# Patient Record
Sex: FEMALE | Race: BLACK OR AFRICAN AMERICAN | NOT HISPANIC OR LATINO | Employment: OTHER | URBAN - METROPOLITAN AREA
[De-identification: names, ages, dates, MRNs, and addresses within clinical notes are randomized per-mention and may not be internally consistent; named-entity substitution may affect disease eponyms.]

---

## 2020-07-20 ENCOUNTER — APPOINTMENT (OUTPATIENT)
Dept: RADIOLOGY | Facility: MEDICAL CENTER | Age: 40
DRG: 021 | End: 2020-07-20
Attending: EMERGENCY MEDICINE

## 2020-07-20 ENCOUNTER — APPOINTMENT (OUTPATIENT)
Dept: RADIOLOGY | Facility: MEDICAL CENTER | Age: 40
DRG: 021 | End: 2020-07-20
Attending: RADIOLOGY

## 2020-07-20 ENCOUNTER — ANESTHESIA EVENT (OUTPATIENT)
Dept: RADIOLOGY | Facility: MEDICAL CENTER | Age: 40
DRG: 021 | End: 2020-07-20

## 2020-07-20 ENCOUNTER — HOSPITAL ENCOUNTER (INPATIENT)
Facility: MEDICAL CENTER | Age: 40
LOS: 14 days | DRG: 021 | End: 2020-08-03
Attending: EMERGENCY MEDICINE | Admitting: INTERNAL MEDICINE

## 2020-07-20 ENCOUNTER — ANESTHESIA (OUTPATIENT)
Dept: RADIOLOGY | Facility: MEDICAL CENTER | Age: 40
DRG: 021 | End: 2020-07-20

## 2020-07-20 DIAGNOSIS — I60.9 SAH (SUBARACHNOID HEMORRHAGE) (HCC): ICD-10-CM

## 2020-07-20 DIAGNOSIS — I60.9 SUBARACHNOID HEMORRHAGE (HCC): ICD-10-CM

## 2020-07-20 PROBLEM — I10 HTN (HYPERTENSION): Status: ACTIVE | Noted: 2020-07-20

## 2020-07-20 PROBLEM — G89.29 PAIN, CHRONIC: Status: ACTIVE | Noted: 2020-07-20

## 2020-07-20 LAB
ALBUMIN SERPL BCP-MCNC: 4.1 G/DL (ref 3.2–4.9)
ALBUMIN/GLOB SERPL: 1.3 G/DL
ALP SERPL-CCNC: 98 U/L (ref 30–99)
ALT SERPL-CCNC: 9 U/L (ref 2–50)
ANION GAP SERPL CALC-SCNC: 13 MMOL/L (ref 7–16)
APTT PPP: 30.5 SEC (ref 24.7–36)
AST SERPL-CCNC: 7 U/L (ref 12–45)
BASOPHILS # BLD AUTO: 0.9 % (ref 0–1.8)
BASOPHILS # BLD: 0.04 K/UL (ref 0–0.12)
BILIRUB SERPL-MCNC: 0.3 MG/DL (ref 0.1–1.5)
BUN SERPL-MCNC: 13 MG/DL (ref 8–22)
CALCIUM SERPL-MCNC: 9.3 MG/DL (ref 8.5–10.5)
CHLORIDE SERPL-SCNC: 102 MMOL/L (ref 96–112)
CO2 SERPL-SCNC: 24 MMOL/L (ref 20–33)
CORTIS SERPL-MCNC: 19 UG/DL (ref 0–23)
CREAT SERPL-MCNC: 1.14 MG/DL (ref 0.5–1.4)
EOSINOPHIL # BLD AUTO: 0.15 K/UL (ref 0–0.51)
EOSINOPHIL NFR BLD: 3.3 % (ref 0–6.9)
ERYTHROCYTE [DISTWIDTH] IN BLOOD BY AUTOMATED COUNT: 48.6 FL (ref 35.9–50)
GLOBULIN SER CALC-MCNC: 3.1 G/DL (ref 1.9–3.5)
GLUCOSE SERPL-MCNC: 88 MG/DL (ref 65–99)
HCT VFR BLD AUTO: 42 % (ref 37–47)
HGB BLD-MCNC: 13.1 G/DL (ref 12–16)
IMM GRANULOCYTES # BLD AUTO: 0.01 K/UL (ref 0–0.11)
IMM GRANULOCYTES NFR BLD AUTO: 0.2 % (ref 0–0.9)
INR PPP: 1.01 (ref 0.87–1.13)
LYMPHOCYTES # BLD AUTO: 2.19 K/UL (ref 1–4.8)
LYMPHOCYTES NFR BLD: 48 % (ref 22–41)
MCH RBC QN AUTO: 27.6 PG (ref 27–33)
MCHC RBC AUTO-ENTMCNC: 31.2 G/DL (ref 33.6–35)
MCV RBC AUTO: 88.6 FL (ref 81.4–97.8)
MONOCYTES # BLD AUTO: 0.32 K/UL (ref 0–0.85)
MONOCYTES NFR BLD AUTO: 7 % (ref 0–13.4)
NEUTROPHILS # BLD AUTO: 1.85 K/UL (ref 2–7.15)
NEUTROPHILS NFR BLD: 40.6 % (ref 44–72)
NRBC # BLD AUTO: 0 K/UL
NRBC BLD-RTO: 0 /100 WBC
PLATELET # BLD AUTO: 164 K/UL (ref 164–446)
PMV BLD AUTO: 12.1 FL (ref 9–12.9)
POTASSIUM SERPL-SCNC: 3.5 MMOL/L (ref 3.6–5.5)
PROT SERPL-MCNC: 7.2 G/DL (ref 6–8.2)
PROTHROMBIN TIME: 13.6 SEC (ref 12–14.6)
RBC # BLD AUTO: 4.74 M/UL (ref 4.2–5.4)
SODIUM SERPL-SCNC: 139 MMOL/L (ref 135–145)
TSH SERPL DL<=0.005 MIU/L-ACNC: <0.005 UIU/ML (ref 0.38–5.33)
WBC # BLD AUTO: 4.6 K/UL (ref 4.8–10.8)

## 2020-07-20 PROCEDURE — 85025 COMPLETE CBC W/AUTO DIFF WBC: CPT

## 2020-07-20 PROCEDURE — 70496 CT ANGIOGRAPHY HEAD: CPT

## 2020-07-20 PROCEDURE — 85730 THROMBOPLASTIN TIME PARTIAL: CPT

## 2020-07-20 PROCEDURE — 700105 HCHG RX REV CODE 258: Performed by: EMERGENCY MEDICINE

## 2020-07-20 PROCEDURE — 03VG3DZ RESTRICTION OF INTRACRANIAL ARTERY WITH INTRALUMINAL DEVICE, PERCUTANEOUS APPROACH: ICD-10-PCS | Performed by: RADIOLOGY

## 2020-07-20 PROCEDURE — 84443 ASSAY THYROID STIM HORMONE: CPT

## 2020-07-20 PROCEDURE — 80053 COMPREHEN METABOLIC PANEL: CPT

## 2020-07-20 PROCEDURE — 70498 CT ANGIOGRAPHY NECK: CPT

## 2020-07-20 PROCEDURE — 700111 HCHG RX REV CODE 636 W/ 250 OVERRIDE (IP): Performed by: ANESTHESIOLOGY

## 2020-07-20 PROCEDURE — 700111 HCHG RX REV CODE 636 W/ 250 OVERRIDE (IP): Performed by: EMERGENCY MEDICINE

## 2020-07-20 PROCEDURE — 85610 PROTHROMBIN TIME: CPT

## 2020-07-20 PROCEDURE — 770022 HCHG ROOM/CARE - ICU (200)

## 2020-07-20 PROCEDURE — B31R1ZZ FLUOROSCOPY OF INTRACRANIAL ARTERIES USING LOW OSMOLAR CONTRAST: ICD-10-PCS | Performed by: RADIOLOGY

## 2020-07-20 PROCEDURE — 82533 TOTAL CORTISOL: CPT

## 2020-07-20 PROCEDURE — 99291 CRITICAL CARE FIRST HOUR: CPT | Performed by: INTERNAL MEDICINE

## 2020-07-20 PROCEDURE — B31F1ZZ FLUOROSCOPY OF LEFT VERTEBRAL ARTERY USING LOW OSMOLAR CONTRAST: ICD-10-PCS | Performed by: RADIOLOGY

## 2020-07-20 PROCEDURE — 700101 HCHG RX REV CODE 250: Performed by: ANESTHESIOLOGY

## 2020-07-20 PROCEDURE — 84439 ASSAY OF FREE THYROXINE: CPT

## 2020-07-20 PROCEDURE — 700101 HCHG RX REV CODE 250: Performed by: EMERGENCY MEDICINE

## 2020-07-20 PROCEDURE — B41F1ZZ FLUOROSCOPY OF RIGHT LOWER EXTREMITY ARTERIES USING LOW OSMOLAR CONTRAST: ICD-10-PCS | Performed by: RADIOLOGY

## 2020-07-20 PROCEDURE — 700105 HCHG RX REV CODE 258: Performed by: ANESTHESIOLOGY

## 2020-07-20 PROCEDURE — C9803 HOPD COVID-19 SPEC COLLECT: HCPCS | Performed by: INTERNAL MEDICINE

## 2020-07-20 PROCEDURE — 700117 HCHG RX CONTRAST REV CODE 255: Performed by: RADIOLOGY

## 2020-07-20 PROCEDURE — 700117 HCHG RX CONTRAST REV CODE 255: Performed by: EMERGENCY MEDICINE

## 2020-07-20 RX ORDER — LABETALOL HYDROCHLORIDE 5 MG/ML
10-20 INJECTION, SOLUTION INTRAVENOUS ONCE
Status: COMPLETED | OUTPATIENT
Start: 2020-07-20 | End: 2020-07-20

## 2020-07-20 RX ORDER — ONDANSETRON 2 MG/ML
4 INJECTION INTRAMUSCULAR; INTRAVENOUS ONCE
Status: COMPLETED | OUTPATIENT
Start: 2020-07-20 | End: 2020-07-20

## 2020-07-20 RX ORDER — SODIUM CHLORIDE, SODIUM LACTATE, POTASSIUM CHLORIDE, CALCIUM CHLORIDE 600; 310; 30; 20 MG/100ML; MG/100ML; MG/100ML; MG/100ML
INJECTION, SOLUTION INTRAVENOUS
Status: DISCONTINUED | OUTPATIENT
Start: 2020-07-20 | End: 2020-07-21 | Stop reason: SURG

## 2020-07-20 RX ORDER — LEVETIRACETAM 5 MG/ML
500 INJECTION INTRAVASCULAR EVERY 12 HOURS
Status: DISCONTINUED | OUTPATIENT
Start: 2020-07-20 | End: 2020-07-21

## 2020-07-20 RX ORDER — CEFAZOLIN SODIUM 1 G/3ML
INJECTION, POWDER, FOR SOLUTION INTRAMUSCULAR; INTRAVENOUS PRN
Status: DISCONTINUED | OUTPATIENT
Start: 2020-07-20 | End: 2020-07-21 | Stop reason: SURG

## 2020-07-20 RX ORDER — MORPHINE SULFATE 10 MG/ML
5 INJECTION, SOLUTION INTRAMUSCULAR; INTRAVENOUS ONCE
Status: COMPLETED | OUTPATIENT
Start: 2020-07-20 | End: 2020-07-20

## 2020-07-20 RX ORDER — HYDROCHLOROTHIAZIDE 25 MG/1
25 TABLET ORAL DAILY
Status: ON HOLD | COMMUNITY
Start: 2020-06-11 | End: 2020-07-31 | Stop reason: SDUPTHER

## 2020-07-20 RX ORDER — METOPROLOL SUCCINATE 25 MG/1
25 TABLET, EXTENDED RELEASE ORAL DAILY
Status: ON HOLD | COMMUNITY
Start: 2020-06-11 | End: 2020-07-31 | Stop reason: SDUPTHER

## 2020-07-20 RX ORDER — NIMODIPINE 30 MG/1
60 CAPSULE, LIQUID FILLED ORAL EVERY 4 HOURS
Status: DISCONTINUED | OUTPATIENT
Start: 2020-07-20 | End: 2020-08-03 | Stop reason: HOSPADM

## 2020-07-20 RX ORDER — BISACODYL 10 MG
10 SUPPOSITORY, RECTAL RECTAL
Status: DISCONTINUED | OUTPATIENT
Start: 2020-07-20 | End: 2020-08-03 | Stop reason: HOSPADM

## 2020-07-20 RX ORDER — PROCHLORPERAZINE EDISYLATE 5 MG/ML
5-10 INJECTION INTRAMUSCULAR; INTRAVENOUS EVERY 4 HOURS PRN
Status: DISCONTINUED | OUTPATIENT
Start: 2020-07-20 | End: 2020-08-03 | Stop reason: HOSPADM

## 2020-07-20 RX ORDER — ONDANSETRON 4 MG/1
4 TABLET, ORALLY DISINTEGRATING ORAL EVERY 4 HOURS PRN
Status: DISCONTINUED | OUTPATIENT
Start: 2020-07-20 | End: 2020-08-03 | Stop reason: HOSPADM

## 2020-07-20 RX ORDER — LIDOCAINE HYDROCHLORIDE 20 MG/ML
INJECTION, SOLUTION EPIDURAL; INFILTRATION; INTRACAUDAL; PERINEURAL PRN
Status: DISCONTINUED | OUTPATIENT
Start: 2020-07-20 | End: 2020-07-21 | Stop reason: SURG

## 2020-07-20 RX ORDER — LABETALOL HYDROCHLORIDE 5 MG/ML
10 INJECTION, SOLUTION INTRAVENOUS EVERY 4 HOURS PRN
Status: DISCONTINUED | OUTPATIENT
Start: 2020-07-20 | End: 2020-08-03 | Stop reason: HOSPADM

## 2020-07-20 RX ORDER — HYDROMORPHONE HYDROCHLORIDE 1 MG/ML
1 INJECTION, SOLUTION INTRAMUSCULAR; INTRAVENOUS; SUBCUTANEOUS ONCE
Status: COMPLETED | OUTPATIENT
Start: 2020-07-20 | End: 2020-07-20

## 2020-07-20 RX ORDER — PROMETHAZINE HYDROCHLORIDE 25 MG/1
12.5-25 SUPPOSITORY RECTAL EVERY 4 HOURS PRN
Status: DISCONTINUED | OUTPATIENT
Start: 2020-07-20 | End: 2020-08-03 | Stop reason: HOSPADM

## 2020-07-20 RX ORDER — MORPHINE SULFATE 4 MG/ML
4 INJECTION, SOLUTION INTRAMUSCULAR; INTRAVENOUS
Status: DISCONTINUED | OUTPATIENT
Start: 2020-07-20 | End: 2020-07-26

## 2020-07-20 RX ORDER — AMOXICILLIN 250 MG
2 CAPSULE ORAL 2 TIMES DAILY
Status: DISCONTINUED | OUTPATIENT
Start: 2020-07-20 | End: 2020-08-03 | Stop reason: HOSPADM

## 2020-07-20 RX ORDER — PHENYLEPHRINE HYDROCHLORIDE 10 MG/ML
INJECTION, SOLUTION INTRAMUSCULAR; INTRAVENOUS; SUBCUTANEOUS PRN
Status: DISCONTINUED | OUTPATIENT
Start: 2020-07-20 | End: 2020-07-21 | Stop reason: SURG

## 2020-07-20 RX ORDER — ACETAMINOPHEN 325 MG/1
650 TABLET ORAL EVERY 6 HOURS PRN
Status: DISCONTINUED | OUTPATIENT
Start: 2020-07-20 | End: 2020-08-03 | Stop reason: HOSPADM

## 2020-07-20 RX ORDER — POLYETHYLENE GLYCOL 3350 17 G/17G
1 POWDER, FOR SOLUTION ORAL
Status: DISCONTINUED | OUTPATIENT
Start: 2020-07-20 | End: 2020-08-03 | Stop reason: HOSPADM

## 2020-07-20 RX ORDER — OXYCODONE HYDROCHLORIDE 5 MG/1
5 TABLET ORAL EVERY 4 HOURS PRN
Status: DISCONTINUED | OUTPATIENT
Start: 2020-07-20 | End: 2020-07-21

## 2020-07-20 RX ORDER — PROMETHAZINE HYDROCHLORIDE 25 MG/1
12.5-25 TABLET ORAL EVERY 4 HOURS PRN
Status: DISCONTINUED | OUTPATIENT
Start: 2020-07-20 | End: 2020-08-03 | Stop reason: HOSPADM

## 2020-07-20 RX ORDER — ONDANSETRON 2 MG/ML
4 INJECTION INTRAMUSCULAR; INTRAVENOUS EVERY 4 HOURS PRN
Status: DISCONTINUED | OUTPATIENT
Start: 2020-07-20 | End: 2020-08-03 | Stop reason: HOSPADM

## 2020-07-20 RX ORDER — LISINOPRIL 40 MG/1
40 TABLET ORAL DAILY
Status: ON HOLD | COMMUNITY
End: 2020-07-31 | Stop reason: SDUPTHER

## 2020-07-20 RX ADMIN — PROPOFOL 200 MG: 10 INJECTION, EMULSION INTRAVENOUS at 23:18

## 2020-07-20 RX ADMIN — SODIUM CHLORIDE 15 MG/HR: 9 INJECTION, SOLUTION INTRAVENOUS at 22:22

## 2020-07-20 RX ADMIN — ONDANSETRON 4 MG: 2 INJECTION INTRAMUSCULAR; INTRAVENOUS at 20:30

## 2020-07-20 RX ADMIN — IOHEXOL 80 ML: 350 INJECTION, SOLUTION INTRAVENOUS at 21:44

## 2020-07-20 RX ADMIN — CEFAZOLIN 2 G: 330 INJECTION, POWDER, FOR SOLUTION INTRAMUSCULAR; INTRAVENOUS at 23:34

## 2020-07-20 RX ADMIN — SODIUM CHLORIDE 10 MG/HR: 9 INJECTION, SOLUTION INTRAVENOUS at 23:07

## 2020-07-20 RX ADMIN — PHENYLEPHRINE HYDROCHLORIDE 100 MCG: 10 INJECTION INTRAVENOUS at 23:47

## 2020-07-20 RX ADMIN — ROCURONIUM BROMIDE 20 MG: 10 INJECTION, SOLUTION INTRAVENOUS at 23:50

## 2020-07-20 RX ADMIN — ROCURONIUM BROMIDE 80 MG: 10 INJECTION, SOLUTION INTRAVENOUS at 23:18

## 2020-07-20 RX ADMIN — LABETALOL HYDROCHLORIDE 20 MG: 5 INJECTION, SOLUTION INTRAVENOUS at 21:57

## 2020-07-20 RX ADMIN — PHENYLEPHRINE HYDROCHLORIDE 100 MCG: 10 INJECTION INTRAVENOUS at 23:53

## 2020-07-20 RX ADMIN — HYDROMORPHONE HYDROCHLORIDE 1 MG: 1 INJECTION, SOLUTION INTRAMUSCULAR; INTRAVENOUS; SUBCUTANEOUS at 21:58

## 2020-07-20 RX ADMIN — SODIUM CHLORIDE, POTASSIUM CHLORIDE, SODIUM LACTATE AND CALCIUM CHLORIDE: 600; 310; 30; 20 INJECTION, SOLUTION INTRAVENOUS at 23:07

## 2020-07-20 RX ADMIN — MORPHINE SULFATE 5 MG: 10 INJECTION INTRAVENOUS at 20:26

## 2020-07-20 RX ADMIN — LIDOCAINE HYDROCHLORIDE 100 MG: 20 INJECTION, SOLUTION EPIDURAL; INFILTRATION; INTRACAUDAL at 23:18

## 2020-07-20 RX ADMIN — FENTANYL CITRATE 100 MCG: 50 INJECTION INTRAMUSCULAR; INTRAVENOUS at 23:15

## 2020-07-20 RX ADMIN — PHENYLEPHRINE HYDROCHLORIDE 100 MCG: 10 INJECTION INTRAVENOUS at 23:40

## 2020-07-20 RX ADMIN — IOHEXOL 85 ML: 300 INJECTION, SOLUTION INTRAVENOUS at 23:45

## 2020-07-20 ASSESSMENT — ENCOUNTER SYMPTOMS
ABDOMINAL PAIN: 0
NECK PAIN: 1
DEPRESSION: 0
NAUSEA: 0
DIZZINESS: 1
SPUTUM PRODUCTION: 0
CHILLS: 0
VOMITING: 0
HEADACHES: 1
PALPITATIONS: 0
BLURRED VISION: 1
FEVER: 0
SHORTNESS OF BREATH: 0
FOCAL WEAKNESS: 0
WEAKNESS: 1
DOUBLE VISION: 0

## 2020-07-21 ENCOUNTER — APPOINTMENT (OUTPATIENT)
Dept: RADIOLOGY | Facility: MEDICAL CENTER | Age: 40
DRG: 021 | End: 2020-07-21
Attending: INTERNAL MEDICINE

## 2020-07-21 LAB
AMPHET UR QL SCN: NEGATIVE
BARBITURATES UR QL SCN: NEGATIVE
BENZODIAZ UR QL SCN: NEGATIVE
BZE UR QL SCN: POSITIVE
CANNABINOIDS UR QL SCN: POSITIVE
CHOLEST SERPL-MCNC: 137 MG/DL (ref 100–199)
HDLC SERPL-MCNC: 45 MG/DL
LDLC SERPL CALC-MCNC: 80 MG/DL
METHADONE UR QL SCN: NEGATIVE
OPIATES UR QL SCN: POSITIVE
OXYCODONE UR QL SCN: NEGATIVE
PCP UR QL SCN: NEGATIVE
PROPOXYPH UR QL SCN: NEGATIVE
T4 FREE SERPL-MCNC: 1.57 NG/DL (ref 0.93–1.7)
TRIGL SERPL-MCNC: 61 MG/DL (ref 0–149)

## 2020-07-21 PROCEDURE — 700111 HCHG RX REV CODE 636 W/ 250 OVERRIDE (IP): Performed by: ANESTHESIOLOGY

## 2020-07-21 PROCEDURE — 99291 CRITICAL CARE FIRST HOUR: CPT

## 2020-07-21 PROCEDURE — 61624 TCAT PERM OCCLS/EMBOLJ CNS: CPT

## 2020-07-21 PROCEDURE — A9270 NON-COVERED ITEM OR SERVICE: HCPCS | Performed by: PSYCHIATRY & NEUROLOGY

## 2020-07-21 PROCEDURE — A9270 NON-COVERED ITEM OR SERVICE: HCPCS | Performed by: INTERNAL MEDICINE

## 2020-07-21 PROCEDURE — 93886 INTRACRANIAL COMPLETE STUDY: CPT | Mod: 26 | Performed by: INTERNAL MEDICINE

## 2020-07-21 PROCEDURE — 700101 HCHG RX REV CODE 250: Performed by: INTERNAL MEDICINE

## 2020-07-21 PROCEDURE — 700102 HCHG RX REV CODE 250 W/ 637 OVERRIDE(OP): Performed by: PSYCHIATRY & NEUROLOGY

## 2020-07-21 PROCEDURE — 700111 HCHG RX REV CODE 636 W/ 250 OVERRIDE (IP): Performed by: INTERNAL MEDICINE

## 2020-07-21 PROCEDURE — 700101 HCHG RX REV CODE 250: Performed by: PSYCHIATRY & NEUROLOGY

## 2020-07-21 PROCEDURE — 99233 SBSQ HOSP IP/OBS HIGH 50: CPT | Performed by: PSYCHIATRY & NEUROLOGY

## 2020-07-21 PROCEDURE — 700101 HCHG RX REV CODE 250: Performed by: ANESTHESIOLOGY

## 2020-07-21 PROCEDURE — 700102 HCHG RX REV CODE 250 W/ 637 OVERRIDE(OP): Performed by: INTERNAL MEDICINE

## 2020-07-21 PROCEDURE — 80307 DRUG TEST PRSMV CHEM ANLYZR: CPT

## 2020-07-21 PROCEDURE — 700111 HCHG RX REV CODE 636 W/ 250 OVERRIDE (IP): Performed by: PSYCHIATRY & NEUROLOGY

## 2020-07-21 PROCEDURE — 96375 TX/PRO/DX INJ NEW DRUG ADDON: CPT

## 2020-07-21 PROCEDURE — 96374 THER/PROPH/DIAG INJ IV PUSH: CPT

## 2020-07-21 PROCEDURE — 770022 HCHG ROOM/CARE - ICU (200)

## 2020-07-21 PROCEDURE — 80061 LIPID PANEL: CPT

## 2020-07-21 PROCEDURE — 93886 INTRACRANIAL COMPLETE STUDY: CPT

## 2020-07-21 PROCEDURE — 160002 HCHG RECOVERY MINUTES (STAT)

## 2020-07-21 RX ORDER — GABAPENTIN 300 MG/1
300 CAPSULE ORAL 3 TIMES DAILY
Status: DISCONTINUED | OUTPATIENT
Start: 2020-07-21 | End: 2020-08-03 | Stop reason: HOSPADM

## 2020-07-21 RX ORDER — LISINOPRIL 20 MG/1
40 TABLET ORAL DAILY
Status: DISCONTINUED | OUTPATIENT
Start: 2020-07-21 | End: 2020-07-25

## 2020-07-21 RX ORDER — DIPHENHYDRAMINE HYDROCHLORIDE 50 MG/ML
12.5 INJECTION INTRAMUSCULAR; INTRAVENOUS
Status: DISCONTINUED | OUTPATIENT
Start: 2020-07-21 | End: 2020-07-21 | Stop reason: HOSPADM

## 2020-07-21 RX ORDER — LEVETIRACETAM 500 MG/1
500 TABLET ORAL 2 TIMES DAILY
Status: COMPLETED | OUTPATIENT
Start: 2020-07-21 | End: 2020-07-27

## 2020-07-21 RX ORDER — ALBUTEROL SULFATE 90 UG/1
2 AEROSOL, METERED RESPIRATORY (INHALATION)
Status: DISCONTINUED | OUTPATIENT
Start: 2020-07-21 | End: 2020-08-03 | Stop reason: HOSPADM

## 2020-07-21 RX ORDER — ONDANSETRON 2 MG/ML
INJECTION INTRAMUSCULAR; INTRAVENOUS PRN
Status: DISCONTINUED | OUTPATIENT
Start: 2020-07-21 | End: 2020-07-21 | Stop reason: SURG

## 2020-07-21 RX ORDER — OXYCODONE HCL 5 MG/5 ML
10 SOLUTION, ORAL ORAL
Status: DISCONTINUED | OUTPATIENT
Start: 2020-07-21 | End: 2020-07-21 | Stop reason: HOSPADM

## 2020-07-21 RX ORDER — ONDANSETRON 2 MG/ML
4 INJECTION INTRAMUSCULAR; INTRAVENOUS
Status: DISCONTINUED | OUTPATIENT
Start: 2020-07-21 | End: 2020-07-21 | Stop reason: HOSPADM

## 2020-07-21 RX ORDER — DEXAMETHASONE SODIUM PHOSPHATE 4 MG/ML
INJECTION, SOLUTION INTRA-ARTICULAR; INTRALESIONAL; INTRAMUSCULAR; INTRAVENOUS; SOFT TISSUE PRN
Status: DISCONTINUED | OUTPATIENT
Start: 2020-07-21 | End: 2020-07-21 | Stop reason: SURG

## 2020-07-21 RX ORDER — GABAPENTIN 100 MG/1
100 CAPSULE ORAL 3 TIMES DAILY
Status: DISCONTINUED | OUTPATIENT
Start: 2020-07-21 | End: 2020-07-21

## 2020-07-21 RX ORDER — IPRATROPIUM BROMIDE AND ALBUTEROL SULFATE 2.5; .5 MG/3ML; MG/3ML
3 SOLUTION RESPIRATORY (INHALATION)
Status: DISCONTINUED | OUTPATIENT
Start: 2020-07-21 | End: 2020-07-21 | Stop reason: ALTCHOICE

## 2020-07-21 RX ORDER — MAGNESIUM SULFATE HEPTAHYDRATE 40 MG/ML
2 INJECTION, SOLUTION INTRAVENOUS ONCE
Status: COMPLETED | OUTPATIENT
Start: 2020-07-21 | End: 2020-07-21

## 2020-07-21 RX ORDER — HALOPERIDOL 5 MG/ML
1 INJECTION INTRAMUSCULAR
Status: DISCONTINUED | OUTPATIENT
Start: 2020-07-21 | End: 2020-07-21 | Stop reason: HOSPADM

## 2020-07-21 RX ORDER — LIDOCAINE 50 MG/G
1 PATCH TOPICAL EVERY 24 HOURS
Status: DISCONTINUED | OUTPATIENT
Start: 2020-07-21 | End: 2020-08-03 | Stop reason: HOSPADM

## 2020-07-21 RX ORDER — HEPARIN SODIUM,PORCINE 1000/ML
VIAL (ML) INJECTION PRN
Status: DISCONTINUED | OUTPATIENT
Start: 2020-07-21 | End: 2020-07-21 | Stop reason: SURG

## 2020-07-21 RX ORDER — HEPARIN SODIUM,PORCINE 1000/ML
VIAL (ML) INJECTION
Status: COMPLETED
Start: 2020-07-21 | End: 2020-07-21

## 2020-07-21 RX ORDER — HYDRALAZINE HYDROCHLORIDE 20 MG/ML
5 INJECTION INTRAMUSCULAR; INTRAVENOUS
Status: DISCONTINUED | OUTPATIENT
Start: 2020-07-21 | End: 2020-07-21 | Stop reason: HOSPADM

## 2020-07-21 RX ORDER — SODIUM CHLORIDE, SODIUM LACTATE, POTASSIUM CHLORIDE, CALCIUM CHLORIDE 600; 310; 30; 20 MG/100ML; MG/100ML; MG/100ML; MG/100ML
INJECTION, SOLUTION INTRAVENOUS CONTINUOUS
Status: DISCONTINUED | OUTPATIENT
Start: 2020-07-21 | End: 2020-07-21 | Stop reason: HOSPADM

## 2020-07-21 RX ORDER — AMLODIPINE BESYLATE 5 MG/1
10 TABLET ORAL DAILY
Status: DISCONTINUED | OUTPATIENT
Start: 2020-07-21 | End: 2020-08-03 | Stop reason: HOSPADM

## 2020-07-21 RX ORDER — LABETALOL HYDROCHLORIDE 5 MG/ML
5 INJECTION, SOLUTION INTRAVENOUS
Status: DISCONTINUED | OUTPATIENT
Start: 2020-07-21 | End: 2020-07-21 | Stop reason: HOSPADM

## 2020-07-21 RX ORDER — MEPERIDINE HYDROCHLORIDE 25 MG/ML
6.25 INJECTION INTRAMUSCULAR; INTRAVENOUS; SUBCUTANEOUS
Status: DISCONTINUED | OUTPATIENT
Start: 2020-07-21 | End: 2020-07-21 | Stop reason: HOSPADM

## 2020-07-21 RX ORDER — OXYCODONE HYDROCHLORIDE 10 MG/1
10 TABLET ORAL EVERY 4 HOURS PRN
Status: DISCONTINUED | OUTPATIENT
Start: 2020-07-21 | End: 2020-07-26

## 2020-07-21 RX ORDER — OXYCODONE HCL 5 MG/5 ML
5 SOLUTION, ORAL ORAL
Status: DISCONTINUED | OUTPATIENT
Start: 2020-07-21 | End: 2020-07-21 | Stop reason: HOSPADM

## 2020-07-21 RX ADMIN — LEVETIRACETAM 500 MG: 500 TABLET ORAL at 18:04

## 2020-07-21 RX ADMIN — DOCUSATE SODIUM 50 MG AND SENNOSIDES 8.6 MG 2 TABLET: 8.6; 5 TABLET, FILM COATED ORAL at 05:17

## 2020-07-21 RX ADMIN — LISINOPRIL 40 MG: 20 TABLET ORAL at 13:15

## 2020-07-21 RX ADMIN — SUGAMMADEX 200 MG: 100 INJECTION, SOLUTION INTRAVENOUS at 00:38

## 2020-07-21 RX ADMIN — OXYCODONE HYDROCHLORIDE 10 MG: 10 TABLET ORAL at 19:52

## 2020-07-21 RX ADMIN — LEVETIRACETAM INJECTION 500 MG: 5 INJECTION INTRAVENOUS at 05:24

## 2020-07-21 RX ADMIN — FENTANYL CITRATE 50 MCG: 50 INJECTION INTRAMUSCULAR; INTRAVENOUS at 01:19

## 2020-07-21 RX ADMIN — MORPHINE SULFATE 4 MG: 4 INJECTION INTRAVENOUS at 04:40

## 2020-07-21 RX ADMIN — DEXAMETHASONE SODIUM PHOSPHATE 8 MG: 4 INJECTION, SOLUTION INTRA-ARTICULAR; INTRALESIONAL; INTRAMUSCULAR; INTRAVENOUS; SOFT TISSUE at 00:12

## 2020-07-21 RX ADMIN — HEPARIN SODIUM 5000 UNITS: 1000 INJECTION, SOLUTION INTRAVENOUS; SUBCUTANEOUS at 00:12

## 2020-07-21 RX ADMIN — GABAPENTIN 300 MG: 300 CAPSULE ORAL at 18:04

## 2020-07-21 RX ADMIN — MORPHINE SULFATE 4 MG: 4 INJECTION INTRAVENOUS at 13:15

## 2020-07-21 RX ADMIN — AMLODIPINE BESYLATE 10 MG: 10 TABLET ORAL at 13:15

## 2020-07-21 RX ADMIN — NIMODIPINE 60 MG: 30 CAPSULE ORAL at 01:56

## 2020-07-21 RX ADMIN — LIDOCAINE 1 PATCH: 50 PATCH TOPICAL at 15:24

## 2020-07-21 RX ADMIN — SUGAMMADEX 200 MG: 100 INJECTION, SOLUTION INTRAVENOUS at 00:36

## 2020-07-21 RX ADMIN — NIMODIPINE 60 MG: 30 CAPSULE ORAL at 21:50

## 2020-07-21 RX ADMIN — MORPHINE SULFATE 4 MG: 4 INJECTION INTRAVENOUS at 20:53

## 2020-07-21 RX ADMIN — ONDANSETRON 4 MG: 2 INJECTION INTRAMUSCULAR; INTRAVENOUS at 00:34

## 2020-07-21 RX ADMIN — MORPHINE SULFATE 4 MG: 4 INJECTION INTRAVENOUS at 18:07

## 2020-07-21 RX ADMIN — DOCUSATE SODIUM 50 MG AND SENNOSIDES 8.6 MG 2 TABLET: 8.6; 5 TABLET, FILM COATED ORAL at 18:04

## 2020-07-21 RX ADMIN — MORPHINE SULFATE 4 MG: 4 INJECTION INTRAVENOUS at 22:55

## 2020-07-21 RX ADMIN — NIMODIPINE 60 MG: 30 CAPSULE ORAL at 18:04

## 2020-07-21 RX ADMIN — MAGNESIUM SULFATE 2 G: 2 INJECTION INTRAVENOUS at 14:15

## 2020-07-21 RX ADMIN — OXYCODONE HYDROCHLORIDE 10 MG: 10 TABLET ORAL at 15:23

## 2020-07-21 RX ADMIN — NIMODIPINE 60 MG: 30 CAPSULE ORAL at 09:55

## 2020-07-21 RX ADMIN — NIMODIPINE 60 MG: 30 CAPSULE ORAL at 05:17

## 2020-07-21 RX ADMIN — LABETALOL HYDROCHLORIDE 10 MG: 5 INJECTION INTRAVENOUS at 02:04

## 2020-07-21 RX ADMIN — NIMODIPINE 60 MG: 30 CAPSULE ORAL at 14:00

## 2020-07-21 RX ADMIN — OXYCODONE 5 MG: 5 TABLET ORAL at 09:55

## 2020-07-21 RX ADMIN — MORPHINE SULFATE 4 MG: 4 INJECTION INTRAVENOUS at 08:00

## 2020-07-21 RX ADMIN — FENTANYL CITRATE 50 MCG: 50 INJECTION INTRAMUSCULAR; INTRAVENOUS at 01:10

## 2020-07-21 RX ADMIN — MORPHINE SULFATE 4 MG: 4 INJECTION INTRAVENOUS at 01:55

## 2020-07-21 ASSESSMENT — ENCOUNTER SYMPTOMS
MUSCULOSKELETAL NEGATIVE: 1
FEVER: 0
HEADACHES: 1
PSYCHIATRIC NEGATIVE: 1
EYES NEGATIVE: 1
CHILLS: 0
VOMITING: 0
FOCAL WEAKNESS: 0
ABDOMINAL PAIN: 0
SHORTNESS OF BREATH: 0
NAUSEA: 0

## 2020-07-21 ASSESSMENT — COPD QUESTIONNAIRES
DURING THE PAST 4 WEEKS HOW MUCH DID YOU FEEL SHORT OF BREATH: SOME OF THE TIME
COPD SCREENING SCORE: 1
HAVE YOU SMOKED AT LEAST 100 CIGARETTES IN YOUR ENTIRE LIFE: NO/DON'T KNOW
DO YOU EVER COUGH UP ANY MUCUS OR PHLEGM?: NO/ONLY WITH OCCASIONAL COLDS OR INFECTIONS

## 2020-07-21 ASSESSMENT — LIFESTYLE VARIABLES: EVER_SMOKED: YES

## 2020-07-21 NOTE — PROGRESS NOTES
IR Nursing Note:    Patient consented for Cerebral Angiogram with Possible Intervention with imaging guidance by Dr. Reeves and anesthesiologist Dr. Rico, all questions answered. Right Femoral Artery accessed. The pt appeared to tolerate the procedure well.   Gauze and tegaderm applied to right groin, CDI and soft.  Pt alert post procedure, vital signs stable during procedure and transport, see flow sheet for vital signs.  Report given to JOE Rosas.  RN and Dr. Rico transported pt to [facility] with ICU monitor.      Microvention Hydro Soft 10 Endovascular Embolization Coil 3D [id number] placed in the Right PCOM. Ref [id number]. Lot [id number]    Microvention Bland Soft 10 Endovascular Embolization Coil 3D [id number] placed in the Right PCOM. Ref [id number]. Lot [id number]    Angioseal deployed in the right femoral artery at 0035.

## 2020-07-21 NOTE — PROGRESS NOTES
2 RN skin check complete with JOE Bauer  Devices in place SPO2 probe, BP cuff, temp braden, SCDs, and nasal cannula.  Skin assessed under devices SPO2 probe, BP cuff, temp braden, SCDs, and nasal cannula.  The following interventions in place Patient reposition SPO2 probe, BP cuff, SCDs every 2 hours. Foam on silicone nasal cannula.

## 2020-07-21 NOTE — PROGRESS NOTES
Aneurysmal Subarachnoid hemorrhage from Right PCOM aneurysm.  Going to angio suit for intervention.  No hydrocephalus on CT head. Will observe for possible need of EVD.

## 2020-07-21 NOTE — ED TRIAGE NOTES
Pt brought in by EMS for head pain and blurred vision. Pt tearful during triage. Per EMS pt has hx of stroke. No residual effects noted. Pt given 100 mcg of fentanyl enroute with no pain relief, per EMS.

## 2020-07-21 NOTE — CONSULTS
Critical Care Consultation    Date of consult: 2020    Referring Physician  Daniel Leiva M.D.    Reason for Consultation  Subarachnoid hemorrhage    History of Presenting Illness  40 y.o. female with chronic HTN, chronic pain, anxiety, history of CVA without residual deficit who presented 2020 with acute onset headache, generalized weakness, visual disturbance.  Work-up indicated 3.1 mm right PCOM  aneurysm as well as right sylvian fissure, frontal, and bilateral basal cistern subarachnoid hemorrhage.  IR was consulted and plan for cerebral angiogram and attempted intervention.    Patient was reportedly in her normal state of health, [place] to [place] bathroom when this came on all of a sudden initially with severe headache, blurry vision, and generalized weakness.  Subsequently patient came to ER.  Admits to few cigarettes per day, denies any alcohol or drugs.    Code Status  No Order    Review of Systems  Review of Systems   Constitutional: Positive for malaise/fatigue. Negative for chills and fever.   HENT: Negative for congestion.    Eyes: Positive for blurred vision. Negative for double vision.   Respiratory: Negative for sputum production and shortness of breath.    Cardiovascular: Negative for chest pain and palpitations.   Gastrointestinal: Negative for abdominal pain, nausea and vomiting.   Genitourinary: Negative for dysuria.   Musculoskeletal: Positive for neck pain.   Neurological: Positive for dizziness, weakness and headaches. Negative for focal weakness.   Psychiatric/Behavioral: Negative for depression.   All other systems reviewed and are negative.      Past Medical History   has a past medical history of Asthma, Depression, cholecystectomy, Kidney disease, and PTSD (post-traumatic stress disorder).    Surgical History   has a past surgical history that includes cholecystectomy; pr  delivery only; and tubal ligation.    Family History  family history is not on  file.    Social History   reports that she has been smoking. She has been smoking about 0.10 packs per day. She does not have any smokeless tobacco history on file. She reports current drug use. Drug: Inhaled. She reports that she does not drink alcohol.    Medications  Home Medications    **Home medications have not yet been reviewed for this encounter**       Current Facility-Administered Medications   Medication Dose Route Frequency Provider Last Rate Last Dose   • niCARdipine (CARDENE) 25 mg in  mL Infusion  0-15 mg/hr Intravenous Continuous Daniel Leiva M.D. 150 mL/hr at 07/20/20 2222 15 mg/hr at 07/20/20 2222     Current Outpatient Medications   Medication Sig Dispense Refill   • metoprolol SR (TOPROL XL) 25 MG TABLET SR 24 HR Take 25 mg by mouth every day.     • hydroCHLOROthiazide (HYDRODIURIL) 25 MG Tab Take 25 mg by mouth every day.     • lisinopril (PRINIVIL) 40 MG tablet Take 40 mg by mouth every day.     • lorazepam (ATIVAN) 1 MG Tab Take 0.5 mg by mouth every four hours as needed for Anxiety.     • carisoprodol (SOMA) 350 MG Tab Take 350 mg by mouth every 8 hours as needed for Muscle Spasms.     • hydrochlorothiazide (HYDRODIURIL) 25 MG Tab Take 1 Tab by mouth every day. 30 Tab 0   • lorazepam (ATIVAN) 0.5 MG Tab Take 1 Tab by mouth 1 time daily as needed for Anxiety. 30 Tab 0   • carisoprodol (SOMA) 350 MG Tab Take 1 Tab by mouth every 8 hours as needed for Muscle Spasms. 30 Tab 0   • citalopram (CELEXA) 20 MG Tab Take 1 Tab by mouth every day. 30 Tab 0   • hydrocodone-acetaminophen (NORCO) 5-325 MG Tab per tablet Take 1-2 Tabs by mouth every four hours as needed. 20 Tab 0   • hydrochlorothiazide (HYDRODIURIL) 50 MG Tab Take 50 mg by mouth every day.     • amlodipine (NORVASC) 10 MG Tab Take 10 mg by mouth every day.     • citalopram (CELEXA) 40 MG Tab Take 40 mg by mouth every day.     • temazepam (RESTORIL) 15 MG Cap Take 15 mg by mouth at bedtime as needed for Sleep.     • albuterol 108  (90 BASE) MCG/ACT Aero Soln inhalation aerosol Inhale 2 Puffs by mouth every 6 hours as needed for Shortness of Breath.     • ondansetron (ZOFRAN ODT) 4 MG TABLET DISPERSIBLE Take 1 Tab by mouth every 8 hours as needed. 10 Tab 1   • hydrocodone-acetaminophen (NORCO) 7.5-325 MG per tablet Take 1-2 Tabs by mouth every 8 hours as needed. 15 Tab 0       Allergies  Allergies   Allergen Reactions   • Motrin [Ibuprofen]    • Pcn [Penicillins]        Vital Signs last 24 hours  Temp:  [37.1 °C (98.8 °F)] 37.1 °C (98.8 °F)  Pulse:  [81-98] 85  Resp:  [9-20] 19  BP: (187-221)/() 198/106  SpO2:  [89 %-100 %] 100 %    Physical Exam  Physical Exam  Vitals signs and nursing note reviewed.   Constitutional:       General: She is in acute distress.      Appearance: She is ill-appearing. She is not diaphoretic.      Comments: Appears much older than stated age   HENT:      Head: Normocephalic and atraumatic.   Eyes:      Pupils: Pupils are equal, round, and reactive to light.   Cardiovascular:      Rate and Rhythm: Normal rate.      Pulses: Normal pulses.   Pulmonary:      Breath sounds: No wheezing or rales.   Abdominal:      General: There is no distension.      Palpations: Abdomen is soft.      Tenderness: There is no abdominal tenderness.   Musculoskeletal:         General: No swelling.   Skin:     General: Skin is warm and dry.   Neurological:      Mental Status: She is alert and oriented to person, place, and time.      Cranial Nerves: No cranial nerve deficit.   Psychiatric:      Comments: Quite emotional         Fluids  No intake or output data in the 24 hours ending 07/20/20 2222    Laboratory  Recent Results (from the past 48 hour(s))   CBC WITH DIFFERENTIAL    Collection Time: 07/20/20  7:44 PM   Result Value Ref Range    WBC 4.6 (L) 4.8 - 10.8 K/uL    RBC 4.74 4.20 - 5.40 M/uL    Hemoglobin 13.1 12.0 - 16.0 g/dL    Hematocrit 42.0 37.0 - 47.0 %    MCV 88.6 81.4 - 97.8 fL    MCH 27.6 27.0 - 33.0 pg    MCHC 31.2 (L)  33.6 - 35.0 g/dL    RDW 48.6 35.9 - 50.0 fL    Platelet Count 164 164 - 446 K/uL    MPV 12.1 9.0 - 12.9 fL    Neutrophils-Polys 40.60 (L) 44.00 - 72.00 %    Lymphocytes 48.00 (H) 22.00 - 41.00 %    Monocytes 7.00 0.00 - 13.40 %    Eosinophils 3.30 0.00 - 6.90 %    Basophils 0.90 0.00 - 1.80 %    Immature Granulocytes 0.20 0.00 - 0.90 %    Nucleated RBC 0.00 /100 WBC    Neutrophils (Absolute) 1.85 (L) 2.00 - 7.15 K/uL    Lymphs (Absolute) 2.19 1.00 - 4.80 K/uL    Monos (Absolute) 0.32 0.00 - 0.85 K/uL    Eos (Absolute) 0.15 0.00 - 0.51 K/uL    Baso (Absolute) 0.04 0.00 - 0.12 K/uL    Immature Granulocytes (abs) 0.01 0.00 - 0.11 K/uL    NRBC (Absolute) 0.00 K/uL   COMP METABOLIC PANEL    Collection Time: 07/20/20  7:44 PM   Result Value Ref Range    Sodium 139 135 - 145 mmol/L    Potassium 3.5 (L) 3.6 - 5.5 mmol/L    Chloride 102 96 - 112 mmol/L    Co2 24 20 - 33 mmol/L    Anion Gap 13.0 7.0 - 16.0    Glucose 88 65 - 99 mg/dL    Bun 13 8 - 22 mg/dL    Creatinine 1.14 0.50 - 1.40 mg/dL    Calcium 9.3 8.5 - 10.5 mg/dL    AST(SGOT) 7 (L) 12 - 45 U/L    ALT(SGPT) 9 2 - 50 U/L    Alkaline Phosphatase 98 30 - 99 U/L    Total Bilirubin 0.3 0.1 - 1.5 mg/dL    Albumin 4.1 3.2 - 4.9 g/dL    Total Protein 7.2 6.0 - 8.2 g/dL    Globulin 3.1 1.9 - 3.5 g/dL    A-G Ratio 1.3 g/dL   PROTHROMBIN TIME    Collection Time: 07/20/20  7:44 PM   Result Value Ref Range    PT 13.6 12.0 - 14.6 sec    INR 1.01 0.87 - 1.13   APTT    Collection Time: 07/20/20  7:44 PM   Result Value Ref Range    APTT 30.5 24.7 - 36.0 sec   ESTIMATED GFR    Collection Time: 07/20/20  7:44 PM   Result Value Ref Range    GFR If African American >60 >60 mL/min/1.73 m 2    GFR If Non African American 52 (A) >60 mL/min/1.73 m 2       Imaging  CT-CTA HEAD WITH & W/O-POST PROCESS   Final Result         1.  3.1 mm right aneurysm, likely from the supraclinoid segment.   2.  Right sylvian fissure, frontal, and bilateral basal cistern, appearance compatible subarachnoid  hemorrhage.      These findings were discussed with the patient's clinician, Daniel Leiva, on 7/20/2020 9:51 PM.      CT-CTA NECK WITH & W/O-POST PROCESSING   Final Result         1.  CT angiogram of the neck within normal limits.             Assessment/Plan  * SAH (subarachnoid hemorrhage) (HCC)  Assessment & Plan  Noted aneurysm and PCO M  Going for IR cerebral angiogram tonight and possible intervention  Maintain SBP less than 150  PT/INR WNL, check TEG correct any coagulopathy  Antiplatelet or anticoagulant therapies  Every hour neuro checks, stat head CT with any neurologic deterioration  May need EVD if develops obstructive hydrocephalus  Check UDS, echo, TSH, cortisol  Start Nimotop for potential vasospasm  keppra for seizure prophylaxis      Pain, chronic  Assessment & Plan  Resume home regimen    HTN (hypertension)  Assessment & Plan  Question compliance with home regimen  Cardene infusion to maintain SBP less than 150  Restart scheduled orals as clinically tolerated      Discussed patient condition and risk of morbidity and/or mortality with Family, RN, Patient and erp.    The patient remains critically ill.  Critical care time = 35 minutes in directly providing and coordinating critical care and extensive data review.  No time overlap and excludes procedures.

## 2020-07-21 NOTE — PROGRESS NOTES
Patient arrived to RICU at 0140 on ICU bed with JOE Rosas. Patient is disoriented to time. Right femoral site soft and dressing clean, dry and intact. Pedal pulses 1+. Patient complaining of a headache at this time and medicated per MAR. Educated provided on POC. Patient verbalizes understanding.

## 2020-07-21 NOTE — ANESTHESIA PROCEDURE NOTES
Arterial Line  Performed by: Gonzalez Rico M.D.  Authorized by: Gonzalez Rico M.D.     Start Time:  7/20/2020 11:27 PM  End Time:  7/20/2020 11:29 PM  Localization: ultrasound guidance and surface landmarks    Patient Location:  OR  Indication: continuous blood pressure monitoring        Catheter Size:  20 G  Seldinger Technique?: Yes    Laterality:  Left  Site:  Radial artery  Line Secured:  Antimicrobial disc, tape and transparent dressing  Events: patient tolerated procedure well with no complications

## 2020-07-21 NOTE — ANESTHESIA TIME REPORT
Anesthesia Start and Stop Event Times     Date Time Event    7/20/2020 2305 Ready for Procedure     2307 Anesthesia Start    7/21/2020 0100 Anesthesia Stop        Responsible Staff  07/20/20 to 07/21/20    Name Role Begin End    Gonzalez Rico M.D. Anesth 2307 0100        Preop Diagnosis (Free Text):  Pre-op Diagnosis             Preop Diagnosis (Codes):    Post op Diagnosis  Intracerebral aneurysm      Premium Reason  A. 3PM - 7AM    Comments:

## 2020-07-21 NOTE — ED NOTES
Pt laying in bed. Tearful from pain. Lights dimmed and pt given warm blankets for comfort respirations even and unlabored. call light within reach. cardene drip decreased to 5 mg/hr. Pt saturations decreased to 85% when pt falling asleep. Pt placed on O2 2L NC. Pt sat increased to to 98% on the O2.

## 2020-07-21 NOTE — PROGRESS NOTES
Pt alert and oriented.  Complaints of headache, meds given.  RIOS, strong.  Pedal pulses strong.  Denies numbness and tingling.  Right groin with dressing, small spot of blood on it, otherwise clean and dry.  VSS.  Family updated.  Report given to ICU RN.

## 2020-07-21 NOTE — ANESTHESIA QCDR
2019 [facility] Clinical Data Registry (for Quality Improvement)     Postoperative nausea/vomiting risk protocol (Adult = 18 yrs and Pediatric 3-17 yrs)- (430 and 463)  General inhalation anesthetic (NOT TIVA) with PONV risk factors: Yes  Provision of anti-emetic therapy with at least 2 different classes of agents: Yes   Patient DID NOT receive anti-emetic therapy and reason is documented in Medical Record:  N/A    Multimodal Pain Management- (477)  Non-emergent surgery AND patient age >= 18: No  Use of Multimodal Pain Management, two or more drugs and/or interventions, NOT including systemic opioids:   Exception: Documented allergy to multiple classes of analgesics:     Smoking Abstinence (404)  Patient is current smoker (cigarette, pipe, e-cig, marijuanna): Yes  Elective Surgery: No  Abstinence instructions provided prior to day of surgery:   Patient abstained from smoking on day of surgery:     Pre-Op Beta-Blocker in Isolated CABG (44)  Isolated CABG AND patient age >= 18: No  Beta-blocker admin within 24 hours of surgical incision:   Exception:of medical reason(s) for not administering beta blocker within 24 hours prior to surgical incision (e.g., not  indicated,other medical reason):     PACU assessment of acute postoperative pain prior to Anesthesia Care End- Applies to Patients Age = 18- (ABG7)  Initial PACU pain score is which of the following: < 7/10  Patient unable to report pain score: N/A    Post-anesthetic transfer of care checklist/protocol to PACU/ICU- (426 and 427)  Upon conclusion of case, patient transferred to which of the following locations: PACU/Non-ICU  Use of transfer checklist/protocol: Yes  Exclusion: Service Performed in Patient Hospital Room (and thus did not require transfer): N/A  Unplanned admission to ICU related to anesthesia service up through end of PACU care- (MD51)  Unplanned admission to ICU (not initially anticipated at anesthesia start time): No

## 2020-07-21 NOTE — ASSESSMENT & PLAN NOTE
H&H 1 Amezquita 1 SAH ruptured PCOM s/p coiling  SBP < 160  Neurochecks every 4 hours  Nimodipine for DCI prevention  Daily TCDs, no vasospasms  Continue adequate bp controle  Continue ICU due to high risk vasospasms  Keppra PPX x 7 days  PT/OT/SLP  Ct head 7/22 stable, no changes  Ongoing intermittent ha, negative tcd  Changed pain regimen for headache  Ok to transfer out of ICU

## 2020-07-21 NOTE — ED PROVIDER NOTES
ED Provider Note    CHIEF COMPLAINT  Chief Complaint   Patient presents with   • Headache   • Blurred Vision       \[facility]\""  Adalid Clarisse Hurley is a 40 y.o. female who presents with a headache.  The patient states the headache started approximately 30 minutes prior to arrival.  She states the pain is in the occipital region.  She describes the pain as sharp.  She is unaware of any exacerbating or relieving factors.  She states that severe in intensity.  She states she does have binocular blurred vision.  But no visual field cuts.  Otherwise she does not have any paresthesias nor functional loss of her extremities.  She does not have a history of headaches.  She states she is a history of hypertension and states she is had an ischemic stroke in the past with no residual symptoms.    REVIEW OF SYSTEMS  See HPI for further details. All other systems are negative.     PAST MEDICAL HISTORY  Past Medical History:   Diagnosis Date   • Asthma    • Depression    • Hx of cholecystectomy    • Kidney disease    • PTSD (post-traumatic stress disorder)        FAMILY HISTORY  [unfilled]    SOCIAL HISTORY  Social History     Socioeconomic History   • Marital status: Single     Spouse name: Not on file   • Number of children: Not on file   • Years of education: Not on file   • Highest education level: Not on file   Occupational History   • Not on file   Social Needs   • Financial resource strain: Not on file   • Food insecurity     Worry: Not on file     Inability: Not on file   • Transportation needs     Medical: Not on file     Non-medical: Not on file   Tobacco Use   • Smoking status: Current Every Day Smoker     Packs/day: 0.10   Substance and Sexual Activity   • Alcohol use: No   • Drug use: Yes     Types: Inhaled     Comment: eleazar occ   • Sexual activity: Not on file   Lifestyle   • Physical activity     Days per week: Not on file     Minutes per session: Not on file   • Stress: Not on file   Relationships   • Social  "connections     Talks on phone: Not on file     Gets together: Not on file     Attends [employer] service: Not on file     Active member of club or organization: Not on file     Attends meetings of clubs or organizations: Not on file     Relationship status: Not on file   • Intimate partner violence     Fear of current or ex partner: Not on file     Emotionally abused: Not on file     Physically abused: Not on file     Forced sexual activity: Not on file   Other Topics Concern   • Not on file   Social History Narrative   • Not on file       SURGICAL HISTORY  Past Surgical History:   Procedure Laterality Date   • CHOLECYSTECTOMY     • PB  DELIVERY ONLY     • TUBAL LIGATION         CURRENT MEDICATIONS  Home Medications    **Home medications have not yet been reviewed for this encounter**         ALLERGIES  Allergies   Allergen Reactions   • Motrin [Ibuprofen]    • Pcn [Penicillins]        PHYSICAL EXAM  VITAL SIGNS: BP (!) 204/100   Pulse 83   Temp 37.1 °C (98.8 °F) (Temporal)   Resp 18   Ht 1.626 m (5' 4\")   Wt 73 kg (161 lb)   SpO2 100%   BMI 27.64 kg/m²       Constitutional: in acute distress, Non-toxic appearance.   HENT: Normocephalic, Atraumatic, Bilateral external ears normal, Oropharynx moist, No oral exudates, Nose normal.   Eyes: PERRLA, EOMI, Conjunctiva normal, No discharge.   Neck: Normal range of motion, No tenderness, Supple, No stridor.   Lymphatic: No lymphadenopathy noted.   Cardiovascular: Normal heart rate, Normal rhythm, No murmurs, No rubs, No gallops.   Thorax & Lungs: Normal breath sounds, No respiratory distress, No wheezing, No chest tenderness.   Abdomen: Bowel sounds normal, Soft, No tenderness, No masses, No pulsatile masses.   Skin: Warm, Dry, No erythema, No rash.   Back: No tenderness, No CVA tenderness.   Extremities: Intact distal pulses, No edema, No tenderness, No cyanosis, No clubbing.   Neurologic: Alert & oriented x 3, Normal motor function, Normal sensory function, " No focal deficits noted.   Psychiatric: Affect normal, Judgment normal, Mood normal.         RADIOLOGY/PROCEDURES  CT-CTA HEAD WITH & W/O-POST PROCESS   Final Result         1.  3.1 mm right aneurysm, likely from the supraclinoid segment.   2.  Right sylvian fissure, frontal, and bilateral basal cistern, appearance compatible subarachnoid hemorrhage.      These findings were discussed with the patient's clinician, Daniel Leiva, on 7/20/2020 9:51 PM.      CT-CTA NECK WITH & W/O-POST PROCESSING   Final Result         1.  CT angiogram of the neck within normal limits.               COURSE & MEDICAL DECISION MAKING  Pertinent Labs & Imaging studies reviewed. (See chart for details)  This a 40-year-old female who presents with a headache as well as some blurred vision.  On arrival she was in significant discomfort with hypertensive emergency.  She was therefore treated aggressively intravenous morphine.  This was not effective and when she returned from CT she continued have discomfort and I spoke with the radiologist who noted a aneurysm with a small amount of hemorrhage.  I have spoke with pharmacy will start the patient on nicardipine drip.  The patient will initially receive labetalol 20 mg as well as 1 mg of Dilaudid.  I currently have the interventional radiologist on call for possible intervention.    I spoke with the interventional radiologist and he will take the patient for interventional treatment.  I have also spoke with the intensivist will admit the patient to the ICU after interventional treatment in guarded condition.  She continues to be neurologically intact.  Her blood pressure starting to come down after the patient did receive 20 mg of labetalol.  We are currently awaiting a nicardipine drip per pharmacy.    FINAL IMPRESSION  1.  Intracranial aneurysm  2.  Subarachnoid hemorrhage  3.  Critical care time 30 minutes  4.  Hypertensive emergency    Disposition  The patient will be admitted in  guarded condition         Electronically signed by: Daniel Leiva M.D., 7/20/2020 8:10 PM

## 2020-07-21 NOTE — DISCHARGE PLANNING
Hospital Care Management Discharge Planning       Anticipated Discharge Disposition:   · To be determined      Action:   · Patient discussed in interdisciplinary ICU rounds  · Pt had aneurism coiled yesterday  · Pt with TCD monitoring in the ICU  · Per report, Pt tested positive for cocaine, opiates, and marijuana  · Per neurosurgery note, Pt may need EVD placed          Barriers to Discharge:   · To be determined      Plan:   · Offer SA resources to the Pt   · Continue to provide support services and assistance with discharge planning as needed.

## 2020-07-21 NOTE — ASSESSMENT & PLAN NOTE
Amlodipine and lisinopril, hold lisinopril, change to hydralazine scheduled with hyperkalemia  Prn medications  Goal SBP < 160  Continue hydralazine, continue to hold lisinopril

## 2020-07-21 NOTE — OR SURGEON
Immediate Post- Operative Note        PostOp Diagnosis:Ruptured PCOM aneurysm    Procedure(s): Endovascular repair      Estimated Blood Loss: Less than 5 ml        Complications: None            7/21/2020     12:39 AM     Wil Reeves M.D.

## 2020-07-21 NOTE — H&P
History and Physical    Date: 2020    PCP: Pcp Pt States None      CC: Headache    HPI: This is a 40 y.o. female who is presenting with headache.    Past Medical History:   Diagnosis Date   • Asthma    • Depression    • Hx of cholecystectomy    • Kidney disease    • PTSD (post-traumatic stress disorder)        Past Surgical History:   Procedure Laterality Date   • CHOLECYSTECTOMY     • PB  DELIVERY ONLY     • TUBAL LIGATION         Current Facility-Administered Medications   Medication Dose Route Frequency Provider Last Rate Last Dose   • senna-docusate (PERICOLACE or SENOKOT S) 8.6-50 MG per tablet 2 Tab  2 Tab Oral BID Norm Marie M.D.        And   • polyethylene glycol/lytes (MIRALAX) PACKET 1 Packet  1 Packet Oral QDAY PRN Norm Marie M.D.        And   • magnesium hydroxide (MILK OF MAGNESIA) suspension 30 mL  30 mL Oral QDAY PRN Norm Marie M.D.        And   • bisacodyl (DULCOLAX) suppository 10 mg  10 mg Rectal QDAY PRSHERI Marie M.D.       • Respiratory Therapy Consult   Nebulization Continuous RT Norm Marie M.D.       • acetaminophen (TYLENOL) tablet 650 mg  650 mg Oral Q6HRS PRN Norm Marie M.D.       • labetalol (NORMODYNE/TRANDATE) injection 10 mg  10 mg Intravenous Q4HRS PRSHERI Marie M.D.       • ondansetron (ZOFRAN) syringe/vial injection 4 mg  4 mg Intravenous Q4HRS PRN Norm Marie M.D.       • ondansetron (ZOFRAN ODT) dispertab 4 mg  4 mg Oral Q4HRS PRN Norm Marie M.D.       • promethazine (PHENERGAN) tablet 12.5-25 mg  12.5-25 mg Oral Q4HRS PRN Norm Marie M.D.       • promethazine (PHENERGAN) suppository 12.5-25 mg  12.5-25 mg Rectal Q4HRS PRN Norm Marie M.D.       • prochlorperazine (COMPAZINE) injection 5-10 mg  5-10 mg Intravenous Q4HRS PRN Norm Marie M.D.       • niCARdipine (CARDENE) 25 mg in  mL Infusion  0-15 mg/hr Intravenous Continuous Norm Marie M.D.       • oxyCODONE immediate-release  (ROXICODONE) tablet 5 mg  5 mg Oral Q4HRS PRN Norm Marie M.D.       • morphine (pf) 4 MG/ML injection 4 mg  4 mg Intravenous Q2HRS PRN Norm Marie M.D.       • niMODipine (NIMOTOP) capsule 60 mg  60 mg Oral Q4HRS Norm Marie M.D.       • levETIRAcetam (Keppra) 500 mg in 100 mL NaCl IV premix  500 mg Intravenous Q12HRS Norm Marie M.D.       • FENTANYL CITRATE (PF) 0.05 MG/ML INJ SOLN (WRAPPED)              Current Outpatient Medications   Medication Sig Dispense Refill   • metoprolol SR (TOPROL XL) 25 MG TABLET SR 24 HR Take 25 mg by mouth every day.     • hydroCHLOROthiazide (HYDRODIURIL) 25 MG Tab Take 25 mg by mouth every day.     • lisinopril (PRINIVIL) 40 MG tablet Take 40 mg by mouth every day.     • amlodipine (NORVASC) 10 MG Tab Take 10 mg by mouth every day.     • citalopram (CELEXA) 40 MG Tab Take 40 mg by mouth every day.          Social History     Socioeconomic History   • Marital status: Single     Spouse name: Not on file   • Number of children: Not on file   • Years of education: Not on file   • Highest education level: Not on file   Occupational History   • Not on file   Social Needs   • Financial resource strain: Not on file   • Food insecurity     Worry: Not on file     Inability: Not on file   • Transportation needs     Medical: Not on file     Non-medical: Not on file   Tobacco Use   • Smoking status: Current Every Day Smoker     Packs/day: 0.10   Substance and Sexual Activity   • Alcohol use: No   • Drug use: Yes     Types: Inhaled     Comment: eleazar occ   • Sexual activity: Not on file   Lifestyle   • Physical activity     Days per week: Not on file     Minutes per session: Not on file   • Stress: Not on file   Relationships   • Social connections     Talks on phone: Not on file     Gets together: Not on file     Attends [employer] service: Not on file     Active member of club or organization: Not on file     Attends meetings of clubs or organizations: Not on file      Relationship status: Not on file   • Intimate partner violence     Fear of current or ex partner: Not on file     Emotionally abused: Not on file     Physically abused: Not on file     Forced sexual activity: Not on file   Other Topics Concern   • Not on file   Social History Narrative   • Not on file       No family history on file.    Allergies:  Motrin [ibuprofen] and Pcn [penicillins]        Physical Exam:    Allert and oriented     Vital Signs  Blood Pressure: 160/94   Temperature: 37.1 °C (98.8 °F)   Pulse: 80   Respiration: 18   Pulse Oximetry: 100 %        Labs:  Recent Labs     07/20/20 1944   WBC 4.6*   RBC 4.74   HEMOGLOBIN 13.1   HEMATOCRIT 42.0   MCV 88.6   MCH 27.6   MCHC 31.2*   RDW 48.6   PLATELETCT 164   MPV 12.1     Recent Labs     07/20/20 1944   SODIUM 139   POTASSIUM 3.5*   CHLORIDE 102   CO2 24   GLUCOSE 88   BUN 13   CREATININE 1.14   CALCIUM 9.3     Recent Labs     07/20/20 1944   APTT 30.5   INR 1.01     Recent Labs     07/20/20 1944   ASTSGOT 7*   ALTSGPT 9   TBILIRUBIN 0.3   ALKPHOSPHAT 98   GLOBULIN 3.1   INR 1.01       Radiology:  CT-CTA HEAD WITH & W/O-POST PROCESS   Final Result         1.  3.1 mm right aneurysm, likely from the supraclinoid segment.   2.  Right sylvian fissure, frontal, and bilateral basal cistern, appearance compatible subarachnoid hemorrhage.      These findings were discussed with the patient's clinician, Daniel Leiva, on 7/20/2020 9:51 PM.      CT-CTA NECK WITH & W/O-POST PROCESSING   Final Result         1.  CT angiogram of the neck within normal limits.         US-INTRACRANIAL ARTERY COMP    (Results Pending)   EC-ECHOCARDIOGRAM COMPLETE W/O CONT    (Results Pending)   IR-EMBOLIZATION    (Results Pending)             Assessment and Plan:This is a 40 y.o. with SAH.The CTA shows Right POCM ruptured aneurysm    Plan:Endoavasular repair

## 2020-07-21 NOTE — CARE PLAN
Problem: Pain Management  Goal: Pain level will decrease to patient's comfort goal  Outcome: PROGRESSING AS EXPECTED  NOTE: Patient educated on the pain scale. Patient able to communicate pain. Medicated per MAR.     Problem: Safety - Medical Restraint  Goal: Free from restraint(s) (Restraint for Interference with Medical Device)  Description: INTERVENTIONS:  1. ONCE/SHIFT or MINIMUM Q12H: Assess and document the continuing need for restraints  2. Q24H: Continued use of restraint requires LIP to perform face to face examination and written order  3. Identify and implement measures to help patient regain control  Outcome: MET

## 2020-07-21 NOTE — ANESTHESIA PROCEDURE NOTES
Airway    Date/Time: 7/20/2020 11:19 PM  Performed by: Gonzalez Rico M.D.  Authorized by: Gonzalez Rico M.D.     Location:  OR  Urgency:  Elective  Indications for Airway Management:  Anesthesia      Spontaneous Ventilation: absent    Sedation Level:  Deep  Preoxygenated: Yes    Patient Position:  Sniffing  Final Airway Type:  Endotracheal airway  Final Endotracheal Airway:  ETT  Cuffed: Yes    Technique Used for Successful ETT Placement:  Direct laryngoscopy    Insertion Site:  Oral  Blade Type:  Michelle  Laryngoscope Blade/Videolaryngoscope Blade Size:  3  ETT Size (mm):  7.0  Measured from:  Lips  ETT to Lips (cm):  21  Placement Verified by: auscultation and capnometry    Cormack-Lehane Classification:  Grade I - full view of glottis  Number of Attempts at Approach:  1  Number of Other Approaches Attempted:  0

## 2020-07-21 NOTE — PROGRESS NOTES
Critical Care Progress Note    Date of admission  7/20/2020    Chief Complaint  40 y.[facility]. [facility]    [facility]    7/20 admitted for SAH H&H 1, Amezquita grade 1, from ruptured PCOM aneurysm s/p coiling      Interval Problem Update  Reviewed last 24 hour events:   - Headache  - GCS: 15    - Neurological exam:No focal deficits   - Monitoring: TCDs with no vasospasm  - Tm: 37.1   - HR: 80s   - SBP: 140-150s   - UOP: 1.4   - Bowel Movement: -   - Nutrition: regular diet   - Na:139        Review of Systems  Review of Systems   Constitutional: Negative for chills and fever.   HENT: Negative.    Eyes: Negative.    Respiratory: Negative for shortness of breath.    Cardiovascular: Negative for chest pain.   Gastrointestinal: Negative for abdominal pain, nausea and vomiting.   Genitourinary: Negative.    Musculoskeletal: Negative.    Skin: Negative.    Neurological: Positive for headaches. Negative for focal weakness.   Psychiatric/Behavioral: Negative.         Vital Signs for last 24 hours   Temp:  [35.3 °C (95.6 °F)-37.1 °C (98.8 °F)] 35.3 °C (95.6 °F)  Pulse:  [66-98] 84  Resp:  [7-25] 18  BP: (132-221)/() 146/92  SpO2:  [89 %-100 %] 98 %    Hemodynamic parameters for last 24 hours       Respiratory Information for the last 24 hours       Physical Exam   Physical Exam  Constitutional:       Appearance: Normal appearance.   HENT:      Head: Normocephalic and atraumatic.      Right Ear: External ear normal.      Left Ear: External ear normal.      Nose: Nose normal.   Eyes:      Pupils: Pupils are equal, round, and reactive to light.   Cardiovascular:      Rate and Rhythm: Normal rate and regular rhythm.      Pulses: Normal pulses.   Pulmonary:      Effort: Pulmonary effort is normal.   Abdominal:      General: Abdomen is flat.   Neurological:      General: No focal deficit present.      Mental Status: She is alert and oriented to person, place, and time. Mental status  is at baseline.      Cranial Nerves: No cranial nerve deficit.      Motor: No weakness.   Psychiatric:         Mood and Affect: Mood normal.         Behavior: Behavior normal.         Medications  Current Facility-Administered Medications   Medication Dose Route Frequency Provider Last Rate Last Dose   • albuterol inhaler 2 Puff  2 Puff Inhalation Q4H PRN (RT) Norm Marie M.D.       • senna-docusate (PERICOLACE or SENOKOT S) 8.6-50 MG per tablet 2 Tab  2 Tab Oral BID Norm Mraie M.D.   2 Tab at 07/21/20 0517    And   • polyethylene glycol/lytes (MIRALAX) PACKET 1 Packet  1 Packet Oral QDAY PRN Norm Marie M.D.        And   • magnesium hydroxide (MILK OF MAGNESIA) suspension 30 mL  30 mL Oral QDAY PRN Norm Marie M.D.        And   • bisacodyl (DULCOLAX) suppository 10 mg  10 mg Rectal QDAY PRN Norm Marie M.D.       • Respiratory Therapy Consult   Nebulization Continuous RT Norm Marie M.D.       • acetaminophen (TYLENOL) tablet 650 mg  650 mg Oral Q6HRS PRN Norm Marie M.D.       • labetalol (NORMODYNE/TRANDATE) injection 10 mg  10 mg Intravenous Q4HRS PRN Norm Marie M.D.   10 mg at 07/21/20 0204   • ondansetron (ZOFRAN) syringe/vial injection 4 mg  4 mg Intravenous Q4HRS PRN Norm Marie M.D.       • ondansetron (ZOFRAN ODT) dispertab 4 mg  4 mg Oral Q4HRS PRN Norm Marie M.D.       • promethazine (PHENERGAN) tablet 12.5-25 mg  12.5-25 mg Oral Q4HRS PRN Norm Marie M.D.       • promethazine (PHENERGAN) suppository 12.5-25 mg  12.5-25 mg Rectal Q4HRS PRN Norm Marie M.D.       • prochlorperazine (COMPAZINE) injection 5-10 mg  5-10 mg Intravenous Q4HRS PRN Norm Marie M.D.       • niCARdipine (CARDENE) 25 mg in  mL Infusion  0-15 mg/hr Intravenous Continuous Leigh Ann Pleitez M.D.       • oxyCODONE immediate-release (ROXICODONE) tablet 5 mg  5 mg Oral Q4HRS PRN Norm Marie M.D.       • morphine (pf) 4 MG/ML injection 4 mg  4 mg Intravenous  Q2HRS PRN Norm Marie M.D.   4 mg at 07/21/20 0440   • niMODipine (NIMOTOP) capsule 60 mg  60 mg Oral Q4HRS Norm Marie M.D.   60 mg at 07/21/20 0517   • levETIRAcetam (Keppra) 500 mg in 100 mL NaCl IV premix  500 mg Intravenous Q12HRS Norm Marie M.D.   Stopped at 07/21/20 0539       Fluids    Intake/Output Summary (Last 24 hours) at 7/21/2020 0647  Last data filed at 7/21/2020 0600  Gross per 24 hour   Intake 1120 ml   Output 1450 ml   Net -330 ml       Laboratory          Recent Labs     07/20/20 1944   SODIUM 139   POTASSIUM 3.5*   CHLORIDE 102   CO2 24   BUN 13   CREATININE 1.14   CALCIUM 9.3     Recent Labs     07/20/20 1944   ALTSGPT 9   ASTSGOT 7*   ALKPHOSPHAT 98   TBILIRUBIN 0.3   GLUCOSE 88     Recent Labs     07/20/20 1944   WBC 4.6*   NEUTSPOLYS 40.60*   LYMPHOCYTES 48.00*   MONOCYTES 7.00   EOSINOPHILS 3.30   BASOPHILS 0.90   ASTSGOT 7*   ALTSGPT 9   ALKPHOSPHAT 98   TBILIRUBIN 0.3     Recent Labs     07/20/20 1944   RBC 4.74   HEMOGLOBIN 13.1   HEMATOCRIT 42.0   PLATELETCT 164   PROTHROMBTM 13.6   APTT 30.5   INR 1.01       Imaging  CT:    Reviewed    Assessment/Plan  * SAH (subarachnoid hemorrhage) (HCC)  Assessment & Plan  H&H 1 Amezquita 1 SAH ruptured PCOM s/p coiling  SBP < 160  Neurochecks per protocol  Nimodipine for DCI prevention  Daily TCDs  Keppra PPX x 7 days  PT/OT/SLP        Pain, chronic  Assessment & Plan  Resume home regimen    HTN (hypertension)  Assessment & Plan  Will start to resume home regimen  Goal SBP < 160       VTE:  Not Indicated  Ulcer: Not Indicated  Lines: None    I have performed a physical exam and reviewed and updated ROS and Plan today (7/21/2020). In review of yesterday's note (7/20/2020), there are no changes except as documented above.     Discussed patient condition and risk of morbidity and/or mortality with RN, Pharmacy, Charge nurse / hot rounds, Patient and neurosurgery

## 2020-07-21 NOTE — CONSULTS
Neurosurgery Consult Note    Patient: Adalid Hurley    MRN: [id number]    Date of Consultation: 7/21/2020    Reason for Consultation: aneurysmal SAH    Referring Physician: ED    History of Present Illness:  40 yr old female presented with severe HA, found to have SAH with right PCOM aneurysm. Treated and secured by Dr. Trujillo. Currently has a headache but no other complaints. No neuro deficits. Presented with Avilez&Grimes 1, Amezquita 1.     Review of Systems:  Constitutional: Denies fevers, chills, night sweats, or weight changes  Eyes: Denies changes in vision, or eye pain  Ears/Nose/Throat/Mouth: Denies nasal congestion or sore throat   Cardiovascular: Denies chest pain or palpitations   Respiratory: Denies shortness of breath, or cough  Gastrointestinal/Hepatic: Denies abdominal pain, nausea, vomiting, diarrhea, or constipation  Genitourinary: Denies dysuria, frequency, or incontinence  Musculoskeletal/Rheum: Denies joint pain or swelling   Skin: Denies rash  Neurological: Denies headache, confusion, memory loss, focal weakness, or parasthesias  Psychiatric: Denies mood disorder   Endocrine: Denies hx of diabetes or thyroid dysfunction  Heme/Oncology/Lymph Nodes: Denies enlarged lymph nodes, bruising, or known bleeding disorder  Allergic/Immunologic: Denies hx of autoimmune disorder      Medications:  Current Facility-Administered Medications   Medication Dose Route Frequency Provider Last Rate Last Dose   • albuterol inhaler 2 Puff  2 Puff Inhalation Q4H PRN (RT) Norm Marie M.D.       • senna-docusate (PERICOLACE or SENOKOT S) 8.6-50 MG per tablet 2 Tab  2 Tab Oral BID Norm Marie M.D.   2 Tab at 07/21/20 0517    And   • polyethylene glycol/lytes (MIRALAX) PACKET 1 Packet  1 Packet Oral QDAY PRN Norm Marie M.D.        And   • magnesium hydroxide (MILK OF MAGNESIA) suspension 30 mL  30 mL Oral QDAY PRN Norm Marie M.D.        And   • bisacodyl (DULCOLAX) suppository 10 mg  10 mg Rectal  QDAY PRN Norm Marie M.D.       • Respiratory Therapy Consult   Nebulization Continuous RT Norm Marie M.D.       • acetaminophen (TYLENOL) tablet 650 mg  650 mg Oral Q6HRS PRSHERI Marie M.D.       • labetalol (NORMODYNE/TRANDATE) injection 10 mg  10 mg Intravenous Q4HRS PRSHERI Marie M.D.   10 mg at 20 0204   • ondansetron (ZOFRAN) syringe/vial injection 4 mg  4 mg Intravenous Q4HRS PRN Norm Marie M.D.       • ondansetron (ZOFRAN ODT) dispertab 4 mg  4 mg Oral Q4HRS PRSHERI Marie M.D.       • promethazine (PHENERGAN) tablet 12.5-25 mg  12.5-25 mg Oral Q4HRS SOILA Marie M.D.       • promethazine (PHENERGAN) suppository 12.5-25 mg  12.5-25 mg Rectal Q4HRS PRN Norm Marie M.D.       • prochlorperazine (COMPAZINE) injection 5-10 mg  5-10 mg Intravenous Q4HRS PRSHERI Marie M.D.       • niCARdipine (CARDENE) 25 mg in  mL Infusion  0-15 mg/hr Intravenous Continuous Leigh Ann Pleitez M.D.       • oxyCODONE immediate-release (ROXICODONE) tablet 5 mg  5 mg Oral Q4HRS PRSHERI Marie M.D.       • morphine (pf) 4 MG/ML injection 4 mg  4 mg Intravenous Q2HRS PRSHERI Marie M.D.   4 mg at 20 0440   • niMODipine (NIMOTOP) capsule 60 mg  60 mg Oral Q4HRS Norm Marie M.D.   60 mg at 20 0517   • levETIRAcetam (Keppra) 500 mg in 100 mL NaCl IV premix  500 mg Intravenous Q12HRS Norm Marie M.D.   Stopped at 20 0539       Allergies:  Allergies   Allergen Reactions   • Motrin [Ibuprofen]    • Pcn [Penicillins]        Past Medical History:  Past Medical History:   Diagnosis Date   • Asthma    • Depression    • Hx of cholecystectomy    • Kidney disease    • PTSD (post-traumatic stress disorder)        Past Surgical History:  Past Surgical History:   Procedure Laterality Date   • CHOLECYSTECTOMY     • PB  DELIVERY ONLY     • TUBAL LIGATION         Family History:  No family history on file.    Social History:  Social History      Socioeconomic History   • Marital status: Single     Spouse name: Not on file   • Number of children: Not on file   • Years of education: Not on file   • Highest education level: Not on file   Occupational History   • Not on file   Social Needs   • Financial resource strain: Not on file   • Food insecurity     Worry: Not on file     Inability: Not on file   • Transportation needs     Medical: Not on file     Non-medical: Not on file   Tobacco Use   • Smoking status: Current Every Day Smoker     Packs/day: 0.10   Substance and Sexual Activity   • Alcohol use: No   • Drug use: Yes     Types: Inhaled     Comment: eleazar occ   • Sexual activity: Not on file   Lifestyle   • Physical activity     Days per week: Not on file     Minutes per session: Not on file   • Stress: Not on file   Relationships   • Social connections     Talks on phone: Not on file     Gets together: Not on file     Attends Muslim service: Not on file     Active member of club or organization: Not on file     Attends meetings of clubs or organizations: Not on file     Relationship status: Not on file   • Intimate partner violence     Fear of current or ex partner: Not on file     Emotionally abused: Not on file     Physically abused: Not on file     Forced sexual activity: Not on file   Other Topics Concern   • Not on file   Social History Narrative   • Not on file       Physical Examination:  AOx3. Reflexes and motor strength normal and symmetric. Cranial nerves 2-12 and sensation grossly intact.  speech normal, mental status intact, cranial nerves 2-12 intact, muscle tone normal, muscle strength normal  PERRLA BL, EOMI      Labs:  Recent Labs     07/20/20 1944   WBC 4.6*   RBC 4.74   HEMOGLOBIN 13.1   HEMATOCRIT 42.0   MCV 88.6   MCH 27.6   MCHC 31.2*   RDW 48.6   PLATELETCT 164   MPV 12.1     Recent Labs     07/20/20 1944   SODIUM 139   POTASSIUM 3.5*   CHLORIDE 102   CO2 24   GLUCOSE 88   BUN 13   CREATININE 1.14   CALCIUM 9.3      Recent Labs     07/20/20 1944   APTT 30.5   INR 1.01           Imaging:  CTA head:  1.  3.1 mm right aneurysm, likely from the supraclinoid segment.  2.  Right sylvian fissure, frontal, and bilateral basal cistern, appearance compatible subarachnoid hemorrhage.    Assessment and Plan:  Right PCOM aneurysm  SAH  POD 1 Coil embolization of aneurysm    Q1 neuro checks  I would let SBP ride up to 180mmhg before treating  No hydrocephalus and the patient is GCS 15, HH 1, hodges 1. Will continue close observation. May eventually need ventriculostomy and EVD.  SAH protocol.  Nimotop 60 mg q4.  PT/OT  SCDs/Okay for DVT ppx.        Kike Womack D.O.

## 2020-07-21 NOTE — DISCHARGE PLANNING
Medical Social Work    Referral: Family Support    Intervention: MSW met with pt's sister, Ann ([phone]) in the ER Lobby.  Pt's sister states that she's aware of pt going to IR but pt's significant other is at bedside and staff won't let pt's sister back at this time.  Pt's significant other then came out to the lobby and said that he was told pt's sister could go back.  MSW escorted pt's sister to briefly see pt prior to pt going to IR.  Emotional support provided to pt's family.    Plan: SW will follow as needed.

## 2020-07-21 NOTE — ASSESSMENT & PLAN NOTE
Morphine prn  Tylenol prn  Oxycodone prn  Adequate control currently  Avoid long term use due to positive drug screen multiple drugs including cocaine/opioids  Patient denies drug use  Changed pain regimen  Valproic acid scheduled, decadron can be used prn  Ms contin scheduled bid 4 days  Prn morphine adjusted frequency

## 2020-07-21 NOTE — ANESTHESIA PREPROCEDURE EVALUATION
Pt presented with sudden onset HA, weakness, and visual disturbances. Found to have SAH and PCOM aneurysm. She arrived to IR on nicardipine ggt to attempt to keep her SBP <150.    Relevant Problems   CARDIAC   (+) HTN (hypertension)       Physical Exam    Airway   Mallampati: II  TM distance: >3 FB  Neck ROM: full       Cardiovascular - normal exam  Rhythm: regular  Rate: normal  (-) murmur     Dental - normal exam    Comments: Bottom front teeth loose         Pulmonary - normal exam  Breath sounds clear to auscultation     Abdominal    Neurological - normal exam                 Anesthesia Plan    ASA 3- EMERGENT   ASA physical status 3 criteria: CVA or TIA - history (> 3 months)ASA physical status emergent criteria: neurologic compromise requiring immediate intervention    Plan - general       Airway plan will be ETT        Induction: intravenous and rapid sequence    Postoperative Plan: Postoperative administration of opioids is intended.    Pertinent diagnostic labs and testing reviewed    Informed Consent:    Anesthetic plan and risks discussed with patient.    Use of blood products discussed with: patient whom consented to blood products.

## 2020-07-22 ENCOUNTER — APPOINTMENT (OUTPATIENT)
Dept: RADIOLOGY | Facility: MEDICAL CENTER | Age: 40
DRG: 021 | End: 2020-07-22
Attending: NURSE PRACTITIONER

## 2020-07-22 ENCOUNTER — APPOINTMENT (OUTPATIENT)
Dept: RADIOLOGY | Facility: MEDICAL CENTER | Age: 40
DRG: 021 | End: 2020-07-22
Attending: PSYCHIATRY & NEUROLOGY

## 2020-07-22 ENCOUNTER — APPOINTMENT (OUTPATIENT)
Dept: CARDIOLOGY | Facility: MEDICAL CENTER | Age: 40
DRG: 021 | End: 2020-07-22
Attending: INTERNAL MEDICINE

## 2020-07-22 PROBLEM — R51.9 HEADACHE: Status: ACTIVE | Noted: 2020-07-22

## 2020-07-22 LAB
ANION GAP SERPL CALC-SCNC: 10 MMOL/L (ref 7–16)
BUN SERPL-MCNC: 15 MG/DL (ref 8–22)
CALCIUM SERPL-MCNC: 8.6 MG/DL (ref 8.5–10.5)
CHLORIDE SERPL-SCNC: 101 MMOL/L (ref 96–112)
CO2 SERPL-SCNC: 23 MMOL/L (ref 20–33)
CREAT SERPL-MCNC: 0.99 MG/DL (ref 0.5–1.4)
ERYTHROCYTE [DISTWIDTH] IN BLOOD BY AUTOMATED COUNT: 51.8 FL (ref 35.9–50)
GLUCOSE SERPL-MCNC: 92 MG/DL (ref 65–99)
HCT VFR BLD AUTO: 36.1 % (ref 37–47)
HGB BLD-MCNC: 11.1 G/DL (ref 12–16)
LV EJECT FRACT  99904: 50
LV EJECT FRACT MOD 2C 99903: 42.66
LV EJECT FRACT MOD 4C 99902: 49.63
LV EJECT FRACT MOD BP 99901: 44.51
MCH RBC QN AUTO: 27.7 PG (ref 27–33)
MCHC RBC AUTO-ENTMCNC: 30.7 G/DL (ref 33.6–35)
MCV RBC AUTO: 90 FL (ref 81.4–97.8)
PLATELET # BLD AUTO: 165 K/UL (ref 164–446)
PMV BLD AUTO: 12.1 FL (ref 9–12.9)
POTASSIUM SERPL-SCNC: 4.1 MMOL/L (ref 3.6–5.5)
RBC # BLD AUTO: 4.01 M/UL (ref 4.2–5.4)
SODIUM SERPL-SCNC: 134 MMOL/L (ref 135–145)
WBC # BLD AUTO: 8 K/UL (ref 4.8–10.8)

## 2020-07-22 PROCEDURE — 70450 CT HEAD/BRAIN W/O DYE: CPT

## 2020-07-22 PROCEDURE — 99233 SBSQ HOSP IP/OBS HIGH 50: CPT | Performed by: PSYCHIATRY & NEUROLOGY

## 2020-07-22 PROCEDURE — 700102 HCHG RX REV CODE 250 W/ 637 OVERRIDE(OP): Performed by: PSYCHIATRY & NEUROLOGY

## 2020-07-22 PROCEDURE — A9270 NON-COVERED ITEM OR SERVICE: HCPCS | Performed by: INTERNAL MEDICINE

## 2020-07-22 PROCEDURE — 770022 HCHG ROOM/CARE - ICU (200)

## 2020-07-22 PROCEDURE — 93306 TTE W/DOPPLER COMPLETE: CPT | Mod: 26 | Performed by: INTERNAL MEDICINE

## 2020-07-22 PROCEDURE — 85027 COMPLETE CBC AUTOMATED: CPT

## 2020-07-22 PROCEDURE — A9270 NON-COVERED ITEM OR SERVICE: HCPCS | Performed by: PSYCHIATRY & NEUROLOGY

## 2020-07-22 PROCEDURE — 700111 HCHG RX REV CODE 636 W/ 250 OVERRIDE (IP): Performed by: INTERNAL MEDICINE

## 2020-07-22 PROCEDURE — 700101 HCHG RX REV CODE 250: Performed by: PSYCHIATRY & NEUROLOGY

## 2020-07-22 PROCEDURE — 93306 TTE W/DOPPLER COMPLETE: CPT

## 2020-07-22 PROCEDURE — 700111 HCHG RX REV CODE 636 W/ 250 OVERRIDE (IP): Performed by: PSYCHIATRY & NEUROLOGY

## 2020-07-22 PROCEDURE — 93888 INTRACRANIAL LIMITED STUDY: CPT | Mod: 26 | Performed by: INTERNAL MEDICINE

## 2020-07-22 PROCEDURE — 93888 INTRACRANIAL LIMITED STUDY: CPT

## 2020-07-22 PROCEDURE — 80048 BASIC METABOLIC PNL TOTAL CA: CPT

## 2020-07-22 PROCEDURE — 700102 HCHG RX REV CODE 250 W/ 637 OVERRIDE(OP): Performed by: INTERNAL MEDICINE

## 2020-07-22 RX ORDER — DEXAMETHASONE SODIUM PHOSPHATE 4 MG/ML
6 INJECTION, SOLUTION INTRA-ARTICULAR; INTRALESIONAL; INTRAMUSCULAR; INTRAVENOUS; SOFT TISSUE EVERY 12 HOURS
Status: COMPLETED | OUTPATIENT
Start: 2020-07-22 | End: 2020-07-23

## 2020-07-22 RX ORDER — HEPARIN SODIUM 5000 [USP'U]/ML
5000 INJECTION, SOLUTION INTRAVENOUS; SUBCUTANEOUS EVERY 8 HOURS
Status: DISCONTINUED | OUTPATIENT
Start: 2020-07-22 | End: 2020-08-03 | Stop reason: HOSPADM

## 2020-07-22 RX ORDER — DEXAMETHASONE SODIUM PHOSPHATE 4 MG/ML
6 INJECTION, SOLUTION INTRA-ARTICULAR; INTRALESIONAL; INTRAMUSCULAR; INTRAVENOUS; SOFT TISSUE ONCE
Status: COMPLETED | OUTPATIENT
Start: 2020-07-22 | End: 2020-07-22

## 2020-07-22 RX ADMIN — DOCUSATE SODIUM 50 MG AND SENNOSIDES 8.6 MG 2 TABLET: 8.6; 5 TABLET, FILM COATED ORAL at 18:23

## 2020-07-22 RX ADMIN — MORPHINE SULFATE 4 MG: 4 INJECTION INTRAVENOUS at 08:36

## 2020-07-22 RX ADMIN — NIMODIPINE 60 MG: 30 CAPSULE ORAL at 01:39

## 2020-07-22 RX ADMIN — DOCUSATE SODIUM 50 MG AND SENNOSIDES 8.6 MG 2 TABLET: 8.6; 5 TABLET, FILM COATED ORAL at 05:15

## 2020-07-22 RX ADMIN — MORPHINE SULFATE 4 MG: 4 INJECTION INTRAVENOUS at 22:42

## 2020-07-22 RX ADMIN — NIMODIPINE 60 MG: 30 CAPSULE ORAL at 13:24

## 2020-07-22 RX ADMIN — NIMODIPINE 60 MG: 30 CAPSULE ORAL at 22:41

## 2020-07-22 RX ADMIN — GABAPENTIN 300 MG: 300 CAPSULE ORAL at 13:24

## 2020-07-22 RX ADMIN — MORPHINE SULFATE 4 MG: 4 INJECTION INTRAVENOUS at 01:39

## 2020-07-22 RX ADMIN — MORPHINE SULFATE 4 MG: 4 INJECTION INTRAVENOUS at 06:29

## 2020-07-22 RX ADMIN — NIMODIPINE 60 MG: 30 CAPSULE ORAL at 18:23

## 2020-07-22 RX ADMIN — MORPHINE SULFATE 4 MG: 4 INJECTION INTRAVENOUS at 14:04

## 2020-07-22 RX ADMIN — DEXAMETHASONE SODIUM PHOSPHATE 6 MG: 4 INJECTION, SOLUTION INTRA-ARTICULAR; INTRALESIONAL; INTRAMUSCULAR; INTRAVENOUS; SOFT TISSUE at 18:23

## 2020-07-22 RX ADMIN — HEPARIN SODIUM 5000 UNITS: 5000 INJECTION, SOLUTION INTRAVENOUS; SUBCUTANEOUS at 18:22

## 2020-07-22 RX ADMIN — OXYCODONE HYDROCHLORIDE 10 MG: 10 TABLET ORAL at 08:36

## 2020-07-22 RX ADMIN — MORPHINE SULFATE 4 MG: 4 INJECTION INTRAVENOUS at 18:37

## 2020-07-22 RX ADMIN — MORPHINE SULFATE 4 MG: 4 INJECTION INTRAVENOUS at 03:48

## 2020-07-22 RX ADMIN — LEVETIRACETAM 500 MG: 500 TABLET ORAL at 05:15

## 2020-07-22 RX ADMIN — NIMODIPINE 60 MG: 30 CAPSULE ORAL at 06:00

## 2020-07-22 RX ADMIN — OXYCODONE HYDROCHLORIDE 10 MG: 10 TABLET ORAL at 18:37

## 2020-07-22 RX ADMIN — GABAPENTIN 300 MG: 300 CAPSULE ORAL at 18:23

## 2020-07-22 RX ADMIN — OXYCODONE HYDROCHLORIDE 10 MG: 10 TABLET ORAL at 22:42

## 2020-07-22 RX ADMIN — LEVETIRACETAM 500 MG: 500 TABLET ORAL at 18:23

## 2020-07-22 RX ADMIN — NIMODIPINE 60 MG: 30 CAPSULE ORAL at 10:26

## 2020-07-22 RX ADMIN — LIDOCAINE 1 PATCH: 50 PATCH TOPICAL at 15:55

## 2020-07-22 RX ADMIN — GABAPENTIN 300 MG: 300 CAPSULE ORAL at 05:15

## 2020-07-22 RX ADMIN — DEXAMETHASONE SODIUM PHOSPHATE 6 MG: 4 INJECTION, SOLUTION INTRA-ARTICULAR; INTRALESIONAL; INTRAMUSCULAR; INTRAVENOUS; SOFT TISSUE at 09:40

## 2020-07-22 RX ADMIN — OXYCODONE HYDROCHLORIDE 10 MG: 10 TABLET ORAL at 14:04

## 2020-07-22 ASSESSMENT — PATIENT HEALTH QUESTIONNAIRE - PHQ9
SUM OF ALL RESPONSES TO PHQ9 QUESTIONS 1 AND 2: 0
SUM OF ALL RESPONSES TO PHQ9 QUESTIONS 1 AND 2: 0
1. LITTLE INTEREST OR PLEASURE IN DOING THINGS: NOT AT ALL
1. LITTLE INTEREST OR PLEASURE IN DOING THINGS: NOT AT ALL
2. FEELING DOWN, DEPRESSED, IRRITABLE, OR HOPELESS: NOT AT ALL

## 2020-07-22 ASSESSMENT — COGNITIVE AND FUNCTIONAL STATUS - GENERAL
DAILY ACTIVITIY SCORE: 24
SUGGESTED CMS G CODE MODIFIER MOBILITY: CH
MOBILITY SCORE: 24
SUGGESTED CMS G CODE MODIFIER DAILY ACTIVITY: CH

## 2020-07-22 ASSESSMENT — LIFESTYLE VARIABLES
ON A TYPICAL DAY WHEN YOU DRINK ALCOHOL HOW MANY DRINKS DO YOU HAVE: 0
ALCOHOL_USE: NO
HAVE PEOPLE ANNOYED YOU BY CRITICIZING YOUR DRINKING: NO
TOTAL SCORE: 0
CONSUMPTION TOTAL: NEGATIVE
EVER HAD A DRINK FIRST THING IN THE MORNING TO STEADY YOUR NERVES TO GET RID OF A HANGOVER: NO
HAVE YOU EVER FELT YOU SHOULD CUT DOWN ON YOUR DRINKING: NO
EVER FELT BAD OR GUILTY ABOUT YOUR DRINKING: NO
HOW MANY TIMES IN THE PAST YEAR HAVE YOU HAD 5 OR MORE DRINKS IN A DAY: 0
TOTAL SCORE: 0
AVERAGE NUMBER OF DAYS PER WEEK YOU HAVE A DRINK CONTAINING ALCOHOL: 0
TOTAL SCORE: 0

## 2020-07-22 ASSESSMENT — ENCOUNTER SYMPTOMS
EYES NEGATIVE: 1
PSYCHIATRIC NEGATIVE: 1
SHORTNESS OF BREATH: 0
MUSCULOSKELETAL NEGATIVE: 1
HEADACHES: 1
VOMITING: 0
FEVER: 0
FOCAL WEAKNESS: 0
NAUSEA: 0
CHILLS: 0
ABDOMINAL PAIN: 0

## 2020-07-22 ASSESSMENT — FIBROSIS 4 INDEX: FIB4 SCORE: 0.58

## 2020-07-22 NOTE — ASSESSMENT & PLAN NOTE
2/2 to SAH  May be harder to control given her chronic pain  Analgesic and antiemetics prn  Gabapentin    Received decadron  Tylenol/morphine/oxycodone prn  Positive drug tox, avoid long term use  Allergic to nsaids  Tolerating so far, intermittent ha  Valproic acid once, then scheduled  Short 2 day course ms contin  Stopped oxycodone  Avoid long term use narcotics as drug use outpt

## 2020-07-22 NOTE — CARE PLAN
Problem: Pain Management  Goal: Pain level will decrease to patient's comfort goal  Outcome: PROGRESSING SLOWER THAN EXPECTED  Intervention: Educate and implement non-pharmacologic comfort measures. Examples: relaxation, distration, play therapy, activity therapy, massage, etc.  Note: Pt's pain continues to be uncontrolled, meds administered PRN, nonpharmacological strategies for pain control reviewed and encouraged,OOB,ice packs, distraction.

## 2020-07-22 NOTE — CARE PLAN
Problem: Pain Management  Goal: Pain level will decrease to patient's comfort goal  Outcome: PROGRESSING AS EXPECTED  Note: PRN oxycodone and morphine in MAR. Patient complains of frequent headache pain, even to the point of tears at times. Will assess pain hourly with neuro checks.      Problem: Fluid Volume:  Goal: Will maintain balanced intake and output  Outcome: PROGRESSING AS EXPECTED  Note: Padilla d/c'd today per day shift. Patient able to transfer to b/s commode with ease and has no issues urinating at this time.

## 2020-07-22 NOTE — PROGRESS NOTES
Neurosurgery Progress Note    Subjective:  C/o headache, and neck changes, no vision deficits    Exam:  AO x 3, CN 2-12 grossly intact, NM: intact  Chin to chest: 4 finger width, grimacing with movement (no ho neck pain)     BP  Min: 109/58  Max: 142/75  Pulse  Av.7  Min: 70  Max: 93  Resp  Av.6  Min: 12  Max: 60  Temp  Av.7 °C (98.1 °F)  Min: 36.4 °C (97.6 °F)  Max: 36.9 °C (98.5 °F)  Monitored Temp 2  Av.7 °C (98.1 °F)  Min: 36.7 °C (98.1 °F)  Max: 36.7 °C (98.1 °F)  SpO2  Av.2 %  Min: 92 %  Max: 100 %    No data recorded    Recent Labs     20  0807   WBC 4.6* 8.0   RBC 4.74 4.01*   HEMOGLOBIN 13.1 11.1*   HEMATOCRIT 42.0 36.1*   MCV 88.6 90.0   MCH 27.6 27.7   MCHC 31.2* 30.7*   RDW 48.6 51.8*   PLATELETCT 164 165   MPV 12.1 12.1     Recent Labs     20  0807   SODIUM 139 134*   POTASSIUM 3.5* 4.1   CHLORIDE 102 101   CO2 24 23   GLUCOSE 88 92   BUN 13 15   CREATININE 1.14 0.99   CALCIUM 9.3 8.6     Recent Labs     20   APTT 30.5   INR 1.01           Intake/Output       20 - 20 0620 - 20 0659       Total  Total       Intake    P.O.  --  840 840  240  -- 240    P.O. -- 840 840 240 -- 240    I.V.  50  -- 50  --  -- --    Magnesium Sulfate Volume 50 -- 50 -- -- --    IV Piggyback  0  -- 0  --  -- --    Volume (mL) (levETIRAcetam (Keppra) 500 mg in 100 mL NaCl IV premix) 0 -- 0 -- -- --    Total Intake 50 840 890 240 -- 240       Output    Urine  850  900 1750  --  -- --    Number of Times Voided 1 x 3 x 4 x -- -- --    Urine Void (mL)  -- -- --    Output (mL) ([REMOVED] Urethral Catheter) 300 -- 300 -- -- --    Stool  --  -- --  --  -- --    Number of Times Stooled 0 x -- 0 x -- -- --    Total Output  -- -- --       Net I/O     -800 -60 -860 240 -- 240            Intake/Output Summary (Last 24 hours) at 2020 0902  Last data filed at  7/22/2020 0800  Gross per 24 hour   Intake 1130 ml   Output 1650 ml   Net -520 ml            • albuterol  2 Puff Q4H PRN (RT)   • levETIRAcetam  500 mg BID   • amLODIPine  10 mg DAILY   • lisinopril  40 mg DAILY   • oxyCODONE immediate-release  10 mg Q4HRS PRN   • lidocaine  1 Patch Q24HR   • gabapentin  300 mg TID   • senna-docusate  2 Tab BID    And   • polyethylene glycol/lytes  1 Packet QDAY PRN    And   • magnesium hydroxide  30 mL QDAY PRN    And   • bisacodyl  10 mg QDAY PRN   • Respiratory Therapy Consult   Continuous RT   • acetaminophen  650 mg Q6HRS PRN   • labetalol  10 mg Q4HRS PRN   • ondansetron  4 mg Q4HRS PRN   • ondansetron  4 mg Q4HRS PRN   • promethazine  12.5-25 mg Q4HRS PRN   • promethazine  12.5-25 mg Q4HRS PRN   • prochlorperazine  5-10 mg Q4HRS PRN   • morphine injection  4 mg Q2HRS PRN   • niMODipine  60 mg Q4HRS       Assessment and Plan:  POD # 2 ruptured PCOM aneurysm coiling Dr. Hines, IR  SBP < 160,   Neurochecks per protocol  Nimodipine for DCI prevention  Daily TCDs: today's results pending  Keppra PPX x 7 days    Acute on chronic HA/migrainous type with worsening neck stiffness: may benefit from steroid therapy.     Management by neuro intensivist.  No active role for Nsx. except monitoring for development of HCP, and need for EVD.   CT scan head ordered for today not completed yet: will reorder and review after completion.  If stable ok for Heparin DVT PPX from Nsx view.     D/w Dr. Ramirez.

## 2020-07-22 NOTE — PROGRESS NOTES
Critical Care Progress Note    Date of admission  7/20/2020    Chief Complaint  40 y.o. female admitted 7/20/2020 with SAH from R PCOM aneursym    Hospital Course    7/20 admitted for SAH H&H 1, Amezquita grade 1, from ruptured PCOM aneurysm s/p coiling      Interval Problem Update  Reviewed last 24 hour events:   - Headache improving  - GCS: 15    - Neurological exam:No focal deficits   - Monitoring: TCDs pending  - Tm: 36.9   - HR: 70s   - SBP: 110-120s   - UOP: 1.7   - Bowel Movement: -   - Nutrition: regular diet   - Na:139   - look great clinically this am   - Limited trial of decadron (48hrs) for headache.        Review of Systems  Review of Systems   Constitutional: Negative for chills and fever.   HENT: Negative.    Eyes: Negative.    Respiratory: Negative for shortness of breath.    Cardiovascular: Negative for chest pain.   Gastrointestinal: Negative for abdominal pain, nausea and vomiting.   Genitourinary: Negative.    Musculoskeletal: Negative.    Skin: Negative.    Neurological: Positive for headaches. Negative for focal weakness.   Psychiatric/Behavioral: Negative.         Vital Signs for last 24 hours   Temp:  [36.4 °C (97.6 °F)-36.9 °C (98.5 °F)] 36.9 °C (98.4 °F)  Pulse:  [70-93] 74  Resp:  [12-60] 15  BP: (109-142)/(58-90) 110/62  SpO2:  [92 %-100 %] 100 %    Hemodynamic parameters for last 24 hours       Respiratory Information for the last 24 hours       Physical Exam   Physical Exam  Constitutional:       Appearance: Normal appearance.   HENT:      Head: Normocephalic and atraumatic.      Right Ear: External ear normal.      Left Ear: External ear normal.      Nose: Nose normal.   Eyes:      Pupils: Pupils are equal, round, and reactive to light.   Cardiovascular:      Rate and Rhythm: Normal rate and regular rhythm.      Pulses: Normal pulses.   Pulmonary:      Effort: Pulmonary effort is normal.   Abdominal:      General: Abdomen is flat.   Neurological:      General: No focal deficit present.       Mental Status: She is alert and oriented to person, place, and time. Mental status is at baseline.      Cranial Nerves: No cranial nerve deficit.      Motor: No weakness.      Comments: GCS 15. A&O x 4. Cn2-12 intact. Full strength throughout.   Psychiatric:         Mood and Affect: Mood normal.         Behavior: Behavior normal.         Medications  Current Facility-Administered Medications   Medication Dose Route Frequency Provider Last Rate Last Dose   • albuterol inhaler 2 Puff  2 Puff Inhalation Q4H PRN (RT) Norm Marie M.D.       • levETIRAcetam (KEPPRA) tablet 500 mg  500 mg Oral BID Cristiano Carlisle M.D.   500 mg at 07/22/20 0515   • amLODIPine (NORVASC) tablet 10 mg  10 mg Oral DAILY Cristiano Carlisle M.D.   Stopped at 07/22/20 0600   • lisinopril (PRINIVIL) tablet 40 mg  40 mg Oral DAILY Cristiano Carlisle M.D.   Stopped at 07/22/20 0600   • oxyCODONE immediate release (ROXICODONE) tablet 10 mg  10 mg Oral Q4HRS PRN Cristiano Carlisle M.D.   10 mg at 07/21/20 1952   • lidocaine (LIDODERM) 5 % 1 Patch  1 Patch Transdermal Q24HR Cristiano Carlisle M.D.   1 Patch at 07/21/20 1524   • gabapentin (NEURONTIN) capsule 300 mg  300 mg Oral TID Cristiano Carlisle M.D.   300 mg at 07/22/20 0515   • senna-docusate (PERICOLACE or SENOKOT S) 8.6-50 MG per tablet 2 Tab  2 Tab Oral BID Norm Marie M.D.   2 Tab at 07/22/20 0515    And   • polyethylene glycol/lytes (MIRALAX) PACKET 1 Packet  1 Packet Oral QDAY PRN Norm Marie M.D.        And   • magnesium hydroxide (MILK OF MAGNESIA) suspension 30 mL  30 mL Oral QDAY PRN Norm Marie M.D.        And   • bisacodyl (DULCOLAX) suppository 10 mg  10 mg Rectal QDAY PRN Norm Marie M.D.       • Respiratory Therapy Consult   Nebulization Continuous RT Norm Marie M.D.       • acetaminophen (TYLENOL) tablet 650 mg  650 mg Oral Q6HRS PRN Norm Marie M.D.       • labetalol (NORMODYNE/TRANDATE) injection 10 mg  10 mg Intravenous Q4HRS PRN Norm Marie M.D.    10 mg at 07/21/20 0204   • ondansetron (ZOFRAN) syringe/vial injection 4 mg  4 mg Intravenous Q4HRS PRSHERI Marie M.D.       • ondansetron (ZOFRAN ODT) dispertab 4 mg  4 mg Oral Q4HRS PRSHERI Marie M.D.       • promethazine (PHENERGAN) tablet 12.5-25 mg  12.5-25 mg Oral Q4HRS PRSHERI Marie M.D.       • promethazine (PHENERGAN) suppository 12.5-25 mg  12.5-25 mg Rectal Q4HRS PRSHERI Marie M.D.       • prochlorperazine (COMPAZINE) injection 5-10 mg  5-10 mg Intravenous Q4HRS PRSHERI Marie M.D.       • niCARdipine (CARDENE) 25 mg in  mL Infusion  0-15 mg/hr Intravenous Continuous Leigh Ann Pleitez M.D.       • morphine (pf) 4 MG/ML injection 4 mg  4 mg Intravenous Q2HRS PRSHERI Marie M.D.   4 mg at 07/22/20 0629   • niMODipine (NIMOTOP) capsule 60 mg  60 mg Oral Q4HRS Cristiano Carlisle M.D.   60 mg at 07/22/20 0600       Fluids    Intake/Output Summary (Last 24 hours) at 7/22/2020 0654  Last data filed at 7/22/2020 0500  Gross per 24 hour   Intake 890 ml   Output 1750 ml   Net -860 ml       Laboratory          Recent Labs     07/20/20 1944   SODIUM 139   POTASSIUM 3.5*   CHLORIDE 102   CO2 24   BUN 13   CREATININE 1.14   CALCIUM 9.3     Recent Labs     07/20/20 1944   ALTSGPT 9   ASTSGOT 7*   ALKPHOSPHAT 98   TBILIRUBIN 0.3   GLUCOSE 88     Recent Labs     07/20/20 1944   WBC 4.6*   NEUTSPOLYS 40.60*   LYMPHOCYTES 48.00*   MONOCYTES 7.00   EOSINOPHILS 3.30   BASOPHILS 0.90   ASTSGOT 7*   ALTSGPT 9   ALKPHOSPHAT 98   TBILIRUBIN 0.3     Recent Labs     07/20/20 1944   RBC 4.74   HEMOGLOBIN 13.1   HEMATOCRIT 42.0   PLATELETCT 164   PROTHROMBTM 13.6   APTT 30.5   INR 1.01       Imaging  CT:    Reviewed    Assessment/Plan  * SAH (subarachnoid hemorrhage) (HCC)  Assessment & Plan  H&H 1 Amezquita 1 SAH ruptured PCOM s/p coiling  SBP < 160  Neurochecks per protocol  Nimodipine for DCI prevention  Daily TCDs  Keppra PPX x 7 days  PT/OT/SLP    Repeat CT  today        Headache  Assessment & Plan  2/2 to SAH  May be harder to control given her chronic pain  Analgesic and antiemetics prn  Gabapentin  300mg TID  Decadron 6mg BID for 48 hours     Pain, chronic  Assessment & Plan  Resume home regimen    HTN (hypertension)  Assessment & Plan  Will start to resume home regimen  Goal SBP < 160       VTE:  Not Indicated  Ulcer: Not Indicated  Lines: None    I have performed a physical exam and reviewed and updated ROS and Plan today (7/22/2020). In review of yesterday's note (7/21/2020), there are no changes except as documented above.     Discussed patient condition and risk of morbidity and/or mortality with RN, Pharmacy, Charge nurse / hot rounds, Patient and neurosurgery

## 2020-07-22 NOTE — PROGRESS NOTES
Pt pushed call light. Upon entry, pt was crying because of headache. Rated 10/10. Pt agreed it is the worst headache she has ever had. No other deficits/ Dr. Womack's on-call physician was paged (name?) and updated. Given pt's history with chronic pain and pharmacologic management, he was not concerned for a new neurological etiology. If pt develops neuro deficits, orders received to get a STAT CT.

## 2020-07-23 ENCOUNTER — APPOINTMENT (OUTPATIENT)
Dept: RADIOLOGY | Facility: MEDICAL CENTER | Age: 40
DRG: 021 | End: 2020-07-23
Attending: PSYCHIATRY & NEUROLOGY

## 2020-07-23 PROBLEM — Z00.6 RESEARCH STUDY PATIENT: Status: ACTIVE | Noted: 2020-07-23

## 2020-07-23 LAB
ANION GAP SERPL CALC-SCNC: 10 MMOL/L (ref 7–16)
BUN SERPL-MCNC: 14 MG/DL (ref 8–22)
CALCIUM SERPL-MCNC: 8.8 MG/DL (ref 8.5–10.5)
CHLORIDE SERPL-SCNC: 102 MMOL/L (ref 96–112)
CO2 SERPL-SCNC: 23 MMOL/L (ref 20–33)
CREAT SERPL-MCNC: 0.86 MG/DL (ref 0.5–1.4)
GLUCOSE SERPL-MCNC: 106 MG/DL (ref 65–99)
POTASSIUM SERPL-SCNC: 4.7 MMOL/L (ref 3.6–5.5)
SODIUM SERPL-SCNC: 135 MMOL/L (ref 135–145)

## 2020-07-23 PROCEDURE — 770022 HCHG ROOM/CARE - ICU (200)

## 2020-07-23 PROCEDURE — 700111 HCHG RX REV CODE 636 W/ 250 OVERRIDE (IP): Performed by: PSYCHIATRY & NEUROLOGY

## 2020-07-23 PROCEDURE — 93888 INTRACRANIAL LIMITED STUDY: CPT | Mod: 26 | Performed by: INTERNAL MEDICINE

## 2020-07-23 PROCEDURE — 700111 HCHG RX REV CODE 636 W/ 250 OVERRIDE (IP): Performed by: INTERNAL MEDICINE

## 2020-07-23 PROCEDURE — A9270 NON-COVERED ITEM OR SERVICE: HCPCS | Performed by: PSYCHIATRY & NEUROLOGY

## 2020-07-23 PROCEDURE — 80048 BASIC METABOLIC PNL TOTAL CA: CPT

## 2020-07-23 PROCEDURE — 93888 INTRACRANIAL LIMITED STUDY: CPT

## 2020-07-23 PROCEDURE — 99291 CRITICAL CARE FIRST HOUR: CPT | Performed by: INTERNAL MEDICINE

## 2020-07-23 PROCEDURE — 700102 HCHG RX REV CODE 250 W/ 637 OVERRIDE(OP): Performed by: PSYCHIATRY & NEUROLOGY

## 2020-07-23 PROCEDURE — 700101 HCHG RX REV CODE 250: Performed by: PSYCHIATRY & NEUROLOGY

## 2020-07-23 RX ORDER — CALCIUM CARBONATE 500 MG/1
500 TABLET, CHEWABLE ORAL DAILY
Status: DISCONTINUED | OUTPATIENT
Start: 2020-07-23 | End: 2020-08-03 | Stop reason: HOSPADM

## 2020-07-23 RX ADMIN — OXYCODONE HYDROCHLORIDE 10 MG: 10 TABLET ORAL at 20:25

## 2020-07-23 RX ADMIN — MORPHINE SULFATE 4 MG: 4 INJECTION INTRAVENOUS at 03:33

## 2020-07-23 RX ADMIN — HEPARIN SODIUM 5000 UNITS: 5000 INJECTION, SOLUTION INTRAVENOUS; SUBCUTANEOUS at 05:14

## 2020-07-23 RX ADMIN — GABAPENTIN 300 MG: 300 CAPSULE ORAL at 12:27

## 2020-07-23 RX ADMIN — NIMODIPINE 60 MG: 30 CAPSULE ORAL at 13:50

## 2020-07-23 RX ADMIN — NIMODIPINE 60 MG: 30 CAPSULE ORAL at 03:33

## 2020-07-23 RX ADMIN — GABAPENTIN 300 MG: 300 CAPSULE ORAL at 17:29

## 2020-07-23 RX ADMIN — MORPHINE SULFATE 4 MG: 4 INJECTION INTRAVENOUS at 18:32

## 2020-07-23 RX ADMIN — OXYCODONE HYDROCHLORIDE 10 MG: 10 TABLET ORAL at 08:01

## 2020-07-23 RX ADMIN — DEXAMETHASONE SODIUM PHOSPHATE 6 MG: 4 INJECTION, SOLUTION INTRA-ARTICULAR; INTRALESIONAL; INTRAMUSCULAR; INTRAVENOUS; SOFT TISSUE at 05:15

## 2020-07-23 RX ADMIN — LISINOPRIL 40 MG: 20 TABLET ORAL at 05:15

## 2020-07-23 RX ADMIN — MORPHINE SULFATE 4 MG: 4 INJECTION INTRAVENOUS at 09:09

## 2020-07-23 RX ADMIN — GABAPENTIN 300 MG: 300 CAPSULE ORAL at 05:15

## 2020-07-23 RX ADMIN — DEXAMETHASONE SODIUM PHOSPHATE 6 MG: 4 INJECTION, SOLUTION INTRA-ARTICULAR; INTRALESIONAL; INTRAMUSCULAR; INTRAVENOUS; SOFT TISSUE at 17:29

## 2020-07-23 RX ADMIN — NIMODIPINE 60 MG: 30 CAPSULE ORAL at 10:02

## 2020-07-23 RX ADMIN — NIMODIPINE 60 MG: 30 CAPSULE ORAL at 06:21

## 2020-07-23 RX ADMIN — NIMODIPINE 60 MG: 30 CAPSULE ORAL at 17:29

## 2020-07-23 RX ADMIN — MORPHINE SULFATE 4 MG: 4 INJECTION INTRAVENOUS at 21:57

## 2020-07-23 RX ADMIN — LEVETIRACETAM 500 MG: 500 TABLET ORAL at 17:29

## 2020-07-23 RX ADMIN — OXYCODONE HYDROCHLORIDE 10 MG: 10 TABLET ORAL at 03:33

## 2020-07-23 RX ADMIN — HEPARIN SODIUM 5000 UNITS: 5000 INJECTION, SOLUTION INTRAVENOUS; SUBCUTANEOUS at 21:57

## 2020-07-23 RX ADMIN — NIMODIPINE 60 MG: 30 CAPSULE ORAL at 21:57

## 2020-07-23 RX ADMIN — HEPARIN SODIUM 5000 UNITS: 5000 INJECTION, SOLUTION INTRAVENOUS; SUBCUTANEOUS at 13:50

## 2020-07-23 RX ADMIN — OXYCODONE HYDROCHLORIDE 10 MG: 10 TABLET ORAL at 12:27

## 2020-07-23 RX ADMIN — LIDOCAINE 1 PATCH: 50 PATCH TOPICAL at 13:50

## 2020-07-23 RX ADMIN — MORPHINE SULFATE 4 MG: 4 INJECTION INTRAVENOUS at 13:50

## 2020-07-23 RX ADMIN — AMLODIPINE BESYLATE 10 MG: 10 TABLET ORAL at 05:15

## 2020-07-23 RX ADMIN — LEVETIRACETAM 500 MG: 500 TABLET ORAL at 05:15

## 2020-07-23 ASSESSMENT — ENCOUNTER SYMPTOMS
PSYCHIATRIC NEGATIVE: 1
FOCAL WEAKNESS: 0
SHORTNESS OF BREATH: 0
FEVER: 0
NAUSEA: 0
HEADACHES: 1
ABDOMINAL PAIN: 0
EYES NEGATIVE: 1
MUSCULOSKELETAL NEGATIVE: 1
VOMITING: 0
CHILLS: 0

## 2020-07-23 NOTE — PROGRESS NOTES
Critical Care Progress Note    Date of admission  7/20/2020    Chief Complaint  40 y.o. female admitted 7/20/2020 with SAH from R PCOM aneurs    Hospital Course    7/20 admitted for SAH H&H 1, Amezquita grade 1, from ruptured PCOM aneurysm s/p coiling      Interval Problem Update  Reviewed last 24 hour events:  Keep sbp less than 180  Neurosurgery following  q 2 hour neuro checks  Regular diet  tcd am  Continue ICU for now  Heparin vte px  No ab  No cultures  Chem panel ok  Day 2/3 decadron    Review of Systems  Review of Systems   Constitutional: Negative for chills and fever.   HENT: Negative.    Eyes: Negative.    Respiratory: Negative for shortness of breath.    Cardiovascular: Negative for chest pain.   Gastrointestinal: Negative for abdominal pain, nausea and vomiting.   Genitourinary: Negative.    Musculoskeletal: Negative.    Skin: Negative.    Neurological: Positive for headaches. Negative for focal weakness.   Psychiatric/Behavioral: Negative.         Vital Signs for last 24 hours   Temp:  [36.7 °C (98 °F)-37.2 °C (98.9 °F)] 36.8 °C (98.2 °F)  Pulse:  [] 76  Resp:  [14-68] 22  BP: (110-171)/(60-94) 171/91  SpO2:  [92 %-100 %] 99 %    Hemodynamic parameters for last 24 hours       Respiratory Information for the last 24 hours       Physical Exam   Physical Exam  Constitutional:       Appearance: Normal appearance.   HENT:      Head: Normocephalic and atraumatic.      Right Ear: External ear normal.      Left Ear: External ear normal.      Nose: Nose normal.   Eyes:      Pupils: Pupils are equal, round, and reactive to light.   Cardiovascular:      Rate and Rhythm: Normal rate and regular rhythm.      Pulses: Normal pulses.   Pulmonary:      Effort: Pulmonary effort is normal.   Abdominal:      General: Abdomen is flat.   Neurological:      General: No focal deficit present.      Mental Status: She is alert and oriented to person, place, and time. Mental status is at baseline.      Cranial Nerves: No  cranial nerve deficit.      Motor: No weakness.      Comments: GCS 15. A&O x 4. Cn2-12 intact. Full strength throughout.   Psychiatric:         Mood and Affect: Mood normal.         Behavior: Behavior normal.         Medications  Current Facility-Administered Medications   Medication Dose Route Frequency Provider Last Rate Last Dose   • dexamethasone (DECADRON) injection 6 mg  6 mg Intravenous Q12HRS Cristiano Carlisle M.D.   6 mg at 07/23/20 0515   • heparin injection 5,000 Units  5,000 Units Subcutaneous Q8HRS Cristiano Carlisle M.D.   5,000 Units at 07/23/20 0514   • albuterol inhaler 2 Puff  2 Puff Inhalation Q4H PRN (RT) Norm Marie M.D.       • levETIRAcetam (KEPPRA) tablet 500 mg  500 mg Oral BID Cristiano Carlisle M.D.   500 mg at 07/23/20 0515   • amLODIPine (NORVASC) tablet 10 mg  10 mg Oral DAILY Cristiano Carlisle M.D.   10 mg at 07/23/20 0515   • lisinopril (PRINIVIL) tablet 40 mg  40 mg Oral DAILY Cristiano Carlisle M.D.   40 mg at 07/23/20 0515   • oxyCODONE immediate release (ROXICODONE) tablet 10 mg  10 mg Oral Q4HRS PRN Cristiano Carlisle M.D.   10 mg at 07/23/20 0333   • lidocaine (LIDODERM) 5 % 1 Patch  1 Patch Transdermal Q24HR Cristiano Carlisle M.D.   1 Patch at 07/22/20 1555   • gabapentin (NEURONTIN) capsule 300 mg  300 mg Oral TID Cristiano Carlisle M.D.   300 mg at 07/23/20 0515   • senna-docusate (PERICOLACE or SENOKOT S) 8.6-50 MG per tablet 2 Tab  2 Tab Oral BID Norm Marie M.D.   2 Tab at 07/22/20 1823    And   • polyethylene glycol/lytes (MIRALAX) PACKET 1 Packet  1 Packet Oral QDAY PRN Norm Marie M.D.        And   • magnesium hydroxide (MILK OF MAGNESIA) suspension 30 mL  30 mL Oral QDAY PRN Norm Marie M.D.        And   • bisacodyl (DULCOLAX) suppository 10 mg  10 mg Rectal QDAY PRN Norm Marie M.D.       • Respiratory Therapy Consult   Nebulization Continuous RT Norm Marie M.D.       • acetaminophen (TYLENOL) tablet 650 mg  650 mg Oral Q6HRS PRN Norm Marie M.D.        • labetalol (NORMODYNE/TRANDATE) injection 10 mg  10 mg Intravenous Q4HRS PRSHERI Mraie M.D.   10 mg at 07/21/20 0204   • ondansetron (ZOFRAN) syringe/vial injection 4 mg  4 mg Intravenous Q4HRS PRSHERI Marie M.D.       • ondansetron (ZOFRAN ODT) dispertab 4 mg  4 mg Oral Q4HRS PRSHERI Marie M.D.       • promethazine (PHENERGAN) tablet 12.5-25 mg  12.5-25 mg Oral Q4HRS SOILA Marie M.D.       • promethazine (PHENERGAN) suppository 12.5-25 mg  12.5-25 mg Rectal Q4HRS SOILA Marie M.D.       • prochlorperazine (COMPAZINE) injection 5-10 mg  5-10 mg Intravenous Q4HRS SOILA Marie M.D.       • morphine (pf) 4 MG/ML injection 4 mg  4 mg Intravenous Q2HRS PRSHERI Marie M.D.   4 mg at 07/23/20 0333   • niMODipine (NIMOTOP) capsule 60 mg  60 mg Oral Q4HRS Cristiano Carlisle M.D.   60 mg at 07/23/20 0621       Fluids    Intake/Output Summary (Last 24 hours) at 7/23/2020 0716  Last data filed at 7/23/2020 0600  Gross per 24 hour   Intake 1820 ml   Output 2500 ml   Net -680 ml       Laboratory          Recent Labs     07/20/20 1944 07/22/20  0807 07/23/20  0330   SODIUM 139 134* 135   POTASSIUM 3.5* 4.1 4.7   CHLORIDE 102 101 102   CO2 24 23 23   BUN 13 15 14   CREATININE 1.14 0.99 0.86   CALCIUM 9.3 8.6 8.8     Recent Labs     07/20/20 1944 07/22/20  0807 07/23/20  0330   ALTSGPT 9  --   --    ASTSGOT 7*  --   --    ALKPHOSPHAT 98  --   --    TBILIRUBIN 0.3  --   --    GLUCOSE 88 92 106*     Recent Labs     07/20/20 1944 07/22/20  0807   WBC 4.6* 8.0   NEUTSPOLYS 40.60*  --    LYMPHOCYTES 48.00*  --    MONOCYTES 7.00  --    EOSINOPHILS 3.30  --    BASOPHILS 0.90  --    ASTSGOT 7*  --    ALTSGPT 9  --    ALKPHOSPHAT 98  --    TBILIRUBIN 0.3  --      Recent Labs     07/20/20 1944 07/22/20  0807   RBC 4.74 4.01*   HEMOGLOBIN 13.1 11.1*   HEMATOCRIT 42.0 36.1*   PLATELETCT 164 165   PROTHROMBTM 13.6  --    APTT 30.5  --    INR 1.01  --        Imaging  CT:     Reviewed    Assessment/Plan  * SAH (subarachnoid hemorrhage) (HCC)  Assessment & Plan  H&H 1 Amezquita 1 SAH ruptured PCOM s/p coiling  SBP < 160  Neurochecks per protocol  Nimodipine for DCI prevention  Daily TCDs  Keppra PPX x 7 days  PT/OT/SLP    Ct head 7/22 stable, no changes        Headache  Assessment & Plan  2/2 to SAH  May be harder to control given her chronic pain  Analgesic and antiemetics prn  Gabapentin  300mg TID  Decadron 6mg BID for 48 hours   Positive drug tox    Pain, chronic  Assessment & Plan  Resume home regimen    HTN (hypertension)  Assessment & Plan  Will start to resume home regimen  Goal SBP < 160       VTE:  Not Indicated  Ulcer: Not Indicated  Lines: None    I have performed a physical exam and reviewed and updated ROS and Plan today (7/23/2020). In review of yesterday's note (7/22/2020), there are no changes except as documented above.     Discussed patient condition and risk of morbidity and/or mortality with RN, Pharmacy, Charge nurse / hot rounds, Patient and neurosurgery     Patient is critically ill. She has subarachnoid hemorrhage.  She requires q 2 hour neuro checks, high risk for vasospasms secondary to SAH.  If untreated there is a high chance of deterioration and eventually death. The critical that has been undertaken is medically complex. There has been no overlap in critical care time. Critical Care Time not including procedures: 32 minutes

## 2020-07-23 NOTE — CARE PLAN
Problem: Pain Management  Goal: Pain level will decrease to patient's comfort goal  Outcome: PROGRESSING AS EXPECTED     Problem: Psychosocial Needs:  Goal: Level of anxiety will decrease  Outcome: PROGRESSING AS EXPECTED     Problem: Communication  Goal: The ability to communicate needs accurately and effectively will improve  Outcome: PROGRESSING AS EXPECTED     Problem: Safety  Goal: Will remain free from injury  Outcome: PROGRESSING AS EXPECTED  Goal: Will remain free from falls  Outcome: PROGRESSING AS EXPECTED

## 2020-07-23 NOTE — PROGRESS NOTES
Patient seen and examined with DR. Womack. No neurological deficits appreciated. TCD's w/o evidence of vasospasm. CT scan head on 7/22/20 stable, no sign of hydrocephalus.    Nsx will sign off.  We will monitor the patient peripherally for signs of developing HCP and order follow up imaging as indicated.

## 2020-07-24 ENCOUNTER — APPOINTMENT (OUTPATIENT)
Dept: RADIOLOGY | Facility: MEDICAL CENTER | Age: 40
DRG: 021 | End: 2020-07-24
Attending: PSYCHIATRY & NEUROLOGY

## 2020-07-24 LAB
ANION GAP SERPL CALC-SCNC: 14 MMOL/L (ref 7–16)
BUN SERPL-MCNC: 18 MG/DL (ref 8–22)
CALCIUM SERPL-MCNC: 9.4 MG/DL (ref 8.5–10.5)
CHLORIDE SERPL-SCNC: 98 MMOL/L (ref 96–112)
CO2 SERPL-SCNC: 21 MMOL/L (ref 20–33)
CREAT SERPL-MCNC: 1.01 MG/DL (ref 0.5–1.4)
GLUCOSE SERPL-MCNC: 97 MG/DL (ref 65–99)
POTASSIUM SERPL-SCNC: 4.3 MMOL/L (ref 3.6–5.5)
SODIUM SERPL-SCNC: 133 MMOL/L (ref 135–145)

## 2020-07-24 PROCEDURE — 700102 HCHG RX REV CODE 250 W/ 637 OVERRIDE(OP): Performed by: INTERNAL MEDICINE

## 2020-07-24 PROCEDURE — 93888 INTRACRANIAL LIMITED STUDY: CPT | Mod: 26 | Performed by: INTERNAL MEDICINE

## 2020-07-24 PROCEDURE — 700102 HCHG RX REV CODE 250 W/ 637 OVERRIDE(OP): Performed by: PSYCHIATRY & NEUROLOGY

## 2020-07-24 PROCEDURE — 770022 HCHG ROOM/CARE - ICU (200)

## 2020-07-24 PROCEDURE — 700111 HCHG RX REV CODE 636 W/ 250 OVERRIDE (IP): Performed by: INTERNAL MEDICINE

## 2020-07-24 PROCEDURE — 99232 SBSQ HOSP IP/OBS MODERATE 35: CPT | Performed by: INTERNAL MEDICINE

## 2020-07-24 PROCEDURE — 93888 INTRACRANIAL LIMITED STUDY: CPT

## 2020-07-24 PROCEDURE — 700101 HCHG RX REV CODE 250: Performed by: PSYCHIATRY & NEUROLOGY

## 2020-07-24 PROCEDURE — A9270 NON-COVERED ITEM OR SERVICE: HCPCS | Performed by: PSYCHIATRY & NEUROLOGY

## 2020-07-24 PROCEDURE — A9270 NON-COVERED ITEM OR SERVICE: HCPCS | Performed by: INTERNAL MEDICINE

## 2020-07-24 PROCEDURE — 700111 HCHG RX REV CODE 636 W/ 250 OVERRIDE (IP): Performed by: PSYCHIATRY & NEUROLOGY

## 2020-07-24 PROCEDURE — 80048 BASIC METABOLIC PNL TOTAL CA: CPT

## 2020-07-24 RX ORDER — SODIUM CHLORIDE 1 G/1
1 TABLET ORAL
Status: DISCONTINUED | OUTPATIENT
Start: 2020-07-24 | End: 2020-08-03 | Stop reason: HOSPADM

## 2020-07-24 RX ADMIN — NIMODIPINE 60 MG: 30 CAPSULE ORAL at 05:59

## 2020-07-24 RX ADMIN — MORPHINE SULFATE 4 MG: 4 INJECTION INTRAVENOUS at 14:17

## 2020-07-24 RX ADMIN — OXYCODONE HYDROCHLORIDE 10 MG: 10 TABLET ORAL at 17:06

## 2020-07-24 RX ADMIN — LEVETIRACETAM 500 MG: 500 TABLET ORAL at 04:50

## 2020-07-24 RX ADMIN — GABAPENTIN 300 MG: 300 CAPSULE ORAL at 17:06

## 2020-07-24 RX ADMIN — LISINOPRIL 40 MG: 20 TABLET ORAL at 04:49

## 2020-07-24 RX ADMIN — GABAPENTIN 300 MG: 300 CAPSULE ORAL at 11:30

## 2020-07-24 RX ADMIN — NIMODIPINE 60 MG: 30 CAPSULE ORAL at 21:53

## 2020-07-24 RX ADMIN — LIDOCAINE 1 PATCH: 50 PATCH TOPICAL at 14:09

## 2020-07-24 RX ADMIN — ANTACID TABLETS 500 MG: 500 TABLET, CHEWABLE ORAL at 00:24

## 2020-07-24 RX ADMIN — LEVETIRACETAM 500 MG: 500 TABLET ORAL at 17:06

## 2020-07-24 RX ADMIN — HEPARIN SODIUM 5000 UNITS: 5000 INJECTION, SOLUTION INTRAVENOUS; SUBCUTANEOUS at 21:53

## 2020-07-24 RX ADMIN — HEPARIN SODIUM 5000 UNITS: 5000 INJECTION, SOLUTION INTRAVENOUS; SUBCUTANEOUS at 14:09

## 2020-07-24 RX ADMIN — MORPHINE SULFATE 4 MG: 4 INJECTION INTRAVENOUS at 09:59

## 2020-07-24 RX ADMIN — ACETAMINOPHEN 650 MG: 325 TABLET, FILM COATED ORAL at 07:54

## 2020-07-24 RX ADMIN — NIMODIPINE 60 MG: 30 CAPSULE ORAL at 09:58

## 2020-07-24 RX ADMIN — Medication 1 G: at 18:31

## 2020-07-24 RX ADMIN — MORPHINE SULFATE 4 MG: 4 INJECTION INTRAVENOUS at 06:33

## 2020-07-24 RX ADMIN — HEPARIN SODIUM 5000 UNITS: 5000 INJECTION, SOLUTION INTRAVENOUS; SUBCUTANEOUS at 05:59

## 2020-07-24 RX ADMIN — OXYCODONE HYDROCHLORIDE 10 MG: 10 TABLET ORAL at 00:25

## 2020-07-24 RX ADMIN — ACETAMINOPHEN 650 MG: 325 TABLET, FILM COATED ORAL at 14:09

## 2020-07-24 RX ADMIN — GABAPENTIN 300 MG: 300 CAPSULE ORAL at 04:49

## 2020-07-24 RX ADMIN — OXYCODONE HYDROCHLORIDE 10 MG: 10 TABLET ORAL at 04:49

## 2020-07-24 RX ADMIN — MORPHINE SULFATE 4 MG: 4 INJECTION INTRAVENOUS at 23:35

## 2020-07-24 RX ADMIN — NIMODIPINE 60 MG: 30 CAPSULE ORAL at 17:06

## 2020-07-24 RX ADMIN — NIMODIPINE 60 MG: 30 CAPSULE ORAL at 02:01

## 2020-07-24 RX ADMIN — OXYCODONE HYDROCHLORIDE 10 MG: 10 TABLET ORAL at 11:30

## 2020-07-24 RX ADMIN — ACETAMINOPHEN 650 MG: 325 TABLET, FILM COATED ORAL at 21:55

## 2020-07-24 RX ADMIN — OXYCODONE HYDROCHLORIDE 10 MG: 10 TABLET ORAL at 21:53

## 2020-07-24 RX ADMIN — NIMODIPINE 60 MG: 30 CAPSULE ORAL at 14:09

## 2020-07-24 RX ADMIN — AMLODIPINE BESYLATE 10 MG: 10 TABLET ORAL at 04:49

## 2020-07-24 ASSESSMENT — ENCOUNTER SYMPTOMS
NAUSEA: 0
VOMITING: 0
FEVER: 0
CHILLS: 0
ABDOMINAL PAIN: 0
SHORTNESS OF BREATH: 0
EYES NEGATIVE: 1
NECK PAIN: 1
HEADACHES: 1
FOCAL WEAKNESS: 0
PSYCHIATRIC NEGATIVE: 1

## 2020-07-24 NOTE — PROGRESS NOTES
Critical Care Progress Note    Date of admission  7/20/2020    Chief Complaint  40 y.o. female admitted 7/20/2020 with SAH from R PCOM aneurs    Hospital Course    7/20 admitted for SAH H&H 1, Amezquita grade 1, from ruptured PCOM aneurysm s/p coiling      Interval Problem Update  Reviewed last 24 hour events:  Subarachnoid  Alert and oriented  Mobilizing  Oxy and morphine and tylenol adequate pain relief  q 4 hour neuro checks  Continue ICU care due to high risk for vasospasms  Bmp adequate, sodium 133, goal normal  Salt tabs started  Continue amlodipine and lisinopril  tcd negative      Review of Systems  Review of Systems   Constitutional: Negative for chills and fever.   HENT: Negative.    Eyes: Negative.    Respiratory: Negative for shortness of breath.    Cardiovascular: Negative for chest pain.   Gastrointestinal: Negative for abdominal pain, nausea and vomiting.   Genitourinary: Negative.    Musculoskeletal: Positive for neck pain.   Skin: Negative.    Neurological: Positive for headaches. Negative for focal weakness.        Once overnight, resolved   Psychiatric/Behavioral: Negative.         Vital Signs for last 24 hours   Temp:  [36 °C (96.8 °F)-36.9 °C (98.4 °F)] 36.8 °C (98.3 °F)  Pulse:  [] 132  Resp:  [14-80] 80  BP: (121-162)/(64-91) 130/74  SpO2:  [79 %-100 %] 99 %    Hemodynamic parameters for last 24 hours       Respiratory Information for the last 24 hours       Physical Exam   Physical Exam  Vitals signs and nursing note reviewed.   Constitutional:       General: She is not in acute distress.     Appearance: Normal appearance. She is not ill-appearing, toxic-appearing or diaphoretic.   HENT:      Head: Normocephalic and atraumatic.      Right Ear: External ear normal.      Left Ear: External ear normal.      Nose: No congestion or rhinorrhea.      Mouth/Throat:      Mouth: Mucous membranes are moist.      Pharynx: No oropharyngeal exudate or posterior oropharyngeal erythema.   Eyes:       General: No scleral icterus.     Extraocular Movements: Extraocular movements intact.      Conjunctiva/sclera: Conjunctivae normal.      Pupils: Pupils are equal, round, and reactive to light.   Neck:      Musculoskeletal: Neck supple. No neck rigidity or muscular tenderness.   Cardiovascular:      Rate and Rhythm: Normal rate and regular rhythm.      Pulses: Normal pulses.      Heart sounds: Normal heart sounds. No murmur.   Pulmonary:      Effort: No respiratory distress.      Breath sounds: No wheezing.   Abdominal:      General: There is no distension.      Palpations: There is no mass.      Tenderness: There is no abdominal tenderness. There is no guarding.   Musculoskeletal:         General: No swelling or tenderness.      Right lower leg: No edema.      Left lower leg: No edema.   Lymphadenopathy:      Cervical: No cervical adenopathy.   Skin:     Coloration: Skin is not jaundiced or pale.      Findings: No bruising, erythema, lesion or rash.   Neurological:      General: No focal deficit present.      Mental Status: She is alert and oriented to person, place, and time.      Cranial Nerves: No cranial nerve deficit.      Sensory: No sensory deficit.      Motor: No weakness.      Coordination: Coordination normal.      Deep Tendon Reflexes: Reflexes normal.   Psychiatric:         Mood and Affect: Mood normal.         Behavior: Behavior normal.         Medications  Current Facility-Administered Medications   Medication Dose Route Frequency Provider Last Rate Last Dose   • calcium carbonate (TUMS) chewable tab 500 mg  500 mg Oral DAILY Norm Marie M.D.   500 mg at 07/24/20 0024   • heparin injection 5,000 Units  5,000 Units Subcutaneous Q8HRS Cristiano Carlisle M.D.   5,000 Units at 07/24/20 0564   • albuterol inhaler 2 Puff  2 Puff Inhalation Q4H PRN (RT) Norm Marie M.D.       • levETIRAcetam (KEPPRA) tablet 500 mg  500 mg Oral BID Cristiano Carlisle M.D.   500 mg at 07/24/20 0450   • amLODIPine (NORVASC)  tablet 10 mg  10 mg Oral DAILY Cristiano Carlisle M.D.   10 mg at 07/24/20 0449   • lisinopril (PRINIVIL) tablet 40 mg  40 mg Oral DAILY Cristiano Carlisle M.D.   40 mg at 07/24/20 0449   • oxyCODONE immediate release (ROXICODONE) tablet 10 mg  10 mg Oral Q4HRS PRN Cristiano Carlisle M.D.   10 mg at 07/24/20 0449   • lidocaine (LIDODERM) 5 % 1 Patch  1 Patch Transdermal Q24HR Cristiano Carlisle M.D.   1 Patch at 07/23/20 1350   • gabapentin (NEURONTIN) capsule 300 mg  300 mg Oral TID Cristiano Carlisle M.D.   300 mg at 07/24/20 0449   • senna-docusate (PERICOLACE or SENOKOT S) 8.6-50 MG per tablet 2 Tab  2 Tab Oral BID Norm Marie M.D.   2 Tab at 07/22/20 1823    And   • polyethylene glycol/lytes (MIRALAX) PACKET 1 Packet  1 Packet Oral QDAY PRN Norm Marie M.D.        And   • magnesium hydroxide (MILK OF MAGNESIA) suspension 30 mL  30 mL Oral QDAY PRN Norm Marie M.D.        And   • bisacodyl (DULCOLAX) suppository 10 mg  10 mg Rectal QDAY PRN Norm Marie M.D.       • Respiratory Therapy Consult   Nebulization Continuous RT Norm Marie M.D.       • acetaminophen (TYLENOL) tablet 650 mg  650 mg Oral Q6HRS PRN Norm Marie M.D.   650 mg at 07/24/20 0754   • labetalol (NORMODYNE/TRANDATE) injection 10 mg  10 mg Intravenous Q4HRS PRSHERI Marie M.D.   10 mg at 07/21/20 0204   • ondansetron (ZOFRAN) syringe/vial injection 4 mg  4 mg Intravenous Q4HRS PRSHERI Marie M.D.       • ondansetron (ZOFRAN ODT) dispertab 4 mg  4 mg Oral Q4HRS PRN Norm Marie M.D.       • promethazine (PHENERGAN) tablet 12.5-25 mg  12.5-25 mg Oral Q4HRS PRSHERI Marie M.D.       • promethazine (PHENERGAN) suppository 12.5-25 mg  12.5-25 mg Rectal Q4HRS PRSHERI Marie M.D.       • prochlorperazine (COMPAZINE) injection 5-10 mg  5-10 mg Intravenous Q4HRS PRSHERI Marie M.D.       • morphine (pf) 4 MG/ML injection 4 mg  4 mg Intravenous Q2HRS PRSHERI Marie M.D.   4 mg at 07/24/20 0633   •  niMODipine (NIMOTOP) capsule 60 mg  60 mg Oral Q4HRS Cristiano Carlisle M.D.   60 mg at 07/24/20 0559       Fluids    Intake/Output Summary (Last 24 hours) at 7/24/2020 0847  Last data filed at 7/24/2020 0800  Gross per 24 hour   Intake 450 ml   Output 2200 ml   Net -1750 ml       Laboratory          Recent Labs     07/22/20  0807 07/23/20  0330 07/24/20  0700   SODIUM 134* 135 133*   POTASSIUM 4.1 4.7 4.3   CHLORIDE 101 102 98   CO2 23 23 21   BUN 15 14 18   CREATININE 0.99 0.86 1.01   CALCIUM 8.6 8.8 9.4     Recent Labs     07/22/20  0807 07/23/20  0330 07/24/20  0700   GLUCOSE 92 106* 97     Recent Labs     07/22/20  0807   WBC 8.0     Recent Labs     07/22/20  0807   RBC 4.01*   HEMOGLOBIN 11.1*   HEMATOCRIT 36.1*   PLATELETCT 165       Imaging  CT:    Reviewed  Echo:   Reviewed  Ultrasound:  Reviewed    Assessment/Plan  * SAH (subarachnoid hemorrhage) (HCC)  Assessment & Plan  H&H 1 Amezquita 1 SAH ruptured PCOM s/p coiling  SBP < 160  Neurochecks every 4 hours  Nimodipine for DCI prevention  Daily TCDs, no vasospasms  Continue adequate bp controle  Continue ICU due to high risk vasospasms  Keppra PPX x 7 days  PT/OT/SLP  Ct head 7/22 stable, no changes        Headache  Assessment & Plan  2/2 to SAH  May be harder to control given her chronic pain  Analgesic and antiemetics prn  Gabapentin    Received decadron  Tylenol/morphine/oxycodone prn  Positive drug tox, avoid long term use    Pain, chronic  Assessment & Plan  Morphine prn  Tylenol prn  Oxycodone prn  Adequate control currently  Avoid long term use due to positive drug screen multiple drugs including cocaine/opioids  Patient denies drug use    HTN (hypertension)  Assessment & Plan  Amlodipine and lisinopril  Prn medications  Goal SBP < 160       VTE:  Not Indicated  Ulcer: Not Indicated  Lines: None    I have performed a physical exam and reviewed and updated ROS and Plan today (7/24/2020). In review of yesterday's note (7/23/2020), there are no changes except  as documented above.     Discussed patient condition and risk of morbidity and/or mortality with RN, RT, Therapies, Pharmacy, Code status disscussed, Charge nurse / hot rounds, Patient and neurosurgery

## 2020-07-24 NOTE — PROGRESS NOTES
"Nursing supervisor went down to ER entrance to speak with patient's mother who wanted to come visit patient. RN explained that each patient was only allowed one patient support person per admission due to the covid precautions. Mother upset and angry repeating \"I drove all the way from [place] and can't see my daughter?\" RN apologized and explained again only one person is allowed per hospitalization to minimize the risk of infection for patient's and staff. Mother asked who that person was and RN told her it was the patient's sister that had been visiting the patient. Mother had the patient on her phone with face time while RN was speaking with her and patient became upset and said it was never made clear to her about the visiting policy and she hadn't designated her sister. Patient's mother said the patient's  had been the first one to visit the patient and he should be the visitor then. The patient agreed to this and the RN apologize for the confusion and confirmed with the patient that she wanted the  to be her support person. She said she did want him. The RN reiterated that from now on until the patient discharges the only person that will be allowed to visit is the . The mother replied \"Yes, just go and update that now so he can see her because he is driving in from [place] and you have all your f*cking stupid rules.\"     The patient's chart was updated to reflect the patient's  as her support person.  "

## 2020-07-25 ENCOUNTER — APPOINTMENT (OUTPATIENT)
Dept: RADIOLOGY | Facility: MEDICAL CENTER | Age: 40
DRG: 021 | End: 2020-07-25
Attending: PSYCHIATRY & NEUROLOGY

## 2020-07-25 PROBLEM — E87.5 HYPERKALEMIA: Status: ACTIVE | Noted: 2020-07-25

## 2020-07-25 LAB
ANION GAP SERPL CALC-SCNC: 11 MMOL/L (ref 7–16)
BUN SERPL-MCNC: 17 MG/DL (ref 8–22)
CALCIUM SERPL-MCNC: 9.4 MG/DL (ref 8.5–10.5)
CHLORIDE SERPL-SCNC: 103 MMOL/L (ref 96–112)
CO2 SERPL-SCNC: 27 MMOL/L (ref 20–33)
CREAT SERPL-MCNC: 1.28 MG/DL (ref 0.5–1.4)
GLUCOSE SERPL-MCNC: 88 MG/DL (ref 65–99)
POTASSIUM SERPL-SCNC: 5.6 MMOL/L (ref 3.6–5.5)
POTASSIUM SERPL-SCNC: 5.9 MMOL/L (ref 3.6–5.5)
SODIUM SERPL-SCNC: 141 MMOL/L (ref 135–145)

## 2020-07-25 PROCEDURE — A9270 NON-COVERED ITEM OR SERVICE: HCPCS | Performed by: PSYCHIATRY & NEUROLOGY

## 2020-07-25 PROCEDURE — 99233 SBSQ HOSP IP/OBS HIGH 50: CPT | Performed by: INTERNAL MEDICINE

## 2020-07-25 PROCEDURE — A9270 NON-COVERED ITEM OR SERVICE: HCPCS | Performed by: INTERNAL MEDICINE

## 2020-07-25 PROCEDURE — 700111 HCHG RX REV CODE 636 W/ 250 OVERRIDE (IP): Performed by: INTERNAL MEDICINE

## 2020-07-25 PROCEDURE — 93888 INTRACRANIAL LIMITED STUDY: CPT | Mod: 26 | Performed by: INTERNAL MEDICINE

## 2020-07-25 PROCEDURE — 84132 ASSAY OF SERUM POTASSIUM: CPT

## 2020-07-25 PROCEDURE — 93888 INTRACRANIAL LIMITED STUDY: CPT

## 2020-07-25 PROCEDURE — 770022 HCHG ROOM/CARE - ICU (200)

## 2020-07-25 PROCEDURE — 700102 HCHG RX REV CODE 250 W/ 637 OVERRIDE(OP): Performed by: INTERNAL MEDICINE

## 2020-07-25 PROCEDURE — 700102 HCHG RX REV CODE 250 W/ 637 OVERRIDE(OP): Performed by: PSYCHIATRY & NEUROLOGY

## 2020-07-25 PROCEDURE — 80048 BASIC METABOLIC PNL TOTAL CA: CPT

## 2020-07-25 PROCEDURE — 700111 HCHG RX REV CODE 636 W/ 250 OVERRIDE (IP): Performed by: PSYCHIATRY & NEUROLOGY

## 2020-07-25 PROCEDURE — 700111 HCHG RX REV CODE 636 W/ 250 OVERRIDE (IP): Performed by: NEUROLOGICAL SURGERY

## 2020-07-25 RX ORDER — DEXAMETHASONE SODIUM PHOSPHATE 4 MG/ML
4 INJECTION, SOLUTION INTRA-ARTICULAR; INTRALESIONAL; INTRAMUSCULAR; INTRAVENOUS; SOFT TISSUE ONCE
Status: COMPLETED | OUTPATIENT
Start: 2020-07-25 | End: 2020-07-25

## 2020-07-25 RX ORDER — KETOROLAC TROMETHAMINE 30 MG/ML
30 INJECTION, SOLUTION INTRAMUSCULAR; INTRAVENOUS ONCE
Status: DISCONTINUED | OUTPATIENT
Start: 2020-07-25 | End: 2020-07-25

## 2020-07-25 RX ORDER — VALPROATE SODIUM 100 MG/ML
500 INJECTION INTRAVENOUS ONCE
Status: COMPLETED | OUTPATIENT
Start: 2020-07-25 | End: 2020-07-25

## 2020-07-25 RX ORDER — HYDRALAZINE HYDROCHLORIDE 25 MG/1
25 TABLET, FILM COATED ORAL EVERY 8 HOURS
Status: DISCONTINUED | OUTPATIENT
Start: 2020-07-26 | End: 2020-08-03 | Stop reason: HOSPADM

## 2020-07-25 RX ADMIN — MORPHINE SULFATE 4 MG: 4 INJECTION INTRAVENOUS at 23:05

## 2020-07-25 RX ADMIN — OXYCODONE HYDROCHLORIDE 10 MG: 10 TABLET ORAL at 11:57

## 2020-07-25 RX ADMIN — ACETAMINOPHEN 650 MG: 325 TABLET, FILM COATED ORAL at 04:27

## 2020-07-25 RX ADMIN — HEPARIN SODIUM 5000 UNITS: 5000 INJECTION, SOLUTION INTRAVENOUS; SUBCUTANEOUS at 13:04

## 2020-07-25 RX ADMIN — Medication 1 G: at 17:51

## 2020-07-25 RX ADMIN — ACETAMINOPHEN 650 MG: 325 TABLET, FILM COATED ORAL at 13:03

## 2020-07-25 RX ADMIN — GABAPENTIN 300 MG: 300 CAPSULE ORAL at 11:58

## 2020-07-25 RX ADMIN — LEVETIRACETAM 500 MG: 500 TABLET ORAL at 17:51

## 2020-07-25 RX ADMIN — NIMODIPINE 60 MG: 30 CAPSULE ORAL at 13:03

## 2020-07-25 RX ADMIN — NIMODIPINE 60 MG: 30 CAPSULE ORAL at 20:31

## 2020-07-25 RX ADMIN — NIMODIPINE 60 MG: 30 CAPSULE ORAL at 09:47

## 2020-07-25 RX ADMIN — OXYCODONE HYDROCHLORIDE 10 MG: 10 TABLET ORAL at 23:05

## 2020-07-25 RX ADMIN — NIMODIPINE 60 MG: 30 CAPSULE ORAL at 01:34

## 2020-07-25 RX ADMIN — VALPROATE SODIUM 500 MG: 100 INJECTION, SOLUTION INTRAVENOUS at 20:31

## 2020-07-25 RX ADMIN — OXYCODONE HYDROCHLORIDE 10 MG: 10 TABLET ORAL at 07:52

## 2020-07-25 RX ADMIN — DEXAMETHASONE SODIUM PHOSPHATE 4 MG: 4 INJECTION, SOLUTION INTRA-ARTICULAR; INTRALESIONAL; INTRAMUSCULAR; INTRAVENOUS; SOFT TISSUE at 16:21

## 2020-07-25 RX ADMIN — OXYCODONE HYDROCHLORIDE 10 MG: 10 TABLET ORAL at 16:22

## 2020-07-25 RX ADMIN — MORPHINE SULFATE 4 MG: 4 INJECTION INTRAVENOUS at 09:47

## 2020-07-25 RX ADMIN — MORPHINE SULFATE 4 MG: 4 INJECTION INTRAVENOUS at 16:22

## 2020-07-25 RX ADMIN — Medication 1 G: at 06:37

## 2020-07-25 RX ADMIN — AMLODIPINE BESYLATE 10 MG: 10 TABLET ORAL at 05:00

## 2020-07-25 RX ADMIN — NIMODIPINE 60 MG: 30 CAPSULE ORAL at 06:36

## 2020-07-25 RX ADMIN — DOCUSATE SODIUM 50 MG AND SENNOSIDES 8.6 MG 2 TABLET: 8.6; 5 TABLET, FILM COATED ORAL at 17:51

## 2020-07-25 RX ADMIN — GABAPENTIN 300 MG: 300 CAPSULE ORAL at 05:00

## 2020-07-25 RX ADMIN — OXYCODONE HYDROCHLORIDE 10 MG: 10 TABLET ORAL at 01:34

## 2020-07-25 RX ADMIN — MORPHINE SULFATE 4 MG: 4 INJECTION INTRAVENOUS at 11:58

## 2020-07-25 RX ADMIN — Medication 1 G: at 09:52

## 2020-07-25 RX ADMIN — MORPHINE SULFATE 4 MG: 4 INJECTION INTRAVENOUS at 07:51

## 2020-07-25 RX ADMIN — HEPARIN SODIUM 5000 UNITS: 5000 INJECTION, SOLUTION INTRAVENOUS; SUBCUTANEOUS at 20:31

## 2020-07-25 RX ADMIN — NIMODIPINE 60 MG: 30 CAPSULE ORAL at 17:51

## 2020-07-25 RX ADMIN — HEPARIN SODIUM 5000 UNITS: 5000 INJECTION, SOLUTION INTRAVENOUS; SUBCUTANEOUS at 04:30

## 2020-07-25 RX ADMIN — LISINOPRIL 40 MG: 20 TABLET ORAL at 04:28

## 2020-07-25 RX ADMIN — GABAPENTIN 300 MG: 300 CAPSULE ORAL at 17:51

## 2020-07-25 RX ADMIN — LEVETIRACETAM 500 MG: 500 TABLET ORAL at 05:00

## 2020-07-25 ASSESSMENT — ENCOUNTER SYMPTOMS
SHORTNESS OF BREATH: 0
ABDOMINAL PAIN: 0
HEADACHES: 1
VOMITING: 0
NECK PAIN: 1
PSYCHIATRIC NEGATIVE: 1
EYES NEGATIVE: 1
FEVER: 0
CHILLS: 0
NAUSEA: 0
FOCAL WEAKNESS: 0

## 2020-07-25 ASSESSMENT — FIBROSIS 4 INDEX: FIB4 SCORE: 0.58

## 2020-07-25 NOTE — CARE PLAN
Problem: Pain Management  Goal: Pain level will decrease to patient's comfort goal  Outcome: PROGRESSING AS EXPECTED  Note: Patient has required regular dosing of pain medications.  They have been sufficient in controlling her pain      Problem: Psychosocial Needs:  Goal: Level of anxiety will decrease  Outcome: PROGRESSING AS EXPECTED     Problem: Communication  Goal: The ability to communicate needs accurately and effectively will improve  Outcome: PROGRESSING AS EXPECTED     Problem: Bowel/Gastric:  Goal: Normal bowel function is maintained or improved  Outcome: PROGRESSING AS EXPECTED     Problem: Skin Integrity  Goal: Risk for impaired skin integrity will decrease  Outcome: PROGRESSING AS EXPECTED

## 2020-07-25 NOTE — PROGRESS NOTES
Critical Care Progress Note    Date of admission  7/20/2020    Chief Complaint  40 y.o. female admitted 7/20/2020 with SAH from R PCOM aneurs    Hospital Course    7/20 admitted for SAH H&H 1, Amezquita grade 1, from ruptured PCOM aneurysm s/p coiling      Interval Problem Update  Reviewed last 24 hour events:  tcd negative  Ongoing headache when awakens  No changes in neuro status  On morphine and oxycodone  Allergy to toradol, swelling  She said dilaudid has worked in past  May consider dilaudid if morphine not working  Resting comfortably most the day and seems adequately controlled.  K 5.9, received lisinopril am, d/c for now  Hydralazine scheduled for tomorrow  Will restart lisinopril with improved potassium    Review of Systems  Review of Systems   Constitutional: Negative for chills and fever.   HENT: Negative.    Eyes: Negative.    Respiratory: Negative for shortness of breath.    Cardiovascular: Negative for chest pain.   Gastrointestinal: Negative for abdominal pain, nausea and vomiting.   Genitourinary: Negative.    Musculoskeletal: Positive for neck pain.   Skin: Negative.    Neurological: Positive for headaches. Negative for focal weakness.        Once overnight, resolved   Psychiatric/Behavioral: Negative.         Vital Signs for last 24 hours   Temp:  [36 °C (96.8 °F)-36.8 °C (98.3 °F)] 36.2 °C (97.2 °F)  Pulse:  [] 66  Resp:  [14-80] 16  BP: (107-178)/(63-91) 114/70  SpO2:  [28 %-100 %] 99 %    Hemodynamic parameters for last 24 hours       Respiratory Information for the last 24 hours       Physical Exam   Physical Exam  Vitals signs and nursing note reviewed.   Constitutional:       General: She is not in acute distress.     Appearance: Normal appearance. She is not ill-appearing, toxic-appearing or diaphoretic.   HENT:      Head: Normocephalic and atraumatic.      Right Ear: External ear normal.      Left Ear: External ear normal.      Nose: No congestion or rhinorrhea.      Mouth/Throat:       Mouth: Mucous membranes are moist.      Pharynx: No oropharyngeal exudate or posterior oropharyngeal erythema.   Eyes:      General: No scleral icterus.     Extraocular Movements: Extraocular movements intact.      Conjunctiva/sclera: Conjunctivae normal.      Pupils: Pupils are equal, round, and reactive to light.   Neck:      Musculoskeletal: Neck supple. No neck rigidity or muscular tenderness.   Cardiovascular:      Rate and Rhythm: Normal rate and regular rhythm.      Pulses: Normal pulses.      Heart sounds: Normal heart sounds. No murmur.   Pulmonary:      Effort: No respiratory distress.      Breath sounds: No wheezing.   Abdominal:      General: There is no distension.      Palpations: There is no mass.      Tenderness: There is no abdominal tenderness. There is no guarding.   Musculoskeletal:         General: No swelling or tenderness.      Right lower leg: No edema.      Left lower leg: No edema.   Lymphadenopathy:      Cervical: No cervical adenopathy.   Skin:     Coloration: Skin is not jaundiced or pale.      Findings: No bruising, erythema, lesion or rash.   Neurological:      General: No focal deficit present.      Mental Status: She is alert and oriented to person, place, and time.      Cranial Nerves: No cranial nerve deficit.      Sensory: No sensory deficit.      Motor: No weakness.      Coordination: Coordination normal.      Deep Tendon Reflexes: Reflexes normal.   Psychiatric:         Mood and Affect: Mood normal.         Behavior: Behavior normal.         Medications  Current Facility-Administered Medications   Medication Dose Route Frequency Provider Last Rate Last Dose   • sodium chloride (SALT) tablet 1 g  1 g Oral TID WITH MEALS Mingo Velasquez M.D.   1 g at 07/25/20 0637   • calcium carbonate (TUMS) chewable tab 500 mg  500 mg Oral DAILY Norm Marie M.D.   500 mg at 07/24/20 0024   • heparin injection 5,000 Units  5,000 Units Subcutaneous Q8HRS Cristiano Carlisle M.D.   5,000 Units at  07/25/20 0430   • albuterol inhaler 2 Puff  2 Puff Inhalation Q4H PRN (RT) Norm Marie M.D.       • levETIRAcetam (KEPPRA) tablet 500 mg  500 mg Oral BID Cristiano Carlisle M.D.   500 mg at 07/25/20 0500   • amLODIPine (NORVASC) tablet 10 mg  10 mg Oral DAILY Cristiano Carlisle M.D.   10 mg at 07/25/20 0500   • lisinopril (PRINIVIL) tablet 40 mg  40 mg Oral DAILY Cristiano Carlisle M.D.   40 mg at 07/25/20 0428   • oxyCODONE immediate release (ROXICODONE) tablet 10 mg  10 mg Oral Q4HRS PRN Cristiano Carlisle M.D.   10 mg at 07/25/20 0134   • lidocaine (LIDODERM) 5 % 1 Patch  1 Patch Transdermal Q24HR Cristiano Carlisle M.D.   1 Patch at 07/24/20 1409   • gabapentin (NEURONTIN) capsule 300 mg  300 mg Oral TID Cristiano Carlisle M.D.   300 mg at 07/25/20 0500   • senna-docusate (PERICOLACE or SENOKOT S) 8.6-50 MG per tablet 2 Tab  2 Tab Oral BID Norm Marie M.D.   2 Tab at 07/22/20 1823    And   • polyethylene glycol/lytes (MIRALAX) PACKET 1 Packet  1 Packet Oral QDAY PRN Norm Marie M.D.        And   • magnesium hydroxide (MILK OF MAGNESIA) suspension 30 mL  30 mL Oral QDAY PRN Norm Marie M.D.        And   • bisacodyl (DULCOLAX) suppository 10 mg  10 mg Rectal QDAY PRN Norm Marie M.D.       • Respiratory Therapy Consult   Nebulization Continuous RT Norm Marie M.D.       • acetaminophen (TYLENOL) tablet 650 mg  650 mg Oral Q6HRS PRN Norm Marie M.D.   650 mg at 07/25/20 0427   • labetalol (NORMODYNE/TRANDATE) injection 10 mg  10 mg Intravenous Q4HRS PRN Norm Marie M.D.   10 mg at 07/21/20 0204   • ondansetron (ZOFRAN) syringe/vial injection 4 mg  4 mg Intravenous Q4HRS PRSHERI Marie M.D.       • ondansetron (ZOFRAN ODT) dispertab 4 mg  4 mg Oral Q4HRS PRSHERI Marie M.D.       • promethazine (PHENERGAN) tablet 12.5-25 mg  12.5-25 mg Oral Q4HRS PRSHERI Marie M.D.       • promethazine (PHENERGAN) suppository 12.5-25 mg  12.5-25 mg Rectal Q4HRS PRSHERI Marie M.D.        • prochlorperazine (COMPAZINE) injection 5-10 mg  5-10 mg Intravenous Q4HRS PRN Norm Marie M.D.       • morphine (pf) 4 MG/ML injection 4 mg  4 mg Intravenous Q2HRS PRN Norm Marie M.D.   4 mg at 07/24/20 2335   • niMODipine (NIMOTOP) capsule 60 mg  60 mg Oral Q4HRS Cristiano Carlisle M.D.   60 mg at 07/25/20 0636       Fluids    Intake/Output Summary (Last 24 hours) at 7/25/2020 0712  Last data filed at 7/25/2020 0400  Gross per 24 hour   Intake 1350 ml   Output 2550 ml   Net -1200 ml       Laboratory          Recent Labs     07/23/20  0330 07/24/20  0700 07/25/20  0436   SODIUM 135 133* 141   POTASSIUM 4.7 4.3 5.9*   CHLORIDE 102 98 103   CO2 23 21 27   BUN 14 18 17   CREATININE 0.86 1.01 1.28   CALCIUM 8.8 9.4 9.4     Recent Labs     07/23/20  0330 07/24/20  0700 07/25/20  0436   GLUCOSE 106* 97 88     Recent Labs     07/22/20  0807   WBC 8.0     Recent Labs     07/22/20  0807   RBC 4.01*   HEMOGLOBIN 11.1*   HEMATOCRIT 36.1*   PLATELETCT 165       Imaging  CT:    Reviewed  Echo:   Reviewed  Ultrasound:  Reviewed    Assessment/Plan  * SAH (subarachnoid hemorrhage) (HCC)  Assessment & Plan  H&H 1 Amezquita 1 SAH ruptured PCOM s/p coiling  SBP < 160  Neurochecks every 4 hours  Nimodipine for DCI prevention  Daily TCDs, no vasospasms  Continue adequate bp controle  Continue ICU due to high risk vasospasms  Keppra PPX x 7 days  PT/OT/SLP  Ct head 7/22 stable, no changes  Ongoing intermittent ha, negative tcd        Hyperkalemia  Assessment & Plan  5.9, repeat potassium this evening  Hold lisinopril    Headache  Assessment & Plan  2/2 to SAH  May be harder to control given her chronic pain  Analgesic and antiemetics prn  Gabapentin    Received decadron  Tylenol/morphine/oxycodone prn  Positive drug tox, avoid long term use  Allergic to nsaids  Tolerating so far, intermittent ha  Valproic acid once    Pain, chronic  Assessment & Plan  Morphine prn  Tylenol prn  Oxycodone prn  Adequate control currently  Avoid  long term use due to positive drug screen multiple drugs including cocaine/opioids  Patient denies drug use    HTN (hypertension)  Assessment & Plan  Amlodipine and lisinopril, hold lisinopril, change to hydralazine scheduled with hyperkalemia  Prn medications  Goal SBP < 160       VTE:  Not Indicated  Ulcer: Not Indicated  Lines: None    I have performed a physical exam and reviewed and updated ROS and Plan today (7/25/2020). In review of yesterday's note (7/24/2020), there are no changes except as documented above.     Discussed patient condition and risk of morbidity and/or mortality with RN, RT, Therapies, Pharmacy, Code status disscussed, Charge nurse / hot rounds, Patient and neurosurgery

## 2020-07-25 NOTE — PROGRESS NOTES
"Per Dr. Womack, give Toradol 30mg IV push once to see if it helps with the patient's pain.  Patient has a allergy to motrin listed.  I verfied what type of reaction she has to motrin and she states she gets hives.  Informed Dr. Woamck and he wanted to go ahead and try the toradol and stated I could give benadryl if need.  When I went to administer the medication and told the patient what I was giving her she stated \"oh no I can't take that! It makes my throat swell up\".  I informed her that that was not listed in her allergies and stated I would add that medication and the reaction to her allergy list.  Toradol was not administered and was charted agaisnt in eMAR.  The patient then proceeded to tell me that she told Dr. Womack that too much morphine can cause her to have headaches also.  She then stated \"yeah I remember when I had pain in prior hospital stays and the morphine gave me headaches they then gave me something else that worked even better.  I think it was called Dilaudid.\".  After this interaction Dr. Velasquez was notified of these statements and that the toradol was not given.  He stated to continue current pain regimen.    "

## 2020-07-26 ENCOUNTER — APPOINTMENT (OUTPATIENT)
Dept: RADIOLOGY | Facility: MEDICAL CENTER | Age: 40
DRG: 021 | End: 2020-07-26
Attending: PSYCHIATRY & NEUROLOGY

## 2020-07-26 PROBLEM — N17.9 AKI (ACUTE KIDNEY INJURY) (HCC): Status: ACTIVE | Noted: 2020-07-26

## 2020-07-26 PROBLEM — Z91.199 NONCOMPLIANCE: Status: ACTIVE | Noted: 2020-07-26

## 2020-07-26 PROBLEM — E87.1 HYPONATREMIA: Status: ACTIVE | Noted: 2020-07-26

## 2020-07-26 PROBLEM — R79.89 LOW TSH LEVEL: Status: ACTIVE | Noted: 2020-07-26

## 2020-07-26 PROBLEM — R82.5 POSITIVE URINE DRUG SCREEN: Status: ACTIVE | Noted: 2020-07-26

## 2020-07-26 LAB
ANION GAP SERPL CALC-SCNC: 10 MMOL/L (ref 7–16)
BUN SERPL-MCNC: 18 MG/DL (ref 8–22)
CALCIUM SERPL-MCNC: 9.5 MG/DL (ref 8.5–10.5)
CHLORIDE SERPL-SCNC: 92 MMOL/L (ref 96–112)
CO2 SERPL-SCNC: 25 MMOL/L (ref 20–33)
CREAT SERPL-MCNC: 1.24 MG/DL (ref 0.5–1.4)
GLUCOSE SERPL-MCNC: 99 MG/DL (ref 65–99)
POTASSIUM SERPL-SCNC: 5.2 MMOL/L (ref 3.6–5.5)
SODIUM SERPL-SCNC: 127 MMOL/L (ref 135–145)
SODIUM SERPL-SCNC: 131 MMOL/L (ref 135–145)

## 2020-07-26 PROCEDURE — 84295 ASSAY OF SERUM SODIUM: CPT

## 2020-07-26 PROCEDURE — 99232 SBSQ HOSP IP/OBS MODERATE 35: CPT | Performed by: INTERNAL MEDICINE

## 2020-07-26 PROCEDURE — 700111 HCHG RX REV CODE 636 W/ 250 OVERRIDE (IP): Performed by: PSYCHIATRY & NEUROLOGY

## 2020-07-26 PROCEDURE — A9270 NON-COVERED ITEM OR SERVICE: HCPCS | Performed by: PSYCHIATRY & NEUROLOGY

## 2020-07-26 PROCEDURE — 93888 INTRACRANIAL LIMITED STUDY: CPT | Mod: 26 | Performed by: INTERNAL MEDICINE

## 2020-07-26 PROCEDURE — 700101 HCHG RX REV CODE 250: Performed by: PSYCHIATRY & NEUROLOGY

## 2020-07-26 PROCEDURE — 700111 HCHG RX REV CODE 636 W/ 250 OVERRIDE (IP): Performed by: INTERNAL MEDICINE

## 2020-07-26 PROCEDURE — 93888 INTRACRANIAL LIMITED STUDY: CPT

## 2020-07-26 PROCEDURE — 80048 BASIC METABOLIC PNL TOTAL CA: CPT

## 2020-07-26 PROCEDURE — 700102 HCHG RX REV CODE 250 W/ 637 OVERRIDE(OP): Performed by: PSYCHIATRY & NEUROLOGY

## 2020-07-26 PROCEDURE — A9270 NON-COVERED ITEM OR SERVICE: HCPCS | Performed by: INTERNAL MEDICINE

## 2020-07-26 PROCEDURE — 770006 HCHG ROOM/CARE - MED/SURG/GYN SEMI*

## 2020-07-26 PROCEDURE — 700102 HCHG RX REV CODE 250 W/ 637 OVERRIDE(OP): Performed by: INTERNAL MEDICINE

## 2020-07-26 PROCEDURE — 99255 IP/OBS CONSLTJ NEW/EST HI 80: CPT | Performed by: FAMILY MEDICINE

## 2020-07-26 RX ORDER — MORPHINE SULFATE 15 MG/1
15 TABLET, FILM COATED, EXTENDED RELEASE ORAL EVERY 12 HOURS
Status: DISCONTINUED | OUTPATIENT
Start: 2020-07-26 | End: 2020-07-26

## 2020-07-26 RX ORDER — MORPHINE SULFATE 4 MG/ML
4 INJECTION, SOLUTION INTRAMUSCULAR; INTRAVENOUS EVERY 4 HOURS PRN
Status: DISCONTINUED | OUTPATIENT
Start: 2020-07-26 | End: 2020-08-03 | Stop reason: HOSPADM

## 2020-07-26 RX ORDER — VALPROIC ACID 250 MG/1
250 CAPSULE, LIQUID FILLED ORAL EVERY 8 HOURS
Status: COMPLETED | OUTPATIENT
Start: 2020-07-26 | End: 2020-07-27

## 2020-07-26 RX ORDER — MORPHINE SULFATE 15 MG/1
15 TABLET, FILM COATED, EXTENDED RELEASE ORAL EVERY 12 HOURS
Status: COMPLETED | OUTPATIENT
Start: 2020-07-26 | End: 2020-07-28

## 2020-07-26 RX ADMIN — AMLODIPINE BESYLATE 10 MG: 10 TABLET ORAL at 05:11

## 2020-07-26 RX ADMIN — LIDOCAINE 1 PATCH: 50 PATCH TOPICAL at 13:52

## 2020-07-26 RX ADMIN — HEPARIN SODIUM 5000 UNITS: 5000 INJECTION, SOLUTION INTRAVENOUS; SUBCUTANEOUS at 05:11

## 2020-07-26 RX ADMIN — Medication 1 G: at 11:39

## 2020-07-26 RX ADMIN — NIMODIPINE 60 MG: 30 CAPSULE ORAL at 10:19

## 2020-07-26 RX ADMIN — OXYCODONE HYDROCHLORIDE 10 MG: 10 TABLET ORAL at 08:04

## 2020-07-26 RX ADMIN — GABAPENTIN 300 MG: 300 CAPSULE ORAL at 05:11

## 2020-07-26 RX ADMIN — GABAPENTIN 300 MG: 300 CAPSULE ORAL at 17:12

## 2020-07-26 RX ADMIN — ANTACID TABLETS 500 MG: 500 TABLET, CHEWABLE ORAL at 05:13

## 2020-07-26 RX ADMIN — HYDRALAZINE HYDROCHLORIDE 25 MG: 25 TABLET, FILM COATED ORAL at 13:48

## 2020-07-26 RX ADMIN — VALPROIC ACID 250 MG: 250 CAPSULE, LIQUID FILLED ORAL at 22:44

## 2020-07-26 RX ADMIN — Medication 1 G: at 17:12

## 2020-07-26 RX ADMIN — MORPHINE SULFATE 15 MG: 15 TABLET, FILM COATED, EXTENDED RELEASE ORAL at 17:12

## 2020-07-26 RX ADMIN — NIMODIPINE 60 MG: 30 CAPSULE ORAL at 13:48

## 2020-07-26 RX ADMIN — OXYCODONE HYDROCHLORIDE 10 MG: 10 TABLET ORAL at 02:58

## 2020-07-26 RX ADMIN — NIMODIPINE 60 MG: 30 CAPSULE ORAL at 17:12

## 2020-07-26 RX ADMIN — GABAPENTIN 300 MG: 300 CAPSULE ORAL at 11:39

## 2020-07-26 RX ADMIN — MORPHINE SULFATE 4 MG: 4 INJECTION INTRAVENOUS at 07:34

## 2020-07-26 RX ADMIN — MORPHINE SULFATE 4 MG: 4 INJECTION INTRAVENOUS at 22:44

## 2020-07-26 RX ADMIN — NIMODIPINE 60 MG: 30 CAPSULE ORAL at 22:44

## 2020-07-26 RX ADMIN — MORPHINE SULFATE 4 MG: 4 INJECTION INTRAVENOUS at 18:34

## 2020-07-26 RX ADMIN — HYDRALAZINE HYDROCHLORIDE 25 MG: 25 TABLET, FILM COATED ORAL at 05:13

## 2020-07-26 RX ADMIN — MORPHINE SULFATE 4 MG: 4 INJECTION INTRAVENOUS at 05:11

## 2020-07-26 RX ADMIN — MORPHINE SULFATE 15 MG: 15 TABLET, EXTENDED RELEASE ORAL at 10:19

## 2020-07-26 RX ADMIN — HYDRALAZINE HYDROCHLORIDE 25 MG: 25 TABLET, FILM COATED ORAL at 22:44

## 2020-07-26 RX ADMIN — NIMODIPINE 60 MG: 30 CAPSULE ORAL at 02:58

## 2020-07-26 RX ADMIN — HEPARIN SODIUM 5000 UNITS: 5000 INJECTION, SOLUTION INTRAVENOUS; SUBCUTANEOUS at 22:43

## 2020-07-26 RX ADMIN — HEPARIN SODIUM 5000 UNITS: 5000 INJECTION, SOLUTION INTRAVENOUS; SUBCUTANEOUS at 13:48

## 2020-07-26 RX ADMIN — VALPROIC ACID 250 MG: 250 CAPSULE, LIQUID FILLED ORAL at 14:44

## 2020-07-26 RX ADMIN — Medication 1 G: at 05:31

## 2020-07-26 RX ADMIN — NIMODIPINE 60 MG: 30 CAPSULE ORAL at 05:11

## 2020-07-26 RX ADMIN — LEVETIRACETAM 500 MG: 500 TABLET ORAL at 17:12

## 2020-07-26 RX ADMIN — DOCUSATE SODIUM 50 MG AND SENNOSIDES 8.6 MG 2 TABLET: 8.6; 5 TABLET, FILM COATED ORAL at 05:11

## 2020-07-26 RX ADMIN — DOCUSATE SODIUM 50 MG AND SENNOSIDES 8.6 MG 2 TABLET: 8.6; 5 TABLET, FILM COATED ORAL at 17:12

## 2020-07-26 RX ADMIN — MORPHINE SULFATE 4 MG: 4 INJECTION INTRAVENOUS at 13:48

## 2020-07-26 RX ADMIN — LEVETIRACETAM 500 MG: 500 TABLET ORAL at 05:11

## 2020-07-26 RX ADMIN — MORPHINE SULFATE 4 MG: 4 INJECTION INTRAVENOUS at 02:58

## 2020-07-26 ASSESSMENT — ENCOUNTER SYMPTOMS
SHORTNESS OF BREATH: 0
DOUBLE VISION: 0
EYE DISCHARGE: 0
SPEECH CHANGE: 0
FLANK PAIN: 0
STRIDOR: 0
WHEEZING: 0
DIZZINESS: 0
BLOOD IN STOOL: 0
COUGH: 0
NERVOUS/ANXIOUS: 0
WEAKNESS: 0
HEMOPTYSIS: 0
PALPITATIONS: 0
BACK PAIN: 0
VOMITING: 0
SORE THROAT: 0
HEADACHES: 1
TINGLING: 0
MYALGIAS: 0
ABDOMINAL PAIN: 0
DEPRESSION: 0
EYE PAIN: 0
NAUSEA: 0
SPUTUM PRODUCTION: 0
SINUS PAIN: 0
CHILLS: 0
EYE REDNESS: 0
POLYDIPSIA: 0
BLURRED VISION: 0
WEIGHT LOSS: 0
NECK PAIN: 0
SENSORY CHANGE: 0
DIAPHORESIS: 0
BRUISES/BLEEDS EASILY: 0
HEARTBURN: 0
FOCAL WEAKNESS: 0
DIARRHEA: 0
PHOTOPHOBIA: 0
FEVER: 0

## 2020-07-26 NOTE — PROGRESS NOTES
Triage note:      ICU transfer    40 y.o. 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  Interventional radiology was called on the case as well as neurosurgery, patient underwent endovascular repair of the PCOM aneurysm.    Ok for the floor    Transferring MD; Dr. Lopez

## 2020-07-26 NOTE — PROGRESS NOTES
2 RN Skin Check    2 RN skin check complete, done with JOE Menendez. No skin issues noted. R groin site soft and non-tender.    Devices in place: N/A.  Skin assessed under devices: N\A.  Confirmed pressure ulcers found on: N/A.  New potential pressure ulcers noted on N/A. Wound consult placed N/A.  The following interventions in place Pillows and Lotion.

## 2020-07-26 NOTE — PROGRESS NOTES
Patient remains neurologically intact.  POD # 5 embolization of ruptured PCOM aneurysm.  TCD's stable.   Chronic on acute/intermittent HA: received Decadron, + drug tox, on Gabapentin, Valproic acid once: consider daily maintenance dosing  On Heparin DVT PPX    Ok to transfer to floor from Nsx view.   Nsx signed off: call prn.  No outpatient f/u with NSX, f/u with Dr. Reeves as indicated.

## 2020-07-26 NOTE — CONSULTS
Hospital Medicine Consultation    Date of Service  2020    Referring Physician  Mingo Velasquez M.D.    Consulting Physician  Taco Lopez M.D.    Reason for Consultation  Medical management    History of Presenting Illness  40 y.o. 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  Interventional radiology was called on the case as well as neurosurgery, patient underwent endovascular repair of the PCOM aneurysm.  Repeat CT scan shows no increased hemorrhage.  TCD's have been negative so far.  Patient with known history of hypertension, admits to noncompliance with taking her medications.  Hypertension currently well controlled.  Care and neurosurgery have cleared her for transfer from the ICU.    Review of Systems  Review of Systems   Constitutional: Negative for chills, diaphoresis, fever and malaise/fatigue.   HENT: Negative for congestion, hearing loss and sore throat.    Eyes: Negative for blurred vision, pain, discharge and redness.   Respiratory: Negative for cough, shortness of breath and wheezing.    Cardiovascular: Negative for chest pain, palpitations and leg swelling.   Gastrointestinal: Negative for abdominal pain, diarrhea, heartburn, nausea and vomiting.   Genitourinary: Negative for dysuria, flank pain and hematuria.   Musculoskeletal: Negative for back pain, joint pain, myalgias and neck pain.   Skin: Negative for rash.   Neurological: Positive for headaches. Negative for dizziness, sensory change, speech change, focal weakness and weakness.   Psychiatric/Behavioral: The patient is not nervous/anxious.        Past Medical History   has a past medical history of Asthma, Depression, cholecystectomy, Kidney disease, and PTSD (post-traumatic stress disorder).    Surgical History   has a past surgical history that includes cholecystectomy; pr  delivery only; and tubal ligation.    Family History  Unknown    Social History   reports that she has  been smoking. She has been smoking about 0.10 packs per day. She does not have any smokeless tobacco history on file. She reports current drug use. Drug: Inhaled. She reports that she does not drink alcohol.    Medications  Prior to Admission Medications   Prescriptions Last Dose Informant Patient Reported? Taking?   amlodipine (NORVASC) 10 MG Tab   Yes No   Sig: Take 10 mg by mouth every day.   citalopram (CELEXA) 40 MG Tab   Yes No   Sig: Take 40 mg by mouth every day.   hydroCHLOROthiazide (HYDRODIURIL) 25 MG Tab   Yes Yes   Sig: Take 25 mg by mouth every day.   lisinopril (PRINIVIL) 40 MG tablet   Yes No   Sig: Take 40 mg by mouth every day.   metoprolol SR (TOPROL XL) 25 MG TABLET SR 24 HR   Yes Yes   Sig: Take 25 mg by mouth every day.      Facility-Administered Medications: None       Allergies  Allergies   Allergen Reactions   • Motrin [Ibuprofen] Hives   • Pcn [Penicillins]    • Toradol Anaphylaxis       Physical Exam  Temp:  [36.3 °C (97.3 °F)-36.9 °C (98.5 °F)] 36.9 °C (98.5 °F)  Pulse:  [54-78] 70  Resp:  [13-67] 24  BP: ()/(53-90) 117/58  SpO2:  [96 %-100 %] 100 %    Physical Exam  Vitals signs and nursing note reviewed.   HENT:      Head: Normocephalic and atraumatic.      Mouth/Throat:      Mouth: Mucous membranes are moist.   Eyes:      Extraocular Movements: Extraocular movements intact.      Conjunctiva/sclera: Conjunctivae normal.      Pupils: Pupils are equal, round, and reactive to light.   Neck:      Musculoskeletal: No muscular tenderness.   Cardiovascular:      Rate and Rhythm: Normal rate and regular rhythm.   Pulmonary:      Effort: Pulmonary effort is normal.      Breath sounds: Normal breath sounds.   Abdominal:      General: Bowel sounds are normal. There is no distension.      Palpations: Abdomen is soft.      Tenderness: There is no abdominal tenderness. There is no guarding or rebound.   Musculoskeletal:      Right lower leg: No edema.      Left lower leg: No edema.    Lymphadenopathy:      Cervical: No cervical adenopathy.   Skin:     General: Skin is warm and dry.   Neurological:      General: No focal deficit present.      Mental Status: She is alert and oriented to person, place, and time.      Cranial Nerves: No cranial nerve deficit.         Fluids      Laboratory      Recent Labs     07/24/20  0700 07/25/20  0436 07/25/20  2040 07/26/20  0520   SODIUM 133* 141  --  127*   POTASSIUM 4.3 5.9* 5.6* 5.2   CHLORIDE 98 103  --  92*   CO2 21 27  --  25   GLUCOSE 97 88  --  99   BUN 18 17  --  18   CREATININE 1.01 1.28  --  1.24   CALCIUM 9.4 9.4  --  9.5                     Imaging  US-INTRACRANIAL ARTERY LIMITED   Final Result      US-INTRACRANIAL ARTERY LIMITED   Final Result      US-INTRACRANIAL ARTERY LIMITED   Final Result      US-INTRACRANIAL ARTERY LIMITED   Final Result      US-INTRACRANIAL ARTERY LIMITED   Final Result      CT-HEAD W/O   Final Result      1.  No CT evidence of acute infarct, hemorrhage or mass.   2.  Right suprasellar aneurysm coil.      EC-ECHOCARDIOGRAM COMPLETE W/O CONT   Final Result      US-INTRACRANIAL ARTERY COMP   Final Result      IR-EMBOLIZATION   Final Result   Addendum 1 of 1   Addendum:      Used coils:   First coil:Microvention [place] Soft 10 Endovascular Embolization Coil 3D    [id number] placed in the Right PCOM. Ref 3161-5550. Lot [id number]   ?   Second coil:   Microvention Hydro Soft 10 Endovascular Embolization Coil 3D 1.7mdx6bp    placed in the Right PCOM. Ref 4805-5848. Lot [id number]      coil which is not used:      Hydrocoil 10 [id number] LOT no:[id number]      Final      CT-CTA HEAD WITH & W/O-POST PROCESS   Final Result         1.  3.1 mm right aneurysm, likely from the supraclinoid segment.   2.  Right sylvian fissure, frontal, and bilateral basal cistern, appearance compatible subarachnoid hemorrhage.      These findings were discussed with the patient's clinician, Daniel Leiva, on 7/20/2020 9:51 PM.      CT-CTA NECK WITH &  W/O-POST PROCESSING   Final Result         1.  CT angiogram of the neck within normal limits.             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  Nimodipine  TCD daily until 7/31/2020    Positive urine drug screen- (present on admission)  Assessment & Plan  UDS + for cocaine, THC    Low TSH level- (present on admission)  Assessment & Plan  Recheck TSH    RODO (acute kidney injury) (HCC)- (present on admission)  Assessment & Plan  Follow bmp  Check PTH, UA    Hyponatremia- (present on admission)  Assessment & Plan  salt tabs  Serial sodium    Noncompliance- (present on admission)  Assessment & Plan  Counseling    Hyperkalemia  Assessment & Plan  Follow BMP  Hold lisinopril    Headache- (present on admission)  Assessment & Plan  Pain control    Pain, chronic- (present on admission)  Assessment & Plan  Pain control    HTN (hypertension)- (present on admission)  Assessment & Plan  Norvasc, hydralazine  IV labetalol as needed with parameters

## 2020-07-26 NOTE — PROGRESS NOTES
Pt arrived to [facility]- in a wheelchair by transport from [place] [address]. Pt settled in room, no issues noted. [facility].

## 2020-07-26 NOTE — PROGRESS NOTES
Critical Care Progress Note    Date of admission  7/20/2020    Chief Complaint  40 y.o. female admitted 7/20/2020 with SAH from R PCOM aneurs    Hospital Course    7/20 admitted for SAH H&H 1, Amezquita grade 1, from ruptured PCOM aneurysm s/p coiling      Interval Problem Update  Reviewed last 24 hour events:  tcd negative  Ongoing headache especially on awakening  Ms contin 15 mg bid for 4 doses  Valproic acid scheduled  Prn decadron  Morphine adjusted to q 4 hours  Stopped oxycodone  q 4 hour neuro checks  Discussed with neuro surgery, ok to transfer out of icu  Sodium 127, on repeat 131.  She did have 2 L urinary output overnight  Cl 92  k 5.2  Continue hydralazine scheduled, hold lisinopril  On amlodipine  Salt tabs    Review of Systems  Review of Systems   Constitutional: Negative for chills, diaphoresis, fever, malaise/fatigue and weight loss.   HENT: Negative for congestion and sinus pain.    Eyes: Negative for blurred vision, double vision and photophobia.   Respiratory: Negative for cough, hemoptysis, sputum production, shortness of breath, wheezing and stridor.    Cardiovascular: Negative for chest pain, palpitations and leg swelling.   Gastrointestinal: Negative for blood in stool, heartburn, melena and vomiting.   Genitourinary: Negative for dysuria and urgency.   Musculoskeletal: Negative for back pain, myalgias and neck pain.   Skin: Negative for itching and rash.   Neurological: Positive for headaches. Negative for dizziness, tingling, sensory change, speech change and focal weakness.   Endo/Heme/Allergies: Negative for polydipsia. Does not bruise/bleed easily.   Psychiatric/Behavioral: Negative for depression. The patient is not nervous/anxious.         Vital Signs for last 24 hours   Temp:  [36.3 °C (97.3 °F)-36.9 °C (98.4 °F)] 36.9 °C (98.4 °F)  Pulse:  [54-78] 57  Resp:  [13-20] 16  BP: ()/(53-90) 113/62  SpO2:  [96 %-100 %] 99 %    Hemodynamic parameters for last 24 hours       Respiratory  Information for the last 24 hours       Physical Exam   Physical Exam  Vitals signs and nursing note reviewed.   Constitutional:       General: She is not in acute distress.     Appearance: Normal appearance. She is not ill-appearing, toxic-appearing or diaphoretic.      Comments: Improving fatigue, headache less appearance of discomfort   HENT:      Head: Normocephalic and atraumatic.      Right Ear: External ear normal.      Left Ear: External ear normal.      Nose: No congestion or rhinorrhea.      Mouth/Throat:      Mouth: Mucous membranes are moist.      Pharynx: No oropharyngeal exudate or posterior oropharyngeal erythema.   Eyes:      General: No scleral icterus.     Extraocular Movements: Extraocular movements intact.      Conjunctiva/sclera: Conjunctivae normal.      Pupils: Pupils are equal, round, and reactive to light.   Neck:      Musculoskeletal: Neck supple. No neck rigidity or muscular tenderness.   Cardiovascular:      Rate and Rhythm: Normal rate and regular rhythm.      Pulses: Normal pulses.      Heart sounds: Normal heart sounds. No murmur.   Pulmonary:      Effort: No respiratory distress.      Breath sounds: No wheezing.   Abdominal:      General: There is no distension.      Palpations: There is no mass.      Tenderness: There is no abdominal tenderness. There is no guarding.   Musculoskeletal:         General: No swelling or tenderness.      Right lower leg: No edema.      Left lower leg: No edema.   Lymphadenopathy:      Cervical: No cervical adenopathy.   Skin:     Coloration: Skin is not jaundiced or pale.      Findings: No bruising, erythema, lesion or rash.   Neurological:      General: No focal deficit present.      Mental Status: She is alert and oriented to person, place, and time.      Cranial Nerves: No cranial nerve deficit.      Sensory: No sensory deficit.      Motor: No weakness.      Coordination: Coordination normal.      Deep Tendon Reflexes: Reflexes normal.   Psychiatric:          Mood and Affect: Mood normal.         Behavior: Behavior normal.         Medications  Current Facility-Administered Medications   Medication Dose Route Frequency Provider Last Rate Last Dose   • hydrALAZINE (APRESOLINE) tablet 25 mg  25 mg Oral Q8HRS Mingo Velasquez M.D.   25 mg at 07/26/20 0513   • sodium chloride (SALT) tablet 1 g  1 g Oral TID WITH MEALS Mingo Velasquez M.D.   1 g at 07/26/20 0531   • calcium carbonate (TUMS) chewable tab 500 mg  500 mg Oral DAILY Norm Marie M.D.   500 mg at 07/26/20 0513   • heparin injection 5,000 Units  5,000 Units Subcutaneous Q8HRS Cristiano Carlisle M.D.   5,000 Units at 07/26/20 0511   • albuterol inhaler 2 Puff  2 Puff Inhalation Q4H PRN (RT) Norm Marie M.D.       • levETIRAcetam (KEPPRA) tablet 500 mg  500 mg Oral BID Cristiano Carlisle M.D.   500 mg at 07/26/20 0511   • amLODIPine (NORVASC) tablet 10 mg  10 mg Oral DAILY Cristiano Carlisle M.D.   10 mg at 07/26/20 0511   • oxyCODONE immediate release (ROXICODONE) tablet 10 mg  10 mg Oral Q4HRS PRN Cristiano Carlisle M.D.   10 mg at 07/26/20 0258   • lidocaine (LIDODERM) 5 % 1 Patch  1 Patch Transdermal Q24HR Cristiano Carlisle M.D.   Stopped at 07/25/20 1400   • gabapentin (NEURONTIN) capsule 300 mg  300 mg Oral TID Cristiano Carlisle M.D.   300 mg at 07/26/20 0511   • senna-docusate (PERICOLACE or SENOKOT S) 8.6-50 MG per tablet 2 Tab  2 Tab Oral BID Norm Marie M.D.   2 Tab at 07/26/20 0511    And   • polyethylene glycol/lytes (MIRALAX) PACKET 1 Packet  1 Packet Oral QDAY PRN Norm Marie M.D.        And   • magnesium hydroxide (MILK OF MAGNESIA) suspension 30 mL  30 mL Oral QDAY PRN Norm Marie M.D.        And   • bisacodyl (DULCOLAX) suppository 10 mg  10 mg Rectal QDAY PRN Norm Marie M.D.       • Respiratory Therapy Consult   Nebulization Continuous RT Norm Marie M.D.       • acetaminophen (TYLENOL) tablet 650 mg  650 mg Oral Q6HRS PRN Norm Marie M.D.   650 mg at 07/25/20 1303   •  labetalol (NORMODYNE/TRANDATE) injection 10 mg  10 mg Intravenous Q4HRS SOILA Marie M.D.   10 mg at 07/21/20 0204   • ondansetron (ZOFRAN) syringe/vial injection 4 mg  4 mg Intravenous Q4HRS SOILA Marie M.D.       • ondansetron (ZOFRAN ODT) dispertab 4 mg  4 mg Oral Q4HRS SOILA Marie M.D.       • promethazine (PHENERGAN) tablet 12.5-25 mg  12.5-25 mg Oral Q4HRS SOILA Marie M.D.       • promethazine (PHENERGAN) suppository 12.5-25 mg  12.5-25 mg Rectal Q4HRS SOILA Marie M.D.       • prochlorperazine (COMPAZINE) injection 5-10 mg  5-10 mg Intravenous Q4HRS SOILA Marie M.D.       • morphine (pf) 4 MG/ML injection 4 mg  4 mg Intravenous Q2HRS SOILA Marie M.D.   4 mg at 07/26/20 0511   • niMODipine (NIMOTOP) capsule 60 mg  60 mg Oral Q4HRS Cristiano Carlisle M.D.   60 mg at 07/26/20 0511       Fluids    Intake/Output Summary (Last 24 hours) at 7/26/2020 0728  Last data filed at 7/26/2020 0643  Gross per 24 hour   Intake 2360 ml   Output 4400 ml   Net -2040 ml       Laboratory          Recent Labs     07/24/20  0700 07/25/20  0436 07/25/20  2040 07/26/20  0520   SODIUM 133* 141  --  127*   POTASSIUM 4.3 5.9* 5.6* 5.2   CHLORIDE 98 103  --  92*   CO2 21 27  --  25   BUN 18 17  --  18   CREATININE 1.01 1.28  --  1.24   CALCIUM 9.4 9.4  --  9.5     Recent Labs     07/24/20  0700 07/25/20  0436 07/26/20  0520   GLUCOSE 97 88 99         No results for input(s): RBC, HEMOGLOBIN, HEMATOCRIT, PLATELETCT, PROTHROMBTM, APTT, INR, IRON, FERRITIN, TOTIRONBC in the last 72 hours.    Imaging  CT:    Reviewed  Echo:   Reviewed  Ultrasound:  Reviewed    Assessment/Plan  * SAH (subarachnoid hemorrhage) (HCC)- (present on admission)  Assessment & Plan  H&H 1 Amezquita 1 SAH ruptured PCOM s/p coiling  SBP < 160  Neurochecks every 4 hours  Nimodipine for DCI prevention  Daily TCDs, no vasospasms  Continue adequate bp controle  Continue ICU due to high risk vasospasms  Keppra PPX x 7  days  PT/OT/SLP  Ct head 7/22 stable, no changes  Ongoing intermittent ha, negative tcd  Changed pain regimen for headache  Ok to transfer out of ICU        Positive urine drug screen- (present on admission)  Assessment & Plan  Cocaine, marijuana and opioids  Patient denies drug use    Hyponatremia- (present on admission)  Assessment & Plan  127 from 141, given ns, salt tabs  Improved to 131    Hyperkalemia  Assessment & Plan  5.9, repeat potassium this evening  Continue to hold lisinopril    Headache- (present on admission)  Assessment & Plan  2/2 to SAH  May be harder to control given her chronic pain  Analgesic and antiemetics prn  Gabapentin    Received decadron  Tylenol/morphine/oxycodone prn  Positive drug tox, avoid long term use  Allergic to nsaids  Tolerating so far, intermittent ha  Valproic acid once, then scheduled  Short 2 day course ms contin  Stopped oxycodone  Avoid long term use narcotics as drug use outpt    Pain, chronic- (present on admission)  Assessment & Plan  Morphine prn  Tylenol prn  Oxycodone prn  Adequate control currently  Avoid long term use due to positive drug screen multiple drugs including cocaine/opioids  Patient denies drug use  Changed pain regimen  Valproic acid scheduled, decadron can be used prn  Ms contin scheduled bid 4 days  Prn morphine adjusted frequency    HTN (hypertension)- (present on admission)  Assessment & Plan  Amlodipine and lisinopril, hold lisinopril, change to hydralazine scheduled with hyperkalemia  Prn medications  Goal SBP < 160  Continue hydralazine, continue to hold lisinopril       VTE:  Not Indicated  Ulcer: Not Indicated  Lines: None    I have performed a physical exam and reviewed and updated ROS and Plan today (7/26/2020). In review of yesterday's note (7/25/2020), there are no changes except as documented above.     Discussed patient condition and risk of morbidity and/or mortality with Hospitalist, RN, RT, Therapies, Pharmacy, Code status  disscussed, Charge nurse / hot rounds, Patient and neurosurgery

## 2020-07-27 ENCOUNTER — APPOINTMENT (OUTPATIENT)
Dept: RADIOLOGY | Facility: MEDICAL CENTER | Age: 40
DRG: 021 | End: 2020-07-27
Attending: PSYCHIATRY & NEUROLOGY

## 2020-07-27 LAB
ANION GAP SERPL CALC-SCNC: 12 MMOL/L (ref 7–16)
BASOPHILS # BLD AUTO: 0 % (ref 0–1.8)
BASOPHILS # BLD: 0 K/UL (ref 0–0.12)
BUN SERPL-MCNC: 16 MG/DL (ref 8–22)
CALCIUM SERPL-MCNC: 9.4 MG/DL (ref 8.5–10.5)
CHLORIDE SERPL-SCNC: 96 MMOL/L (ref 96–112)
CO2 SERPL-SCNC: 25 MMOL/L (ref 20–33)
CREAT SERPL-MCNC: 1.06 MG/DL (ref 0.5–1.4)
EOSINOPHIL # BLD AUTO: 0.1 K/UL (ref 0–0.51)
EOSINOPHIL NFR BLD: 0.9 % (ref 0–6.9)
ERYTHROCYTE [DISTWIDTH] IN BLOOD BY AUTOMATED COUNT: 49 FL (ref 35.9–50)
GLUCOSE SERPL-MCNC: 92 MG/DL (ref 65–99)
HCT VFR BLD AUTO: 42.4 % (ref 37–47)
HGB BLD-MCNC: 13.3 G/DL (ref 12–16)
LYMPHOCYTES # BLD AUTO: 6.96 K/UL (ref 1–4.8)
LYMPHOCYTES NFR BLD: 60.5 % (ref 22–41)
MANUAL DIFF BLD: NORMAL
MCH RBC QN AUTO: 28.1 PG (ref 27–33)
MCHC RBC AUTO-ENTMCNC: 31.4 G/DL (ref 33.6–35)
MCV RBC AUTO: 89.5 FL (ref 81.4–97.8)
MONOCYTES # BLD AUTO: 0.61 K/UL (ref 0–0.85)
MONOCYTES NFR BLD AUTO: 5.3 % (ref 0–13.4)
MORPHOLOGY BLD-IMP: NORMAL
NEUTROPHILS # BLD AUTO: 3.83 K/UL (ref 2–7.15)
NEUTROPHILS NFR BLD: 33.3 % (ref 44–72)
NRBC # BLD AUTO: 0 K/UL
NRBC BLD-RTO: 0 /100 WBC
PLATELET # BLD AUTO: 213 K/UL (ref 164–446)
PLATELET BLD QL SMEAR: NORMAL
PMV BLD AUTO: 11.4 FL (ref 9–12.9)
POTASSIUM SERPL-SCNC: 4.7 MMOL/L (ref 3.6–5.5)
PTH-INTACT SERPL-MCNC: 14.7 PG/ML (ref 14–72)
RBC # BLD AUTO: 4.74 M/UL (ref 4.2–5.4)
RBC BLD AUTO: NORMAL
SODIUM SERPL-SCNC: 133 MMOL/L (ref 135–145)
TSH SERPL DL<=0.005 MIU/L-ACNC: 0.01 UIU/ML (ref 0.38–5.33)
WBC # BLD AUTO: 11.5 K/UL (ref 4.8–10.8)

## 2020-07-27 PROCEDURE — A9270 NON-COVERED ITEM OR SERVICE: HCPCS | Performed by: PSYCHIATRY & NEUROLOGY

## 2020-07-27 PROCEDURE — 700101 HCHG RX REV CODE 250: Performed by: PSYCHIATRY & NEUROLOGY

## 2020-07-27 PROCEDURE — 85027 COMPLETE CBC AUTOMATED: CPT

## 2020-07-27 PROCEDURE — A9270 NON-COVERED ITEM OR SERVICE: HCPCS | Performed by: INTERNAL MEDICINE

## 2020-07-27 PROCEDURE — 700111 HCHG RX REV CODE 636 W/ 250 OVERRIDE (IP): Performed by: PSYCHIATRY & NEUROLOGY

## 2020-07-27 PROCEDURE — 83970 ASSAY OF PARATHORMONE: CPT

## 2020-07-27 PROCEDURE — 700111 HCHG RX REV CODE 636 W/ 250 OVERRIDE (IP): Performed by: INTERNAL MEDICINE

## 2020-07-27 PROCEDURE — 770006 HCHG ROOM/CARE - MED/SURG/GYN SEMI*

## 2020-07-27 PROCEDURE — 80048 BASIC METABOLIC PNL TOTAL CA: CPT

## 2020-07-27 PROCEDURE — 85007 BL SMEAR W/DIFF WBC COUNT: CPT

## 2020-07-27 PROCEDURE — 99233 SBSQ HOSP IP/OBS HIGH 50: CPT | Performed by: FAMILY MEDICINE

## 2020-07-27 PROCEDURE — 36415 COLL VENOUS BLD VENIPUNCTURE: CPT

## 2020-07-27 PROCEDURE — 700102 HCHG RX REV CODE 250 W/ 637 OVERRIDE(OP): Performed by: INTERNAL MEDICINE

## 2020-07-27 PROCEDURE — 93888 INTRACRANIAL LIMITED STUDY: CPT

## 2020-07-27 PROCEDURE — 700101 HCHG RX REV CODE 250: Performed by: INTERNAL MEDICINE

## 2020-07-27 PROCEDURE — 97165 OT EVAL LOW COMPLEX 30 MIN: CPT

## 2020-07-27 PROCEDURE — 93888 INTRACRANIAL LIMITED STUDY: CPT | Mod: 26 | Performed by: INTERNAL MEDICINE

## 2020-07-27 PROCEDURE — 84443 ASSAY THYROID STIM HORMONE: CPT

## 2020-07-27 PROCEDURE — 700102 HCHG RX REV CODE 250 W/ 637 OVERRIDE(OP): Performed by: PSYCHIATRY & NEUROLOGY

## 2020-07-27 RX ADMIN — Medication 1 G: at 17:13

## 2020-07-27 RX ADMIN — AMLODIPINE BESYLATE 10 MG: 10 TABLET ORAL at 04:12

## 2020-07-27 RX ADMIN — GABAPENTIN 300 MG: 300 CAPSULE ORAL at 04:12

## 2020-07-27 RX ADMIN — LEVETIRACETAM 500 MG: 500 TABLET ORAL at 17:13

## 2020-07-27 RX ADMIN — NIMODIPINE 60 MG: 30 CAPSULE ORAL at 14:33

## 2020-07-27 RX ADMIN — ACETAMINOPHEN 650 MG: 325 TABLET, FILM COATED ORAL at 02:03

## 2020-07-27 RX ADMIN — Medication 1 G: at 11:23

## 2020-07-27 RX ADMIN — HYDRALAZINE HYDROCHLORIDE 25 MG: 25 TABLET, FILM COATED ORAL at 21:09

## 2020-07-27 RX ADMIN — MORPHINE SULFATE 15 MG: 15 TABLET, FILM COATED, EXTENDED RELEASE ORAL at 17:12

## 2020-07-27 RX ADMIN — HYDRALAZINE HYDROCHLORIDE 25 MG: 25 TABLET, FILM COATED ORAL at 04:15

## 2020-07-27 RX ADMIN — GABAPENTIN 300 MG: 300 CAPSULE ORAL at 17:13

## 2020-07-27 RX ADMIN — MORPHINE SULFATE 4 MG: 4 INJECTION INTRAVENOUS at 18:00

## 2020-07-27 RX ADMIN — HEPARIN SODIUM 5000 UNITS: 5000 INJECTION, SOLUTION INTRAVENOUS; SUBCUTANEOUS at 21:09

## 2020-07-27 RX ADMIN — HEPARIN SODIUM 5000 UNITS: 5000 INJECTION, SOLUTION INTRAVENOUS; SUBCUTANEOUS at 14:33

## 2020-07-27 RX ADMIN — NIMODIPINE 60 MG: 30 CAPSULE ORAL at 02:03

## 2020-07-27 RX ADMIN — ACETAMINOPHEN 650 MG: 325 TABLET, FILM COATED ORAL at 11:23

## 2020-07-27 RX ADMIN — MORPHINE SULFATE 15 MG: 15 TABLET, FILM COATED, EXTENDED RELEASE ORAL at 04:12

## 2020-07-27 RX ADMIN — MORPHINE SULFATE 4 MG: 4 INJECTION INTRAVENOUS at 04:12

## 2020-07-27 RX ADMIN — VALPROIC ACID 250 MG: 250 CAPSULE, LIQUID FILLED ORAL at 04:15

## 2020-07-27 RX ADMIN — MORPHINE SULFATE 4 MG: 4 INJECTION INTRAVENOUS at 12:25

## 2020-07-27 RX ADMIN — LIDOCAINE 1 PATCH: 50 PATCH TOPICAL at 14:32

## 2020-07-27 RX ADMIN — Medication 1 G: at 08:02

## 2020-07-27 RX ADMIN — HYDRALAZINE HYDROCHLORIDE 25 MG: 25 TABLET, FILM COATED ORAL at 14:35

## 2020-07-27 RX ADMIN — NIMODIPINE 60 MG: 30 CAPSULE ORAL at 11:23

## 2020-07-27 RX ADMIN — ACETAMINOPHEN 650 MG: 325 TABLET, FILM COATED ORAL at 19:30

## 2020-07-27 RX ADMIN — HEPARIN SODIUM 5000 UNITS: 5000 INJECTION, SOLUTION INTRAVENOUS; SUBCUTANEOUS at 04:13

## 2020-07-27 RX ADMIN — GABAPENTIN 300 MG: 300 CAPSULE ORAL at 11:23

## 2020-07-27 RX ADMIN — NIMODIPINE 60 MG: 30 CAPSULE ORAL at 21:09

## 2020-07-27 RX ADMIN — DOCUSATE SODIUM 50 MG AND SENNOSIDES 8.6 MG 2 TABLET: 8.6; 5 TABLET, FILM COATED ORAL at 17:12

## 2020-07-27 RX ADMIN — NIMODIPINE 60 MG: 30 CAPSULE ORAL at 05:42

## 2020-07-27 RX ADMIN — POLYETHYLENE GLYCOL 3350 1 PACKET: 17 POWDER, FOR SOLUTION ORAL at 21:09

## 2020-07-27 RX ADMIN — ANTACID TABLETS 500 MG: 500 TABLET, CHEWABLE ORAL at 04:12

## 2020-07-27 RX ADMIN — LEVETIRACETAM 500 MG: 500 TABLET ORAL at 04:13

## 2020-07-27 ASSESSMENT — ENCOUNTER SYMPTOMS
NAUSEA: 0
SPEECH CHANGE: 0
DIAPHORESIS: 0
WEAKNESS: 1
PALPITATIONS: 0
COUGH: 0
BLURRED VISION: 0
HEADACHES: 1
NERVOUS/ANXIOUS: 1
HEARTBURN: 0
SORE THROAT: 0
SENSORY CHANGE: 0
DIARRHEA: 0
DIZZINESS: 0
FOCAL WEAKNESS: 0
BACK PAIN: 0
MYALGIAS: 0
CHILLS: 0
FEVER: 0
ABDOMINAL PAIN: 0
NECK PAIN: 0
WHEEZING: 0
FLANK PAIN: 0
SHORTNESS OF BREATH: 0
VOMITING: 0

## 2020-07-27 ASSESSMENT — COGNITIVE AND FUNCTIONAL STATUS - GENERAL
HELP NEEDED FOR BATHING: A LITTLE
SUGGESTED CMS G CODE MODIFIER MOBILITY: CH
DAILY ACTIVITIY SCORE: 24
MOBILITY SCORE: 24
TOILETING: A LITTLE
DRESSING REGULAR LOWER BODY CLOTHING: A LITTLE
SUGGESTED CMS G CODE MODIFIER DAILY ACTIVITY: CH
SUGGESTED CMS G CODE MODIFIER DAILY ACTIVITY: CJ
DAILY ACTIVITIY SCORE: 21

## 2020-07-27 ASSESSMENT — ACTIVITIES OF DAILY LIVING (ADL): TOILETING: INDEPENDENT

## 2020-07-27 ASSESSMENT — PAIN SCALES - GENERAL: PAIN_LEVEL: 6

## 2020-07-27 ASSESSMENT — FIBROSIS 4 INDEX: FIB4 SCORE: 0.45

## 2020-07-27 NOTE — PROGRESS NOTES
[facility] Medicine Daily Progress Note    Date of Service  7/27/2020    Chief Complaint  40 y.o. female admitted 7/20/2020 with SAH.    Hospital Course  40 y.o. 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  Interventional radiology was called on the case as well as neurosurgery, patient underwent endovascular repair of the PCOM aneurysm.  Repeat CT scan shows no increased hemorrhage.  TCD's have been negative so far.  Patient with known history of hypertension, admits to noncompliance with taking her medications.  Hypertension currently well controlled.  Care and neurosurgery have cleared her for transfer from the ICU.    Interval Problem Update  SAH - persistent headache  HTN - sbp 110-130  Hyponatremia -133    Consultants/Specialty  Critical care  Neurosurgery    Code Status  Full    Disposition  TBD    Review of Systems  Review of Systems   Constitutional: Negative for chills, diaphoresis, fever and malaise/fatigue.   HENT: Negative for congestion, hearing loss and sore throat.    Eyes: Negative for blurred vision.   Respiratory: Negative for cough, shortness of breath and wheezing.    Cardiovascular: Negative for chest pain, palpitations and leg swelling.   Gastrointestinal: Negative for abdominal pain, diarrhea, heartburn, nausea and vomiting.   Genitourinary: Negative for dysuria, flank pain and hematuria.   Musculoskeletal: Negative for back pain, joint pain, myalgias and neck pain.   Skin: Negative for rash.   Neurological: Positive for weakness and headaches. Negative for dizziness, sensory change, speech change and focal weakness.   Psychiatric/Behavioral: The patient is nervous/anxious.         Physical Exam  Temp:  [36.2 °C (97.2 °F)-36.9 °C (98.5 °F)] 36.4 °C (97.5 °F)  Pulse:  [62-74] 64  Resp:  [16-24] 16  BP: (117-131)/(58-73) 119/72  SpO2:  [97 %-100 %] 99 %    Physical Exam  Vitals signs and nursing note reviewed.   HENT:      Head: Normocephalic and  atraumatic.      Nose: No congestion.      Mouth/Throat:      Mouth: Mucous membranes are moist.   Eyes:      Extraocular Movements: Extraocular movements intact.      Conjunctiva/sclera: Conjunctivae normal.      Pupils: Pupils are equal, round, and reactive to light.   Neck:      Musculoskeletal: No muscular tenderness.   Cardiovascular:      Rate and Rhythm: Normal rate and regular rhythm.   Pulmonary:      Effort: Pulmonary effort is normal.      Breath sounds: Normal breath sounds.   Abdominal:      General: Bowel sounds are normal. There is no distension.      Palpations: Abdomen is soft.      Tenderness: There is no abdominal tenderness. There is no guarding or rebound.   Musculoskeletal:      Right lower leg: No edema.      Left lower leg: No edema.   Lymphadenopathy:      Cervical: No cervical adenopathy.   Skin:     General: Skin is warm and dry.   Neurological:      General: No focal deficit present.      Mental Status: She is alert and oriented to person, place, and time.      Cranial Nerves: No cranial nerve deficit.         Fluids    Intake/Output Summary (Last 24 hours) at 7/27/2020 1015  Last data filed at 7/26/2020 1600  Gross per 24 hour   Intake 480 ml   Output --   Net 480 ml       Laboratory  Recent Labs     07/27/20  0741   WBC 11.5*   RBC 4.74   HEMOGLOBIN 13.3   HEMATOCRIT 42.4   MCV 89.5   MCH 28.1   MCHC 31.4*   RDW 49.0   PLATELETCT 213   MPV 11.4     Recent Labs     07/25/20  0436 07/25/20  2040 07/26/20  0520 07/26/20  1130 07/27/20  0741   SODIUM 141  --  127* 131* 133*   POTASSIUM 5.9* 5.6* 5.2  --  4.7   CHLORIDE 103  --  92*  --  96   CO2 27  --  25  --  25   GLUCOSE 88  --  99  --  92   BUN 17  --  18  --  16   CREATININE 1.28  --  1.24  --  1.06   CALCIUM 9.4  --  9.5  --  9.4                   Imaging  US-INTRACRANIAL ARTERY LIMITED   Final Result      US-INTRACRANIAL ARTERY LIMITED   Final Result      US-INTRACRANIAL ARTERY LIMITED   Final Result      US-INTRACRANIAL ARTERY  LIMITED   Final Result      US-INTRACRANIAL ARTERY LIMITED   Final Result      CT-HEAD W/O   Final Result      1.  No CT evidence of acute infarct, hemorrhage or mass.   2.  Right suprasellar aneurysm coil.      EC-ECHOCARDIOGRAM COMPLETE W/O CONT   Final Result      US-INTRACRANIAL ARTERY COMP   Final Result      IR-EMBOLIZATION   Final Result   Addendum 1 of 1   Addendum:      Used coils:   First coil:Microvention [place] Soft 10 Endovascular Embolization Coil 3D    [id number] placed in the Right PCOM. Ref [id number]. Lot [id number]   ?   Second coil:   Microvention [place] Soft 10 Endovascular Embolization Coil 3D 1.6lzi0ha    placed in the Right PCOM. Ref [id number]. Lot [id number]      coil which is not used:      Hydrocoil 10 [id number] LOT no:[id number]      Final      CT-CTA HEAD WITH & W/O-POST PROCESS   Final Result         1.  3.1 mm right aneurysm, likely from the supraclinoid segment.   2.  Right sylvian fissure, frontal, and bilateral basal cistern, appearance compatible subarachnoid hemorrhage.      These findings were discussed with the patient's clinician, Daniel Leiva, on 7/20/2020 9:51 PM.      CT-CTA NECK WITH & W/O-POST PROCESSING   Final Result         1.  CT angiogram of the neck within normal limits.         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  Nimodipine  TCD daily until 7/31/2020    Positive urine drug screen- (present on admission)  Assessment & Plan  UDS + for cocaine, THC    Low TSH level- (present on admission)  Assessment & Plan  Check FT3    RODO (acute kidney injury) (HCC)- (present on admission)  Assessment & Plan  Follow bmp  UA pending    Hyponatremia- (present on admission)  Assessment & Plan  salt tabs  Follow bmp    Noncompliance- (present on admission)  Assessment &  Plan  Counseling    Hyperkalemia  Assessment & Plan  Follow BMP  Hold lisinopril    Headache- (present on admission)  Assessment & Plan  Pain control    Pain, chronic- (present on admission)  Assessment & Plan  Pain control    HTN (hypertension)- (present on admission)  Assessment & Plan  Norvasc, hydralazine  IV labetalol as needed with parameters         VTE prophylaxis: Heparin

## 2020-07-27 NOTE — ANESTHESIA POSTPROCEDURE EVALUATION
Patient: Adalid Hurley    Procedure Summary     Date:  07/20/20 Room / Location:  [facility] IMAGING - INTERVENTIONAL - [facility]    Anesthesia Start:  2307 Anesthesia Stop:  07/21/20 0100    Procedure:  IR-EMBOLIZATION Diagnosis:  (HEADACHE)    Scheduled Providers:  Gonzalez Rico M.D. Responsible Provider:  Gonzalez Rico M.D.    Anesthesia Type:  general ASA Status:  3 - Emergent          Final Anesthesia Type: general  Last vitals  BP   Blood Pressure: 119/72    Temp   36.4 °C (97.5 °F)    Pulse   Pulse: 64   Resp   16    SpO2   99 %      Anesthesia Post Evaluation    Patient location during evaluation: PACU  Patient participation: complete - patient participated  Level of consciousness: awake and alert  Pain score: 6    Airway patency: patent  Anesthetic complications: no  Cardiovascular status: hemodynamically stable  Respiratory status: acceptable  Hydration status: euvolemic    PONV: none           Nurse Pain Score: 8 (NPRS)

## 2020-07-27 NOTE — CARE PLAN
Problem: Safety  Goal: Will remain free from falls  Outcome: PROGRESSING AS EXPECTED  Pt educated on importance of utilizing call light when needing assistance r/t deficits from stroke. Call light and belongings within reach. Bed locked in lowest position. Bed alarm on. Fall precautions in place. Pt calls appropriately.       Problem: Venous Thromboembolism (VTW)/Deep Vein Thrombosis (DVT) Prevention:  Goal: Patient will participate in Venous Thrombosis (VTE)/Deep Vein Thrombosis (DVT)Prevention Measures  Outcome: PROGRESSING AS EXPECTED  Pt educated on importance of SCDs/ROM to prevent DVT/VTEs and stroke. Pt verbalizes understanding. SCDs in place. Pt ambulates occasionally and participates in ROM.

## 2020-07-27 NOTE — CARE PLAN
Pt A+Ox4, C/O headache on R side and over eyes. Pt on scheduled MS Contin and PRN IV morphine. Neuros intact. Pt S/P coiling of ruptured aneurysm, R groin site C/D/I, no s/s of bleeding/hematoma. Area soft and non-tender.

## 2020-07-27 NOTE — THERAPY
Occupational Therapy   Initial Evaluation     Patient Name: Adalid Hurley  Age:  40 y.o., Sex:  female  Medical Record #: [id number]  Today's Date: 7/27/2020     Precautions  Precautions: Fall Risk    Assessment  Patient is 40 y.o. female with a diagnosis of PCOM aneurysm s/p repair. Pt in general c/o dizziness and mild HA, but was able to perform ADLs/mobility w/o assist. Will have good support upon d/c.    Plan    Recommend Occupational Therapy for Evaluation only     Discharge recommendations:  Anticipate that the patient will have no further occupational therapy needs after discharge from the hospital.        07/27/20 1146   Prior Living Situation   Prior Services Home-Independent   Housing / Facility 2 Story House   Bathroom Set up Bathtub / Shower Combination;Shower Chair;Grab Bars   Lives with - Patient's Self Care Capacity Other (Comments)   Comments Pt is from [place], but will be d/cing to her sister's house in [place] that is handicap accessible. Sister will be available to help as needed.   Prior Level of ADL Function   Self Feeding Independent   Grooming / Hygiene Independent   Bathing Independent   Dressing Independent   Toileting Independent   Prior Level of IADL Function   Medication Management Independent   Laundry Independent   Kitchen Mobility Independent   Finances Independent   Home Management Independent   Shopping Independent   Prior Level Of Mobility Independent Without Device in Community;Independent Without Device in Home   Driving / Transportation Driving Independent   Occupation (Pre-Hospital Vocational) Employed Full Time   Active ROM Upper Body   Active ROM Upper Body  WDL   Strength Upper Body   Upper Body Strength  WDL   Upper Body Muscle Tone   Upper Body Muscle Tone  WDL   Coordination Upper Body   Coordination X   Comments baseline R hand incoordination due to hx stroke   Bed Mobility    Supine to Sit Supervised   Sit to Supine Supervised   Scooting Supervised   Rolling  Supervised   ADL Assessment   Grooming Supervision;Standing   Lower Body Dressing Supervision   Toileting Supervision   Functional Mobility   Sit to Stand Supervised   Bed, Chair, Wheelchair Transfer Supervised   Toilet Transfers Supervised   Mobility EOB>BR>BTB   Comments no AD   Anticipated Discharge Equipment   DC Equipment None

## 2020-07-28 ENCOUNTER — APPOINTMENT (OUTPATIENT)
Dept: RADIOLOGY | Facility: MEDICAL CENTER | Age: 40
DRG: 021 | End: 2020-07-28
Attending: PSYCHIATRY & NEUROLOGY

## 2020-07-28 LAB
ANION GAP SERPL CALC-SCNC: 13 MMOL/L (ref 7–16)
BASOPHILS # BLD AUTO: 0.6 % (ref 0–1.8)
BASOPHILS # BLD: 0.05 K/UL (ref 0–0.12)
BUN SERPL-MCNC: 14 MG/DL (ref 8–22)
CALCIUM SERPL-MCNC: 9.5 MG/DL (ref 8.5–10.5)
CHLORIDE SERPL-SCNC: 93 MMOL/L (ref 96–112)
CO2 SERPL-SCNC: 25 MMOL/L (ref 20–33)
CREAT SERPL-MCNC: 1.02 MG/DL (ref 0.5–1.4)
EOSINOPHIL # BLD AUTO: 0.08 K/UL (ref 0–0.51)
EOSINOPHIL NFR BLD: 0.9 % (ref 0–6.9)
ERYTHROCYTE [DISTWIDTH] IN BLOOD BY AUTOMATED COUNT: 48.3 FL (ref 35.9–50)
GLUCOSE SERPL-MCNC: 112 MG/DL (ref 65–99)
HCT VFR BLD AUTO: 42.3 % (ref 37–47)
HGB BLD-MCNC: 13.5 G/DL (ref 12–16)
IMM GRANULOCYTES # BLD AUTO: 0.07 K/UL (ref 0–0.11)
IMM GRANULOCYTES NFR BLD AUTO: 0.8 % (ref 0–0.9)
LYMPHOCYTES # BLD AUTO: 3.04 K/UL (ref 1–4.8)
LYMPHOCYTES NFR BLD: 34.2 % (ref 22–41)
MCH RBC QN AUTO: 27.7 PG (ref 27–33)
MCHC RBC AUTO-ENTMCNC: 31.9 G/DL (ref 33.6–35)
MCV RBC AUTO: 86.7 FL (ref 81.4–97.8)
MONOCYTES # BLD AUTO: 0.92 K/UL (ref 0–0.85)
MONOCYTES NFR BLD AUTO: 10.3 % (ref 0–13.4)
NEUTROPHILS # BLD AUTO: 4.73 K/UL (ref 2–7.15)
NEUTROPHILS NFR BLD: 53.2 % (ref 44–72)
NRBC # BLD AUTO: 0 K/UL
NRBC BLD-RTO: 0 /100 WBC
PLATELET # BLD AUTO: 224 K/UL (ref 164–446)
PMV BLD AUTO: 11.6 FL (ref 9–12.9)
POTASSIUM SERPL-SCNC: 4.4 MMOL/L (ref 3.6–5.5)
RBC # BLD AUTO: 4.88 M/UL (ref 4.2–5.4)
SODIUM SERPL-SCNC: 131 MMOL/L (ref 135–145)
T3FREE SERPL-MCNC: 2.5 PG/ML (ref 2–4.4)
WBC # BLD AUTO: 8.9 K/UL (ref 4.8–10.8)

## 2020-07-28 PROCEDURE — 36415 COLL VENOUS BLD VENIPUNCTURE: CPT

## 2020-07-28 PROCEDURE — 97161 PT EVAL LOW COMPLEX 20 MIN: CPT

## 2020-07-28 PROCEDURE — 700111 HCHG RX REV CODE 636 W/ 250 OVERRIDE (IP): Performed by: INTERNAL MEDICINE

## 2020-07-28 PROCEDURE — 700111 HCHG RX REV CODE 636 W/ 250 OVERRIDE (IP): Performed by: PSYCHIATRY & NEUROLOGY

## 2020-07-28 PROCEDURE — 80048 BASIC METABOLIC PNL TOTAL CA: CPT

## 2020-07-28 PROCEDURE — 85025 COMPLETE CBC W/AUTO DIFF WBC: CPT

## 2020-07-28 PROCEDURE — 99232 SBSQ HOSP IP/OBS MODERATE 35: CPT | Performed by: HOSPITALIST

## 2020-07-28 PROCEDURE — A9270 NON-COVERED ITEM OR SERVICE: HCPCS | Performed by: PSYCHIATRY & NEUROLOGY

## 2020-07-28 PROCEDURE — 700101 HCHG RX REV CODE 250: Performed by: PSYCHIATRY & NEUROLOGY

## 2020-07-28 PROCEDURE — A9270 NON-COVERED ITEM OR SERVICE: HCPCS | Performed by: INTERNAL MEDICINE

## 2020-07-28 PROCEDURE — 700102 HCHG RX REV CODE 250 W/ 637 OVERRIDE(OP): Performed by: INTERNAL MEDICINE

## 2020-07-28 PROCEDURE — 93888 INTRACRANIAL LIMITED STUDY: CPT

## 2020-07-28 PROCEDURE — 770006 HCHG ROOM/CARE - MED/SURG/GYN SEMI*

## 2020-07-28 PROCEDURE — 84481 FREE ASSAY (FT-3): CPT

## 2020-07-28 PROCEDURE — 700102 HCHG RX REV CODE 250 W/ 637 OVERRIDE(OP): Performed by: PSYCHIATRY & NEUROLOGY

## 2020-07-28 PROCEDURE — 93888 INTRACRANIAL LIMITED STUDY: CPT | Mod: 26 | Performed by: INTERNAL MEDICINE

## 2020-07-28 RX ADMIN — MORPHINE SULFATE 15 MG: 15 TABLET, FILM COATED, EXTENDED RELEASE ORAL at 04:22

## 2020-07-28 RX ADMIN — NIMODIPINE 60 MG: 30 CAPSULE ORAL at 20:24

## 2020-07-28 RX ADMIN — ACETAMINOPHEN 650 MG: 325 TABLET, FILM COATED ORAL at 11:52

## 2020-07-28 RX ADMIN — MORPHINE SULFATE 4 MG: 4 INJECTION INTRAVENOUS at 20:39

## 2020-07-28 RX ADMIN — DOCUSATE SODIUM 50 MG AND SENNOSIDES 8.6 MG 2 TABLET: 8.6; 5 TABLET, FILM COATED ORAL at 04:22

## 2020-07-28 RX ADMIN — AMLODIPINE BESYLATE 10 MG: 10 TABLET ORAL at 04:22

## 2020-07-28 RX ADMIN — HYDRALAZINE HYDROCHLORIDE 25 MG: 25 TABLET, FILM COATED ORAL at 20:24

## 2020-07-28 RX ADMIN — HEPARIN SODIUM 5000 UNITS: 5000 INJECTION, SOLUTION INTRAVENOUS; SUBCUTANEOUS at 20:24

## 2020-07-28 RX ADMIN — GABAPENTIN 300 MG: 300 CAPSULE ORAL at 17:34

## 2020-07-28 RX ADMIN — MORPHINE SULFATE 4 MG: 4 INJECTION INTRAVENOUS at 00:15

## 2020-07-28 RX ADMIN — MORPHINE SULFATE 4 MG: 4 INJECTION INTRAVENOUS at 12:30

## 2020-07-28 RX ADMIN — NIMODIPINE 60 MG: 30 CAPSULE ORAL at 10:43

## 2020-07-28 RX ADMIN — MORPHINE SULFATE 4 MG: 4 INJECTION INTRAVENOUS at 16:37

## 2020-07-28 RX ADMIN — LIDOCAINE 1 PATCH: 50 PATCH TOPICAL at 13:53

## 2020-07-28 RX ADMIN — GABAPENTIN 300 MG: 300 CAPSULE ORAL at 11:52

## 2020-07-28 RX ADMIN — GABAPENTIN 300 MG: 300 CAPSULE ORAL at 04:22

## 2020-07-28 RX ADMIN — NIMODIPINE 60 MG: 30 CAPSULE ORAL at 14:00

## 2020-07-28 RX ADMIN — ACETAMINOPHEN 650 MG: 325 TABLET, FILM COATED ORAL at 17:38

## 2020-07-28 RX ADMIN — Medication 1 G: at 11:52

## 2020-07-28 RX ADMIN — MORPHINE SULFATE 4 MG: 4 INJECTION INTRAVENOUS at 04:19

## 2020-07-28 RX ADMIN — HYDRALAZINE HYDROCHLORIDE 25 MG: 25 TABLET, FILM COATED ORAL at 13:53

## 2020-07-28 RX ADMIN — Medication 1 G: at 17:34

## 2020-07-28 RX ADMIN — ANTACID TABLETS 500 MG: 500 TABLET, CHEWABLE ORAL at 04:22

## 2020-07-28 RX ADMIN — HYDRALAZINE HYDROCHLORIDE 25 MG: 25 TABLET, FILM COATED ORAL at 04:23

## 2020-07-28 RX ADMIN — DOCUSATE SODIUM 50 MG AND SENNOSIDES 8.6 MG 2 TABLET: 8.6; 5 TABLET, FILM COATED ORAL at 17:34

## 2020-07-28 RX ADMIN — NIMODIPINE 60 MG: 30 CAPSULE ORAL at 06:05

## 2020-07-28 RX ADMIN — HEPARIN SODIUM 5000 UNITS: 5000 INJECTION, SOLUTION INTRAVENOUS; SUBCUTANEOUS at 04:22

## 2020-07-28 RX ADMIN — HEPARIN SODIUM 5000 UNITS: 5000 INJECTION, SOLUTION INTRAVENOUS; SUBCUTANEOUS at 13:53

## 2020-07-28 RX ADMIN — ACETAMINOPHEN 650 MG: 325 TABLET, FILM COATED ORAL at 23:31

## 2020-07-28 RX ADMIN — NIMODIPINE 60 MG: 30 CAPSULE ORAL at 17:34

## 2020-07-28 RX ADMIN — MORPHINE SULFATE 4 MG: 4 INJECTION INTRAVENOUS at 08:25

## 2020-07-28 RX ADMIN — Medication 1 G: at 08:25

## 2020-07-28 RX ADMIN — NIMODIPINE 60 MG: 30 CAPSULE ORAL at 02:19

## 2020-07-28 ASSESSMENT — ENCOUNTER SYMPTOMS
NECK PAIN: 0
BACK PAIN: 0
DIARRHEA: 0
DIAPHORESIS: 0
FOCAL WEAKNESS: 0
NERVOUS/ANXIOUS: 1
NAUSEA: 0
SPEECH CHANGE: 0
WHEEZING: 0
WEAKNESS: 1
FEVER: 0
SENSORY CHANGE: 0
VOMITING: 0
HEARTBURN: 0
MYALGIAS: 0
FLANK PAIN: 0
PALPITATIONS: 0
ABDOMINAL PAIN: 0
DIZZINESS: 0
CHILLS: 0
SHORTNESS OF BREATH: 0
BLURRED VISION: 0
HEADACHES: 1
COUGH: 0
SORE THROAT: 0

## 2020-07-28 ASSESSMENT — GAIT ASSESSMENTS
GAIT LEVEL OF ASSIST: MODIFIED INDEPENDENT
DEVIATION: STEP TO;OTHER (COMMENT)
ASSISTIVE DEVICE: FRONT WHEEL WALKER
DISTANCE (FEET): 80

## 2020-07-28 ASSESSMENT — COGNITIVE AND FUNCTIONAL STATUS - GENERAL
MOBILITY SCORE: 24
SUGGESTED CMS G CODE MODIFIER MOBILITY: CH

## 2020-07-28 NOTE — DISCHARGE PLANNING
Agency/Facility Name: [facility]  Spoke To: Jonah  Outcome: Reviewing referral this [facility] will call back at 1200.

## 2020-07-28 NOTE — DISCHARGE PLANNING
Agency[facility]Facility Name: [facility]  Referral sent per Choice form @ 2868     Approved Services $80 sent to [facility]@2100

## 2020-07-28 NOTE — FACE TO FACE
Face to Face Note  -  Durable Medical Equipment    Pepito Lewis M.D. - NPI: [id number]  I certify that this patient is under my care and that they had a durable medical equipment(DME)face to face encounter by myself that meets the physician DME face-to-face encounter requirements with this patient on:    Date of encounter:   Patient:                    MRN:                       YOB: 2021  Adalid Hurley  [id number]  1980     The encounter with the patient was in whole, or in part, for the following medical condition, which is the primary reason for durable medical equipment:  Other - PCOM Aneurysm s/p repair.     I certify that, based on my findings, the following durable medical equipment is medically necessary: Patient is 40 y.o. female admitted for PCOM aneurysm s/p repair. Pt presents today limited in functional mobility only by R groin pain, did well using FWW. Pt has good family support at discharge. Will need FWW at discharge if R groin pain persists. No further acute skilled PT needs.  Walkers.    HOME O2 Saturation Measurements:(Values must be present for Home Oxygen orders)         ,     ,         My Clinical findings support the need for the above equipment due to:  Abnormal Gait    Supporting Symptoms: see above

## 2020-07-28 NOTE — THERAPY
Physical Therapy   Initial Evaluation     Patient Name: Adalid Hurley  Age:  40 y.o., Sex:  female  Medical Record #: [id number]  Today's Date: 7/28/2020     Precautions: Fall Risk    Assessment  Patient is 40 y.o. female admitted for PCOM aneurysm s/p repair. Pt presents today limited in functional mobility only by R groin pain, did well using FWW. Pt has good family support at discharge. Will need FWW at discharge if R groin pain persists. No further acute skilled PT needs.    Plan    Recommend Physical Therapy for Evaluation only.    Discharge recommendations:  Anticipate that the patient will have no further physical therapy needs after discharge from the hospital.      Subjective    Pt agrees to PT.     Objective       07/28/20 0923   Prior Living Situation   Prior Services Home-Independent   Housing / Facility 2 Story House   Equipment Owned None   Lives with - Patient's Self Care Capacity Other (Comments);Spouse   Comments Pt will be staying at her sister's home at discharge.   Prior Level of Functional Mobility   Bed Mobility Independent   Transfer Status Independent   Ambulation Independent   Assistive Devices Used None   Stairs Independent   Cognition    Cognition / Consciousness WDL   Level of Consciousness Alert   Passive ROM Lower Body   Passive ROM Lower Body WDL   Active ROM Lower Body    Active ROM Lower Body  WDL   Strength Lower Body   Lower Body Strength  WDL   Sensation Lower Body   Lower Extremity Sensation   WDL   Lower Body Muscle Tone   Lower Body Muscle Tone  WDL   Coordination Lower Body    Coordination Lower Body  WDL   Balance Assessment   Standing Balance (Static) Good   Standing Balance (Dynamic) Good   Weight Shift Standing Good   Gait Analysis   Gait Level Of Assist Modified Independent   Assistive Device Front Wheel Walker   Distance (Feet) 80   Deviation Step To;Other (Comment)  (To de-weight R LE for pain management)   Weight Bearing Status FWB   Skilled Intervention  Compensatory Strategies;Verbal Cuing;Sequencing   Bed Mobility    Supine to Sit Independent   Scooting Independent   Skilled Intervention Verbal Cuing   Functional Mobility   Sit to Stand Supervised   Bed, Chair, Wheelchair Transfer Supervised   Mobility gait in garcia   Patient / Family Goals    Patient / Family Goal #1 To her sister's home soon.   Problem List    Problems Pain   Anticipated Discharge Equipment   DC Equipment Front-Wheel Walker

## 2020-07-28 NOTE — DISCHARGE PLANNING
Agency/Facility Name: [facility]  Spoke To: Pau  Outcome: Manually re faxed Approved Services.    Agency[facility]Facility Name: [facility]  Outcome: This [facility] couldn't get a hold of intake, sent referral to [facility].

## 2020-07-28 NOTE — CARE PLAN
Problem: Fluid Volume:  Goal: Will maintain balanced intake and output  Outcome: PROGRESSING AS EXPECTED     Problem: Safety  Goal: Will remain free from injury  Outcome: PROGRESSING AS EXPECTED  Goal: Will remain free from falls  Outcome: PROGRESSING AS EXPECTED     Problem: Skin Integrity  Goal: Risk for impaired skin integrity will decrease  Outcome: PROGRESSING AS EXPECTED     Problem: Respiratory:  Goal: Respiratory status will improve  Outcome: PROGRESSING AS EXPECTED     Problem: Pain Management  Goal: Pain level will decrease to patient's comfort goal  Outcome: PROGRESSING SLOWER THAN EXPECTED

## 2020-07-28 NOTE — DISCHARGE PLANNING
Anticipated Discharge Disposition: TBD    Action: Per [place] notes, FWW would be needed. Lsw faxed choice and approved services to [facility], no payor source.     Barriers to Discharge: Disposition needs unknown at moment    Plan: Lsw to assist as needed

## 2020-07-28 NOTE — DISCHARGE PLANNING
Received Choice form at 1024  Agency/Facility Name: [facility]  Referral sent per Choice form @ 0849     Manually faxed over [facility] Services for $75 for FWW.

## 2020-07-28 NOTE — CARE PLAN
Pt A+Ox4. Cont to C/O pain to R eye, treated with PRN IV morphine and scheduled MS Contin. Intracranial US done showing no evidence of vasospasms. Tolerated PT/OT well.

## 2020-07-29 ENCOUNTER — APPOINTMENT (OUTPATIENT)
Dept: RADIOLOGY | Facility: MEDICAL CENTER | Age: 40
DRG: 021 | End: 2020-07-29
Attending: PSYCHIATRY & NEUROLOGY

## 2020-07-29 PROBLEM — M62.838 MUSCLE SPASM: Status: ACTIVE | Noted: 2020-07-29

## 2020-07-29 LAB
ANION GAP SERPL CALC-SCNC: 14 MMOL/L (ref 7–16)
BUN SERPL-MCNC: 12 MG/DL (ref 8–22)
CALCIUM SERPL-MCNC: 9.3 MG/DL (ref 8.5–10.5)
CHLORIDE SERPL-SCNC: 96 MMOL/L (ref 96–112)
CO2 SERPL-SCNC: 23 MMOL/L (ref 20–33)
CREAT SERPL-MCNC: 1.17 MG/DL (ref 0.5–1.4)
GLUCOSE SERPL-MCNC: 96 MG/DL (ref 65–99)
POTASSIUM SERPL-SCNC: 4.6 MMOL/L (ref 3.6–5.5)
SODIUM SERPL-SCNC: 133 MMOL/L (ref 135–145)

## 2020-07-29 PROCEDURE — 99233 SBSQ HOSP IP/OBS HIGH 50: CPT | Performed by: HOSPITALIST

## 2020-07-29 PROCEDURE — 700111 HCHG RX REV CODE 636 W/ 250 OVERRIDE (IP): Performed by: PSYCHIATRY & NEUROLOGY

## 2020-07-29 PROCEDURE — A9270 NON-COVERED ITEM OR SERVICE: HCPCS | Performed by: PSYCHIATRY & NEUROLOGY

## 2020-07-29 PROCEDURE — 93888 INTRACRANIAL LIMITED STUDY: CPT | Mod: 26 | Performed by: INTERNAL MEDICINE

## 2020-07-29 PROCEDURE — 770006 HCHG ROOM/CARE - MED/SURG/GYN SEMI*

## 2020-07-29 PROCEDURE — 700102 HCHG RX REV CODE 250 W/ 637 OVERRIDE(OP): Performed by: HOSPITALIST

## 2020-07-29 PROCEDURE — 93888 INTRACRANIAL LIMITED STUDY: CPT

## 2020-07-29 PROCEDURE — 700102 HCHG RX REV CODE 250 W/ 637 OVERRIDE(OP): Performed by: PSYCHIATRY & NEUROLOGY

## 2020-07-29 PROCEDURE — 700102 HCHG RX REV CODE 250 W/ 637 OVERRIDE(OP): Performed by: INTERNAL MEDICINE

## 2020-07-29 PROCEDURE — 700101 HCHG RX REV CODE 250: Performed by: PSYCHIATRY & NEUROLOGY

## 2020-07-29 PROCEDURE — A9270 NON-COVERED ITEM OR SERVICE: HCPCS | Performed by: HOSPITALIST

## 2020-07-29 PROCEDURE — 36415 COLL VENOUS BLD VENIPUNCTURE: CPT

## 2020-07-29 PROCEDURE — A9270 NON-COVERED ITEM OR SERVICE: HCPCS | Performed by: INTERNAL MEDICINE

## 2020-07-29 PROCEDURE — 80048 BASIC METABOLIC PNL TOTAL CA: CPT

## 2020-07-29 PROCEDURE — 700111 HCHG RX REV CODE 636 W/ 250 OVERRIDE (IP): Performed by: INTERNAL MEDICINE

## 2020-07-29 RX ORDER — OXYCODONE AND ACETAMINOPHEN 10; 325 MG/1; MG/1
1 TABLET ORAL EVERY 4 HOURS PRN
Status: DISCONTINUED | OUTPATIENT
Start: 2020-07-29 | End: 2020-08-03 | Stop reason: HOSPADM

## 2020-07-29 RX ORDER — METOPROLOL SUCCINATE 25 MG/1
25 TABLET, EXTENDED RELEASE ORAL DAILY
Status: DISCONTINUED | OUTPATIENT
Start: 2020-07-29 | End: 2020-08-03 | Stop reason: HOSPADM

## 2020-07-29 RX ORDER — CITALOPRAM 20 MG/1
40 TABLET ORAL DAILY
Status: DISCONTINUED | OUTPATIENT
Start: 2020-07-29 | End: 2020-08-03 | Stop reason: HOSPADM

## 2020-07-29 RX ORDER — OXYCODONE AND ACETAMINOPHEN 10; 325 MG/1; MG/1
1 TABLET ORAL EVERY 4 HOURS PRN
Status: DISCONTINUED | OUTPATIENT
Start: 2020-07-29 | End: 2020-07-29

## 2020-07-29 RX ORDER — HYDROCHLOROTHIAZIDE 25 MG/1
25 TABLET ORAL DAILY
Status: DISCONTINUED | OUTPATIENT
Start: 2020-07-29 | End: 2020-08-03 | Stop reason: HOSPADM

## 2020-07-29 RX ORDER — CYCLOBENZAPRINE HCL 10 MG
10 TABLET ORAL 3 TIMES DAILY PRN
Status: DISCONTINUED | OUTPATIENT
Start: 2020-07-29 | End: 2020-08-03 | Stop reason: HOSPADM

## 2020-07-29 RX ORDER — ALPRAZOLAM 0.5 MG/1
0.5 TABLET ORAL 3 TIMES DAILY PRN
Status: DISCONTINUED | OUTPATIENT
Start: 2020-07-29 | End: 2020-08-03 | Stop reason: HOSPADM

## 2020-07-29 RX ADMIN — CYCLOBENZAPRINE 10 MG: 10 TABLET, FILM COATED ORAL at 16:30

## 2020-07-29 RX ADMIN — HYDROCHLOROTHIAZIDE 25 MG: 25 TABLET ORAL at 16:29

## 2020-07-29 RX ADMIN — HYDRALAZINE HYDROCHLORIDE 25 MG: 25 TABLET, FILM COATED ORAL at 13:13

## 2020-07-29 RX ADMIN — Medication 1 G: at 10:15

## 2020-07-29 RX ADMIN — OXYCODONE HYDROCHLORIDE AND ACETAMINOPHEN 1 TABLET: 10; 325 TABLET ORAL at 14:27

## 2020-07-29 RX ADMIN — HEPARIN SODIUM 5000 UNITS: 5000 INJECTION, SOLUTION INTRAVENOUS; SUBCUTANEOUS at 21:38

## 2020-07-29 RX ADMIN — OXYCODONE HYDROCHLORIDE AND ACETAMINOPHEN 1 TABLET: 10; 325 TABLET ORAL at 18:05

## 2020-07-29 RX ADMIN — Medication 1 G: at 07:41

## 2020-07-29 RX ADMIN — MORPHINE SULFATE 4 MG: 4 INJECTION INTRAVENOUS at 20:07

## 2020-07-29 RX ADMIN — Medication 1 G: at 16:30

## 2020-07-29 RX ADMIN — MORPHINE SULFATE 4 MG: 4 INJECTION INTRAVENOUS at 13:17

## 2020-07-29 RX ADMIN — AMLODIPINE BESYLATE 10 MG: 10 TABLET ORAL at 04:40

## 2020-07-29 RX ADMIN — NIMODIPINE 60 MG: 30 CAPSULE ORAL at 08:37

## 2020-07-29 RX ADMIN — CITALOPRAM HYDROBROMIDE 40 MG: 20 TABLET ORAL at 16:29

## 2020-07-29 RX ADMIN — NIMODIPINE 60 MG: 30 CAPSULE ORAL at 01:51

## 2020-07-29 RX ADMIN — LIDOCAINE 1 PATCH: 50 PATCH TOPICAL at 13:12

## 2020-07-29 RX ADMIN — NIMODIPINE 60 MG: 30 CAPSULE ORAL at 18:05

## 2020-07-29 RX ADMIN — HYDRALAZINE HYDROCHLORIDE 25 MG: 25 TABLET, FILM COATED ORAL at 04:40

## 2020-07-29 RX ADMIN — DOCUSATE SODIUM 50 MG AND SENNOSIDES 8.6 MG 2 TABLET: 8.6; 5 TABLET, FILM COATED ORAL at 16:31

## 2020-07-29 RX ADMIN — GABAPENTIN 300 MG: 300 CAPSULE ORAL at 16:30

## 2020-07-29 RX ADMIN — GABAPENTIN 300 MG: 300 CAPSULE ORAL at 04:40

## 2020-07-29 RX ADMIN — GABAPENTIN 300 MG: 300 CAPSULE ORAL at 11:48

## 2020-07-29 RX ADMIN — HYDRALAZINE HYDROCHLORIDE 25 MG: 25 TABLET, FILM COATED ORAL at 21:38

## 2020-07-29 RX ADMIN — DOCUSATE SODIUM 50 MG AND SENNOSIDES 8.6 MG 2 TABLET: 8.6; 5 TABLET, FILM COATED ORAL at 04:40

## 2020-07-29 RX ADMIN — NIMODIPINE 60 MG: 30 CAPSULE ORAL at 04:40

## 2020-07-29 RX ADMIN — METOPROLOL SUCCINATE 25 MG: 25 TABLET, EXTENDED RELEASE ORAL at 16:29

## 2020-07-29 RX ADMIN — NIMODIPINE 60 MG: 30 CAPSULE ORAL at 13:13

## 2020-07-29 RX ADMIN — MORPHINE SULFATE 4 MG: 4 INJECTION INTRAVENOUS at 04:40

## 2020-07-29 RX ADMIN — NIMODIPINE 60 MG: 30 CAPSULE ORAL at 21:38

## 2020-07-29 RX ADMIN — MORPHINE SULFATE 4 MG: 4 INJECTION INTRAVENOUS at 08:37

## 2020-07-29 RX ADMIN — HEPARIN SODIUM 5000 UNITS: 5000 INJECTION, SOLUTION INTRAVENOUS; SUBCUTANEOUS at 13:16

## 2020-07-29 RX ADMIN — HEPARIN SODIUM 5000 UNITS: 5000 INJECTION, SOLUTION INTRAVENOUS; SUBCUTANEOUS at 04:40

## 2020-07-29 RX ADMIN — MORPHINE SULFATE 4 MG: 4 INJECTION INTRAVENOUS at 00:39

## 2020-07-29 RX ADMIN — OXYCODONE HYDROCHLORIDE AND ACETAMINOPHEN 1 TABLET: 10; 325 TABLET ORAL at 10:15

## 2020-07-29 RX ADMIN — ANTACID TABLETS 500 MG: 500 TABLET, CHEWABLE ORAL at 04:40

## 2020-07-29 RX ADMIN — ACETAMINOPHEN 650 MG: 325 TABLET, FILM COATED ORAL at 05:43

## 2020-07-29 ASSESSMENT — ENCOUNTER SYMPTOMS
NAUSEA: 0
BLURRED VISION: 0
SORE THROAT: 0
WHEEZING: 0
DIAPHORESIS: 0
CHILLS: 0
MYALGIAS: 0
VOMITING: 0
COUGH: 0
HEADACHES: 1
NERVOUS/ANXIOUS: 1
NECK PAIN: 0
FEVER: 0
BACK PAIN: 0
SHORTNESS OF BREATH: 0
DIZZINESS: 0
SENSORY CHANGE: 0
SPEECH CHANGE: 0
ABDOMINAL PAIN: 0
WEAKNESS: 1
FOCAL WEAKNESS: 0
PALPITATIONS: 0
DIARRHEA: 0
HEARTBURN: 0
FLANK PAIN: 0

## 2020-07-29 ASSESSMENT — PATIENT HEALTH QUESTIONNAIRE - PHQ9
2. FEELING DOWN, DEPRESSED, IRRITABLE, OR HOPELESS: NOT AT ALL
SUM OF ALL RESPONSES TO PHQ9 QUESTIONS 1 AND 2: 0
1. LITTLE INTEREST OR PLEASURE IN DOING THINGS: NOT AT ALL

## 2020-07-29 NOTE — PROGRESS NOTES
Hospital Medicine Daily Progress Note    Date of Service  7/28/2020    Chief Complaint  40 y.o. female admitted 7/20/2020 with SAH.    Hospital Course  40 y.o. 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  Interventional radiology was called on the case as well as neurosurgery, patient underwent endovascular repair of the PCOM aneurysm.  Repeat CT scan shows no increased hemorrhage.  TCD's have been negative so far.  Patient with known history of hypertension, admits to noncompliance with taking her medications.  Hypertension currently well controlled.  Care and neurosurgery have cleared her for transfer from the ICU.    Interval Problem Update  No acute overnight events, patient describes some pain more in her back region rather than headache today.    Consultants/Specialty  Critical care  Neurosurgery    Code Status  Full    Disposition  Likely stable for home with family following daily transcranial Dopplers until 7/31/2020.    Review of Systems  Review of Systems   Constitutional: Negative for chills, diaphoresis, fever and malaise/fatigue.   HENT: Negative for congestion, hearing loss and sore throat.    Eyes: Negative for blurred vision.   Respiratory: Negative for cough, shortness of breath and wheezing.    Cardiovascular: Negative for chest pain, palpitations and leg swelling.   Gastrointestinal: Negative for abdominal pain, diarrhea, heartburn, nausea and vomiting.   Genitourinary: Negative for dysuria, flank pain and hematuria.   Musculoskeletal: Negative for back pain, joint pain, myalgias and neck pain.   Skin: Negative for rash.   Neurological: Positive for weakness and headaches. Negative for dizziness, sensory change, speech change and focal weakness.   Psychiatric/Behavioral: The patient is nervous/anxious.         Physical Exam  Temp:  [36.6 °C (97.8 °F)-36.9 °C (98.4 °F)] 36.9 °C (98.4 °F)  Pulse:  [67-84] 67  Resp:  [16-17] 16  BP: (118-134)/(67-83)  121/70  SpO2:  [99 %-100 %] 100 %    Physical Exam  Vitals signs and nursing note reviewed.   HENT:      Head: Normocephalic and atraumatic.      Nose: No congestion.      Mouth/Throat:      Mouth: Mucous membranes are moist.   Eyes:      Extraocular Movements: Extraocular movements intact.      Conjunctiva/sclera: Conjunctivae normal.      Pupils: Pupils are equal, round, and reactive to light.   Neck:      Musculoskeletal: No muscular tenderness.   Cardiovascular:      Rate and Rhythm: Normal rate and regular rhythm.   Pulmonary:      Effort: Pulmonary effort is normal.      Breath sounds: Normal breath sounds.   Abdominal:      General: Bowel sounds are normal. There is no distension.      Palpations: Abdomen is soft.      Tenderness: There is no abdominal tenderness. There is no guarding or rebound.   Musculoskeletal:      Right lower leg: No edema.      Left lower leg: No edema.   Lymphadenopathy:      Cervical: No cervical adenopathy.   Skin:     General: Skin is warm and dry.   Neurological:      General: No focal deficit present.      Mental Status: She is alert and oriented to person, place, and time.      Cranial Nerves: No cranial nerve deficit.         Fluids    Intake/Output Summary (Last 24 hours) at 7/28/2020 1709  Last data filed at 7/28/2020 1300  Gross per 24 hour   Intake 240 ml   Output --   Net 240 ml       Laboratory  Recent Labs     07/27/20  0741 07/28/20  0702   WBC 11.5* 8.9   RBC 4.74 4.88   HEMOGLOBIN 13.3 13.5   HEMATOCRIT 42.4 42.3   MCV 89.5 86.7   MCH 28.1 27.7   MCHC 31.4* 31.9*   RDW 49.0 48.3   PLATELETCT 213 224   MPV 11.4 11.6     Recent Labs     07/26/20  0520 07/26/20  1130 07/27/20  0741 07/28/20  0702   SODIUM 127* 131* 133* 131*   POTASSIUM 5.2  --  4.7 4.4   CHLORIDE 92*  --  96 93*   CO2 25  --  25 25   GLUCOSE 99  --  92 112*   BUN 18  --  16 14   CREATININE 1.24  --  1.06 1.02   CALCIUM 9.5  --  9.4 9.5                   Imaging  US-INTRACRANIAL ARTERY LIMITED   Final  Result      US-INTRACRANIAL ARTERY LIMITED   Final Result      US-INTRACRANIAL ARTERY LIMITED   Final Result      US-INTRACRANIAL ARTERY LIMITED   Final Result      US-INTRACRANIAL ARTERY LIMITED   Final Result      US-INTRACRANIAL ARTERY LIMITED   Final Result      US-INTRACRANIAL ARTERY LIMITED   Final Result      CT-HEAD W/O   Final Result      1.  No CT evidence of acute infarct, hemorrhage or mass.   2.  Right suprasellar aneurysm coil.      EC-ECHOCARDIOGRAM COMPLETE W/O CONT   Final Result      US-INTRACRANIAL ARTERY COMP   Final Result      IR-EMBOLIZATION   Final Result   Addendum 1 of 1   Addendum:      Used coils:   First coil:[employer] Soft 10 Endovascular Embolization Coil 3D    2.3stn9ia placed in the Right PCOM. Ref 3887-7695. Lot [id number]   ?   Second coil:   [employer] Hydro Soft 10 Endovascular Embolization Coil 3D 1.6imy0mu    placed in the Right PCOM. Ref 6765-3894. Lot [id number]      coil which is not used:      Hydrocoil 10 [id number] LOT no:[id number]      Final      CT-CTA HEAD WITH & W/O-POST PROCESS   Final Result         1.  3.1 mm right aneurysm, likely from the supraclinoid segment.   2.  Right sylvian fissure, frontal, and bilateral basal cistern, appearance compatible subarachnoid hemorrhage.      These findings were discussed with the patient's clinician, Daniel Leiva, on 7/20/2020 9:51 PM.      CT-CTA NECK WITH & W/O-POST PROCESSING   Final Result         1.  CT angiogram of the neck within normal limits.              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)- (present on admission)  Assessment &  Plan  Follow bmp  UA pending    Hyponatremia- (present on admission)  Assessment & Plan  salt tabs  Follow bmp    Noncompliance- (present on admission)  Assessment & Plan  Counseling    Hyperkalemia  Assessment & Plan  Follow BMP  Hold lisinopril    Headache- (present on admission)  Assessment & Plan  Pain control    Pain, chronic- (present on admission)  Assessment & Plan  Pain control    HTN (hypertension)- (present on admission)  Assessment & Plan  Norvasc, hydralazine  IV labetalol as needed with parameters       VTE prophylaxis: Heparin

## 2020-07-29 NOTE — PROGRESS NOTES
Hospital Medicine Daily Progress Note    Date of Service  7/29/2020    Chief Complaint  40 y.o. female admitted 7/20/2020 with SAH.    Hospital Course  40 y.o. 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  Interventional radiology was called on the case as well as neurosurgery, patient underwent endovascular repair of the PCOM aneurysm.  Repeat CT scan shows no increased hemorrhage.  TCD's have been negative so far.  Patient with known history of hypertension, admits to noncompliance with taking her medications.  Hypertension currently well controlled.  Care and neurosurgery have cleared her for transfer from the ICU.    Interval Problem Update  Patient reports that her back pain was actually increased significantly overnight, more of a spastic component than a muscle pain component.  Continues to deny any significant headache.  Has had worsening of her anxiety, is frequently tearful, tells me that this is been treated with Xanax in the past.    Consultants/Specialty  Critical care  Neurosurgery    Code Status  Full    Disposition  Likely stable for home with family following daily transcranial Dopplers until 7/31/2020.    Review of Systems  Review of Systems   Constitutional: Negative for chills, diaphoresis, fever and malaise/fatigue.   HENT: Negative for congestion, hearing loss and sore throat.    Eyes: Negative for blurred vision.   Respiratory: Negative for cough, shortness of breath and wheezing.    Cardiovascular: Negative for chest pain, palpitations and leg swelling.   Gastrointestinal: Negative for abdominal pain, diarrhea, heartburn, nausea and vomiting.   Genitourinary: Negative for dysuria, flank pain and hematuria.   Musculoskeletal: Negative for back pain, joint pain, myalgias and neck pain.   Skin: Negative for rash.   Neurological: Positive for weakness and headaches. Negative for dizziness, sensory change, speech change and focal weakness.    Psychiatric/Behavioral: The patient is nervous/anxious.         Physical Exam  Temp:  [36.2 °C (97.1 °F)-37.2 °C (98.9 °F)] (P) 37.1 °C (98.7 °F)  Pulse:  [] (P) 112  Resp:  [16-19] (P) 19  BP: (111-125)/(60-79) (P) 127/68  SpO2:  [93 %-96 %] 94 %    Physical Exam  Vitals signs and nursing note reviewed.   HENT:      Head: Normocephalic and atraumatic.      Nose: No congestion.      Mouth/Throat:      Mouth: Mucous membranes are moist.   Eyes:      Extraocular Movements: Extraocular movements intact.      Conjunctiva/sclera: Conjunctivae normal.      Pupils: Pupils are equal, round, and reactive to light.   Neck:      Musculoskeletal: No muscular tenderness.   Cardiovascular:      Rate and Rhythm: Normal rate and regular rhythm.   Pulmonary:      Effort: Pulmonary effort is normal.      Breath sounds: Normal breath sounds.   Abdominal:      General: Bowel sounds are normal. There is no distension.      Palpations: Abdomen is soft.      Tenderness: There is no abdominal tenderness. There is no guarding or rebound.   Musculoskeletal:      Right lower leg: No edema.      Left lower leg: No edema.   Lymphadenopathy:      Cervical: No cervical adenopathy.   Skin:     General: Skin is warm and dry.   Neurological:      General: No focal deficit present.      Mental Status: She is alert and oriented to person, place, and time.      Cranial Nerves: No cranial nerve deficit.         Current Facility-Administered Medications:   •  oxyCODONE-acetaminophen (PERCOCET-10)  MG per tablet 1 Tab, 1 Tab, Oral, Q4HRS PRN, Pepito Lewis M.D., 1 Tab at 07/29/20 1427  •  citalopram (CELEXA) tablet 40 mg, 40 mg, Oral, DAILY, Pepito Lewis M.D.  •  hydroCHLOROthiazide (HYDRODIURIL) tablet 25 mg, 25 mg, Oral, DAILY, Pepito Lewis M.D.  •  metoprolol SR (TOPROL XL) tablet 25 mg, 25 mg, Oral, DAILY, Pepito Lewis M.D.  •  cyclobenzaprine (FLEXERIL) tablet 10 mg, 10 mg, Oral, TID PRN, Pepito Lewis M.D.  •  Yeimilam  (XANAX) tablet 0.5 mg, 0.5 mg, Oral, TID PRN, Pepito Lewis M.D.  •  morphine (pf) 4 MG/ML injection 4 mg, 4 mg, Intravenous, Q4HRS PRN, Mingo Velasquez M.D., 4 mg at 07/29/20 1317  •  hydrALAZINE (APRESOLINE) tablet 25 mg, 25 mg, Oral, Q8HRS, Mingo Velasquez M.D., 25 mg at 07/29/20 1313  •  sodium chloride (SALT) tablet 1 g, 1 g, Oral, TID WITH MEALS, Mingo Velasquez M.D., 1 g at 07/29/20 1015  •  calcium carbonate (TUMS) chewable tab 500 mg, 500 mg, Oral, DAILY, Norm Marie M.D., 500 mg at 07/29/20 0440  •  heparin injection 5,000 Units, 5,000 Units, Subcutaneous, Q8HRS, Cristiano Carlisle M.D., 5,000 Units at 07/29/20 1316  •  albuterol inhaler 2 Puff, 2 Puff, Inhalation, Q4H PRN (RT), Norm Marie M.D.  •  amLODIPine (NORVASC) tablet 10 mg, 10 mg, Oral, DAILY, Cristiano Carlisle M.D., 10 mg at 07/29/20 0440  •  lidocaine (LIDODERM) 5 % 1 Patch, 1 Patch, Transdermal, Q24HR, Cristiano Carlisle M.D., 1 Patch at 07/29/20 1312  •  gabapentin (NEURONTIN) capsule 300 mg, 300 mg, Oral, TID, Cristiano Carlisle M.D., 300 mg at 07/29/20 1148  •  senna-docusate (PERICOLACE or SENOKOT S) 8.6-50 MG per tablet 2 Tab, 2 Tab, Oral, BID, 2 Tab at 07/29/20 0440 **AND** polyethylene glycol/lytes (MIRALAX) PACKET 1 Packet, 1 Packet, Oral, QDAY PRN, 1 Packet at 07/27/20 2109 **AND** magnesium hydroxide (MILK OF MAGNESIA) suspension 30 mL, 30 mL, Oral, QDAY PRN **AND** bisacodyl (DULCOLAX) suppository 10 mg, 10 mg, Rectal, QDAY PRN, Norm Marie M.D.  •  Respiratory Therapy Consult, , Nebulization, Continuous RT, Norm Marie M.D.  •  acetaminophen (TYLENOL) tablet 650 mg, 650 mg, Oral, Q6HRS PRN, Norm Marie M.D., 650 mg at 07/29/20 0543  •  labetalol (NORMODYNE/TRANDATE) injection 10 mg, 10 mg, Intravenous, Q4HRS PRN, Norm Marie M.D., 10 mg at 07/21/20 0204  •  ondansetron (ZOFRAN) syringe/vial injection 4 mg, 4 mg, Intravenous, Q4HRS PRN, Norm Marie M.D.  •  ondansetron (ZOFRAN ODT) dispertab 4 mg, 4 mg, Oral,  Q4HRS PRN, Norm Marie M.D.  •  promethazine (PHENERGAN) tablet 12.5-25 mg, 12.5-25 mg, Oral, Q4HRS PRN, Norm Marie M.D.  •  promethazine (PHENERGAN) suppository 12.5-25 mg, 12.5-25 mg, Rectal, Q4HRS PRN, Norm Marie M.D.  •  prochlorperazine (COMPAZINE) injection 5-10 mg, 5-10 mg, Intravenous, Q4HRS PRN, Norm Marie M.D.  •  niMODipine (NIMOTOP) capsule 60 mg, 60 mg, Oral, Q4HRS, Cristiano Carlisle M.D., 60 mg at 07/29/20 1313      Fluids    Intake/Output Summary (Last 24 hours) at 7/29/2020 1615  Last data filed at 7/29/2020 1200  Gross per 24 hour   Intake 750 ml   Output --   Net 750 ml       Laboratory  Recent Labs     07/27/20  0741 07/28/20  0702   WBC 11.5* 8.9   RBC 4.74 4.88   HEMOGLOBIN 13.3 13.5   HEMATOCRIT 42.4 42.3   MCV 89.5 86.7   MCH 28.1 27.7   MCHC 31.4* 31.9*   RDW 49.0 48.3   PLATELETCT 213 224   MPV 11.4 11.6     Recent Labs     07/27/20  0741 07/28/20  0702 07/29/20  0405   SODIUM 133* 131* 133*   POTASSIUM 4.7 4.4 4.6   CHLORIDE 96 93* 96   CO2 25 25 23   GLUCOSE 92 112* 96   BUN 16 14 12   CREATININE 1.06 1.02 1.17   CALCIUM 9.4 9.5 9.3                   Imaging  US-INTRACRANIAL ARTERY LIMITED   Final Result      US-INTRACRANIAL ARTERY LIMITED   Final Result      US-INTRACRANIAL ARTERY LIMITED   Final Result      US-INTRACRANIAL ARTERY LIMITED   Final Result      US-INTRACRANIAL ARTERY LIMITED   Final Result      US-INTRACRANIAL ARTERY LIMITED   Final Result      US-INTRACRANIAL ARTERY LIMITED   Final Result      US-INTRACRANIAL ARTERY LIMITED   Final Result      CT-HEAD W/O   Final Result      1.  No CT evidence of acute infarct, hemorrhage or mass.   2.  Right suprasellar aneurysm coil.      EC-ECHOCARDIOGRAM COMPLETE W/O CONT   Final Result      US-INTRACRANIAL ARTERY COMP   Final Result      IR-EMBOLIZATION   Final Result   Addendum 1 of 1   Addendum:      Used coils:   First coil:[employer] [place] Soft 10 Endovascular Embolization Coil 3D    [id number] placed in the  Right PCOM. Ref [id number]. Lot [id number]   ?   Second coil:   [employer] [place] Soft 10 Endovascular Embolization Coil 3D [id number]    placed in the Right PCOM. Ref [phone]. Lot [id number]      coil which is not used:      Hydrocoil 10 [id number] LOT no:[id number]      Final      CT-CTA HEAD WITH & W/O-POST PROCESS   Final Result         1.  3.1 mm right aneurysm, likely from the supraclinoid segment.   2.  Right sylvian fissure, frontal, and bilateral basal cistern, appearance compatible subarachnoid hemorrhage.      These findings were discussed with the patient's clinician, Daniel Leiva, on 7/20/2020 9:51 PM.      CT-CTA NECK WITH & W/O-POST PROCESSING   Final Result         1.  CT angiogram of the neck within normal limits.              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  Nimodipine  TCD daily until 7/31/2020    Positive urine drug screen- (present on admission)  Assessment & Plan  UDS + for cocaine, THC    Low TSH level- (present on admission)  Assessment & Plan  Check FT3    RODO (acute kidney injury) (HCC)- (present on admission)  Assessment & Plan  Follow bmp  UA pending    Hyponatremia- (present on admission)  Assessment & Plan  salt tabs  Follow bmp    Noncompliance- (present on admission)  Assessment & Plan  Counseling    Hyperkalemia  Assessment & Plan  Follow BMP  Hold lisinopril    Headache- (present on admission)  Assessment & Plan  Pain control    Pain, chronic- (present on admission)  Assessment & Plan  Pain control    HTN (hypertension)- (present on admission)  Assessment & Plan  Norvasc, hydralazine  IV labetalol as needed with parameters    Muscle spasm  Assessment & Plan  Flexeril, low-dose benzodiazepine as needed       VTE prophylaxis: Heparin

## 2020-07-29 NOTE — CARE PLAN
Problem: Pain Management  Goal: Pain level will decrease to patient's comfort goal  Outcome: PROGRESSING AS EXPECTED  Note: Pt educated on pain regimen, comfort measures given, pt educated on calling staff prior to pain getting out of control      Problem: Psychosocial Needs:  Goal: Level of anxiety will decrease  Outcome: PROGRESSING AS EXPECTED     Problem: Communication  Goal: The ability to communicate needs accurately and effectively will improve  Outcome: PROGRESSING AS EXPECTED     Problem: Safety  Goal: Will remain free from injury  Outcome: PROGRESSING AS EXPECTED  Goal: Will remain free from falls  Outcome: PROGRESSING AS EXPECTED  Intervention: Implement fall precautions  Note: Bed alarm on and educated on calling staff for needs and to get out of bed

## 2020-07-29 NOTE — CARE PLAN
Problem: Pain Management  Goal: Pain level will decrease to patient's comfort goal  Outcome: PROGRESSING AS EXPECTED     Problem: Fluid Volume:  Goal: Will maintain balanced intake and output  Outcome: PROGRESSING AS EXPECTED     Problem: Psychosocial Needs:  Goal: Level of anxiety will decrease  Outcome: PROGRESSING AS EXPECTED     Problem: Communication  Goal: The ability to communicate needs accurately and effectively will improve  Outcome: PROGRESSING AS EXPECTED     Problem: Safety  Goal: Will remain free from injury  Outcome: PROGRESSING AS EXPECTED  Goal: Will remain free from falls  Outcome: PROGRESSING AS EXPECTED     Problem: Infection  Goal: Will remain free from infection  Outcome: PROGRESSING AS EXPECTED     Problem: Venous Thromboembolism (VTW)/Deep Vein Thrombosis (DVT) Prevention:  Goal: Patient will participate in Venous Thrombosis (VTE)/Deep Vein Thrombosis (DVT)Prevention Measures  Outcome: PROGRESSING AS EXPECTED     Problem: Bowel/Gastric:  Goal: Normal bowel function is maintained or improved  Outcome: PROGRESSING AS EXPECTED  Goal: Will not experience complications related to bowel motility  Outcome: PROGRESSING AS EXPECTED     Problem: Knowledge Deficit  Goal: Knowledge of disease process/condition, treatment plan, diagnostic tests, and medications will improve  Outcome: PROGRESSING AS EXPECTED  Goal: Knowledge of the prescribed therapeutic regimen will improve  Outcome: PROGRESSING AS EXPECTED     Problem: Discharge Barriers/Planning  Goal: Patient's continuum of care needs will be met  Outcome: PROGRESSING AS EXPECTED     Problem: Skin Integrity  Goal: Risk for impaired skin integrity will decrease  Outcome: PROGRESSING AS EXPECTED     Problem: Respiratory:  Goal: Respiratory status will improve  Outcome: PROGRESSING AS EXPECTED

## 2020-07-30 ENCOUNTER — APPOINTMENT (OUTPATIENT)
Dept: RADIOLOGY | Facility: MEDICAL CENTER | Age: 40
DRG: 021 | End: 2020-07-30
Attending: PSYCHIATRY & NEUROLOGY

## 2020-07-30 PROBLEM — E87.5 HYPERKALEMIA: Status: RESOLVED | Noted: 2020-07-25 | Resolved: 2020-07-30

## 2020-07-30 PROBLEM — N17.9 AKI (ACUTE KIDNEY INJURY) (HCC): Status: RESOLVED | Noted: 2020-07-26 | Resolved: 2020-07-30

## 2020-07-30 PROBLEM — R51.9 HEADACHE: Status: RESOLVED | Noted: 2020-07-22 | Resolved: 2020-07-30

## 2020-07-30 PROBLEM — R82.5 POSITIVE URINE DRUG SCREEN: Status: RESOLVED | Noted: 2020-07-26 | Resolved: 2020-07-30

## 2020-07-30 PROBLEM — I60.9 SAH (SUBARACHNOID HEMORRHAGE) (HCC): Status: RESOLVED | Noted: 2020-07-20 | Resolved: 2020-07-30

## 2020-07-30 PROBLEM — M62.838 MUSCLE SPASM: Status: RESOLVED | Noted: 2020-07-29 | Resolved: 2020-07-30

## 2020-07-30 PROBLEM — I10 HTN (HYPERTENSION): Status: RESOLVED | Noted: 2020-07-20 | Resolved: 2020-07-30

## 2020-07-30 PROBLEM — R79.89 LOW TSH LEVEL: Status: RESOLVED | Noted: 2020-07-26 | Resolved: 2020-07-30

## 2020-07-30 PROBLEM — E87.1 HYPONATREMIA: Status: RESOLVED | Noted: 2020-07-26 | Resolved: 2020-07-30

## 2020-07-30 PROBLEM — Z91.199 NONCOMPLIANCE: Status: RESOLVED | Noted: 2020-07-26 | Resolved: 2020-07-30

## 2020-07-30 LAB
ANION GAP SERPL CALC-SCNC: 13 MMOL/L (ref 7–16)
BUN SERPL-MCNC: 17 MG/DL (ref 8–22)
CALCIUM SERPL-MCNC: 9.7 MG/DL (ref 8.5–10.5)
CHLORIDE SERPL-SCNC: 91 MMOL/L (ref 96–112)
CO2 SERPL-SCNC: 26 MMOL/L (ref 20–33)
CREAT SERPL-MCNC: 1.21 MG/DL (ref 0.5–1.4)
GLUCOSE SERPL-MCNC: 103 MG/DL (ref 65–99)
POTASSIUM SERPL-SCNC: 4.2 MMOL/L (ref 3.6–5.5)
SODIUM SERPL-SCNC: 130 MMOL/L (ref 135–145)

## 2020-07-30 PROCEDURE — A9270 NON-COVERED ITEM OR SERVICE: HCPCS | Performed by: PSYCHIATRY & NEUROLOGY

## 2020-07-30 PROCEDURE — 700102 HCHG RX REV CODE 250 W/ 637 OVERRIDE(OP): Performed by: PSYCHIATRY & NEUROLOGY

## 2020-07-30 PROCEDURE — 99233 SBSQ HOSP IP/OBS HIGH 50: CPT | Performed by: HOSPITALIST

## 2020-07-30 PROCEDURE — 93888 INTRACRANIAL LIMITED STUDY: CPT

## 2020-07-30 PROCEDURE — 80048 BASIC METABOLIC PNL TOTAL CA: CPT

## 2020-07-30 PROCEDURE — 700102 HCHG RX REV CODE 250 W/ 637 OVERRIDE(OP): Performed by: INTERNAL MEDICINE

## 2020-07-30 PROCEDURE — 93888 INTRACRANIAL LIMITED STUDY: CPT | Mod: 26 | Performed by: INTERNAL MEDICINE

## 2020-07-30 PROCEDURE — A9270 NON-COVERED ITEM OR SERVICE: HCPCS | Performed by: INTERNAL MEDICINE

## 2020-07-30 PROCEDURE — 700101 HCHG RX REV CODE 250: Performed by: PSYCHIATRY & NEUROLOGY

## 2020-07-30 PROCEDURE — 770006 HCHG ROOM/CARE - MED/SURG/GYN SEMI*

## 2020-07-30 PROCEDURE — 700111 HCHG RX REV CODE 636 W/ 250 OVERRIDE (IP): Performed by: PSYCHIATRY & NEUROLOGY

## 2020-07-30 PROCEDURE — 700102 HCHG RX REV CODE 250 W/ 637 OVERRIDE(OP): Performed by: HOSPITALIST

## 2020-07-30 PROCEDURE — 700111 HCHG RX REV CODE 636 W/ 250 OVERRIDE (IP): Performed by: INTERNAL MEDICINE

## 2020-07-30 PROCEDURE — 36415 COLL VENOUS BLD VENIPUNCTURE: CPT

## 2020-07-30 PROCEDURE — A9270 NON-COVERED ITEM OR SERVICE: HCPCS | Performed by: HOSPITALIST

## 2020-07-30 RX ORDER — OXYCODONE HYDROCHLORIDE 5 MG/1
5 TABLET ORAL EVERY 4 HOURS PRN
Qty: 30 TAB | Refills: 0 | Status: SHIPPED | OUTPATIENT
Start: 2020-07-30 | End: 2020-08-11

## 2020-07-30 RX ADMIN — LIDOCAINE 1 PATCH: 50 PATCH TOPICAL at 13:58

## 2020-07-30 RX ADMIN — NIMODIPINE 60 MG: 30 CAPSULE ORAL at 13:58

## 2020-07-30 RX ADMIN — CYCLOBENZAPRINE 10 MG: 10 TABLET, FILM COATED ORAL at 09:18

## 2020-07-30 RX ADMIN — Medication 1 G: at 12:20

## 2020-07-30 RX ADMIN — OXYCODONE HYDROCHLORIDE AND ACETAMINOPHEN 1 TABLET: 10; 325 TABLET ORAL at 09:18

## 2020-07-30 RX ADMIN — NIMODIPINE 60 MG: 30 CAPSULE ORAL at 02:05

## 2020-07-30 RX ADMIN — CITALOPRAM HYDROBROMIDE 40 MG: 20 TABLET ORAL at 04:53

## 2020-07-30 RX ADMIN — HYDRALAZINE HYDROCHLORIDE 25 MG: 25 TABLET, FILM COATED ORAL at 23:03

## 2020-07-30 RX ADMIN — OXYCODONE HYDROCHLORIDE AND ACETAMINOPHEN 1 TABLET: 10; 325 TABLET ORAL at 23:03

## 2020-07-30 RX ADMIN — DOCUSATE SODIUM 50 MG AND SENNOSIDES 8.6 MG 2 TABLET: 8.6; 5 TABLET, FILM COATED ORAL at 04:53

## 2020-07-30 RX ADMIN — NIMODIPINE 60 MG: 30 CAPSULE ORAL at 23:02

## 2020-07-30 RX ADMIN — OXYCODONE HYDROCHLORIDE AND ACETAMINOPHEN 1 TABLET: 10; 325 TABLET ORAL at 17:56

## 2020-07-30 RX ADMIN — Medication 1 G: at 08:00

## 2020-07-30 RX ADMIN — HYDROCHLOROTHIAZIDE 25 MG: 25 TABLET ORAL at 04:53

## 2020-07-30 RX ADMIN — Medication 1 G: at 17:56

## 2020-07-30 RX ADMIN — HYDRALAZINE HYDROCHLORIDE 25 MG: 25 TABLET, FILM COATED ORAL at 13:58

## 2020-07-30 RX ADMIN — GABAPENTIN 300 MG: 300 CAPSULE ORAL at 12:20

## 2020-07-30 RX ADMIN — ALPRAZOLAM 0.5 MG: 0.5 TABLET ORAL at 09:18

## 2020-07-30 RX ADMIN — NIMODIPINE 60 MG: 30 CAPSULE ORAL at 09:19

## 2020-07-30 RX ADMIN — METOPROLOL SUCCINATE 25 MG: 25 TABLET, EXTENDED RELEASE ORAL at 04:53

## 2020-07-30 RX ADMIN — GABAPENTIN 300 MG: 300 CAPSULE ORAL at 17:56

## 2020-07-30 RX ADMIN — DOCUSATE SODIUM 50 MG AND SENNOSIDES 8.6 MG 2 TABLET: 8.6; 5 TABLET, FILM COATED ORAL at 17:56

## 2020-07-30 RX ADMIN — OXYCODONE HYDROCHLORIDE AND ACETAMINOPHEN 1 TABLET: 10; 325 TABLET ORAL at 13:58

## 2020-07-30 RX ADMIN — NIMODIPINE 60 MG: 30 CAPSULE ORAL at 04:57

## 2020-07-30 RX ADMIN — AMLODIPINE BESYLATE 10 MG: 10 TABLET ORAL at 04:54

## 2020-07-30 RX ADMIN — NIMODIPINE 60 MG: 30 CAPSULE ORAL at 17:56

## 2020-07-30 RX ADMIN — ANTACID TABLETS 500 MG: 500 TABLET, CHEWABLE ORAL at 04:53

## 2020-07-30 RX ADMIN — CYCLOBENZAPRINE 10 MG: 10 TABLET, FILM COATED ORAL at 00:36

## 2020-07-30 RX ADMIN — GABAPENTIN 300 MG: 300 CAPSULE ORAL at 04:53

## 2020-07-30 RX ADMIN — LIDOCAINE HYDROCHLORIDE 15 ML: 20 SOLUTION OROPHARYNGEAL at 13:54

## 2020-07-30 RX ADMIN — HEPARIN SODIUM 5000 UNITS: 5000 INJECTION, SOLUTION INTRAVENOUS; SUBCUTANEOUS at 04:53

## 2020-07-30 RX ADMIN — HEPARIN SODIUM 5000 UNITS: 5000 INJECTION, SOLUTION INTRAVENOUS; SUBCUTANEOUS at 13:58

## 2020-07-30 RX ADMIN — OXYCODONE HYDROCHLORIDE AND ACETAMINOPHEN 1 TABLET: 10; 325 TABLET ORAL at 04:53

## 2020-07-30 RX ADMIN — HYDRALAZINE HYDROCHLORIDE 25 MG: 25 TABLET, FILM COATED ORAL at 04:53

## 2020-07-30 RX ADMIN — MORPHINE SULFATE 4 MG: 4 INJECTION INTRAVENOUS at 00:07

## 2020-07-30 RX ADMIN — HEPARIN SODIUM 5000 UNITS: 5000 INJECTION, SOLUTION INTRAVENOUS; SUBCUTANEOUS at 23:03

## 2020-07-30 RX ADMIN — ONDANSETRON 4 MG: 2 INJECTION INTRAMUSCULAR; INTRAVENOUS at 05:45

## 2020-07-30 ASSESSMENT — ENCOUNTER SYMPTOMS
PALPITATIONS: 0
SORE THROAT: 0
FOCAL WEAKNESS: 0
HEARTBURN: 0
NERVOUS/ANXIOUS: 1
SPEECH CHANGE: 0
NECK PAIN: 0
DIAPHORESIS: 0
SHORTNESS OF BREATH: 0
MYALGIAS: 0
WEAKNESS: 1
COUGH: 0
FLANK PAIN: 0
SENSORY CHANGE: 0
BACK PAIN: 0
HEADACHES: 1
ABDOMINAL PAIN: 0
NAUSEA: 0
FEVER: 0
CHILLS: 0
DIZZINESS: 0
BLURRED VISION: 0
DIARRHEA: 0
VOMITING: 0
WHEEZING: 0

## 2020-07-30 NOTE — CARE PLAN
Problem: Discharge Barriers/Planning  Goal: Patient's continuum of care needs will be met  Outcome: PROGRESSING AS EXPECTED  Note: Possible discharge to home today. PT/OT has cleared patient for discharge home.  Prescriptions to be sent to patient's pharmacy.     Problem: Respiratory:  Goal: Respiratory status will improve  Outcome: MET  Intervention: Assess and monitor pulmonary status  Note: Patient on room air saturating in the high 90s. Lungs clear.

## 2020-07-30 NOTE — PROGRESS NOTES
Received bedside report from night shift RN. Assumed care of patient. Pt assessed and stable. VSS. Patient reports 0/10 pain at this time.   Discussed plan of care for day with patient and received verbal understanding. Call light within reach, bed in low position.  Educated pt re fall precautions and received verbal understanding.  However, pt continues to refuse bed alarms.  Pt is alert, oriented, steady on feet and calls appropriately.

## 2020-07-30 NOTE — PROGRESS NOTES
Patient reporting left thigh spasms, no PRN PO edications due at this time. As patient wishes to go home today patient agreeable to hold off on PRN morphine until seen by MDs during morning rounds. Hot pack given to place on thigh.

## 2020-07-30 NOTE — THERAPY
Occupational Therapy Contact Note    OT re-evaluation order received. Discussed with nursing, pt has been up SBA to bathroom, no change in functional status compared to initial eval. No re-evaluation warranted at this time.    Karley Barker, OTR/L

## 2020-07-31 ENCOUNTER — APPOINTMENT (OUTPATIENT)
Dept: RADIOLOGY | Facility: MEDICAL CENTER | Age: 40
DRG: 021 | End: 2020-07-31
Attending: PSYCHIATRY & NEUROLOGY

## 2020-07-31 LAB
ANION GAP SERPL CALC-SCNC: 14 MMOL/L (ref 7–16)
BUN SERPL-MCNC: 18 MG/DL (ref 8–22)
CALCIUM SERPL-MCNC: 9.9 MG/DL (ref 8.5–10.5)
CHLORIDE SERPL-SCNC: 89 MMOL/L (ref 96–112)
CO2 SERPL-SCNC: 25 MMOL/L (ref 20–33)
CREAT SERPL-MCNC: 1.2 MG/DL (ref 0.5–1.4)
GLUCOSE SERPL-MCNC: 98 MG/DL (ref 65–99)
POTASSIUM SERPL-SCNC: 4.1 MMOL/L (ref 3.6–5.5)
SODIUM SERPL-SCNC: 128 MMOL/L (ref 135–145)

## 2020-07-31 PROCEDURE — 700101 HCHG RX REV CODE 250: Performed by: PSYCHIATRY & NEUROLOGY

## 2020-07-31 PROCEDURE — 700102 HCHG RX REV CODE 250 W/ 637 OVERRIDE(OP): Performed by: INTERNAL MEDICINE

## 2020-07-31 PROCEDURE — A9270 NON-COVERED ITEM OR SERVICE: HCPCS | Performed by: PSYCHIATRY & NEUROLOGY

## 2020-07-31 PROCEDURE — A9270 NON-COVERED ITEM OR SERVICE: HCPCS | Performed by: INTERNAL MEDICINE

## 2020-07-31 PROCEDURE — 700102 HCHG RX REV CODE 250 W/ 637 OVERRIDE(OP): Performed by: HOSPITALIST

## 2020-07-31 PROCEDURE — 700102 HCHG RX REV CODE 250 W/ 637 OVERRIDE(OP): Performed by: PSYCHIATRY & NEUROLOGY

## 2020-07-31 PROCEDURE — 99233 SBSQ HOSP IP/OBS HIGH 50: CPT | Performed by: HOSPITALIST

## 2020-07-31 PROCEDURE — 700111 HCHG RX REV CODE 636 W/ 250 OVERRIDE (IP): Performed by: PSYCHIATRY & NEUROLOGY

## 2020-07-31 PROCEDURE — 770006 HCHG ROOM/CARE - MED/SURG/GYN SEMI*

## 2020-07-31 PROCEDURE — 93888 INTRACRANIAL LIMITED STUDY: CPT | Mod: 26 | Performed by: INTERNAL MEDICINE

## 2020-07-31 PROCEDURE — 80048 BASIC METABOLIC PNL TOTAL CA: CPT

## 2020-07-31 PROCEDURE — 36415 COLL VENOUS BLD VENIPUNCTURE: CPT

## 2020-07-31 PROCEDURE — 93888 INTRACRANIAL LIMITED STUDY: CPT

## 2020-07-31 PROCEDURE — A9270 NON-COVERED ITEM OR SERVICE: HCPCS | Performed by: HOSPITALIST

## 2020-07-31 RX ORDER — AMLODIPINE BESYLATE 10 MG/1
10 TABLET ORAL DAILY
Qty: 30 TAB | Refills: 0 | Status: SHIPPED | OUTPATIENT
Start: 2020-07-31

## 2020-07-31 RX ORDER — HYDROCHLOROTHIAZIDE 25 MG/1
25 TABLET ORAL DAILY
Qty: 30 TAB | Refills: 0 | Status: SHIPPED | OUTPATIENT
Start: 2020-07-31 | End: 2020-08-30

## 2020-07-31 RX ORDER — OXYCODONE AND ACETAMINOPHEN 10; 325 MG/1; MG/1
1 TABLET ORAL EVERY 4 HOURS PRN
Qty: 24 TAB | Refills: 0 | Status: SHIPPED | OUTPATIENT
Start: 2020-07-31 | End: 2020-08-15

## 2020-07-31 RX ORDER — GABAPENTIN 300 MG/1
300 CAPSULE ORAL 3 TIMES DAILY
Qty: 90 CAP | Refills: 0 | Status: SHIPPED | OUTPATIENT
Start: 2020-07-31

## 2020-07-31 RX ORDER — LISINOPRIL 40 MG/1
40 TABLET ORAL DAILY
Qty: 30 TAB | Refills: 0 | Status: SHIPPED | OUTPATIENT
Start: 2020-07-31

## 2020-07-31 RX ORDER — CITALOPRAM 40 MG/1
40 TABLET ORAL DAILY
Qty: 30 TAB | Refills: 0 | Status: SHIPPED | OUTPATIENT
Start: 2020-07-31

## 2020-07-31 RX ORDER — NIMODIPINE 30 MG/1
60 CAPSULE, LIQUID FILLED ORAL EVERY 4 HOURS
Qty: 120 CAP | Refills: 0 | Status: SHIPPED | OUTPATIENT
Start: 2020-07-31

## 2020-07-31 RX ORDER — ALPRAZOLAM 0.5 MG/1
0.5 TABLET ORAL 3 TIMES DAILY PRN
Qty: 30 TAB | Refills: 0 | Status: SHIPPED | OUTPATIENT
Start: 2020-07-31 | End: 2020-08-15

## 2020-07-31 RX ORDER — CYCLOBENZAPRINE HCL 10 MG
10 TABLET ORAL 3 TIMES DAILY PRN
Qty: 30 TAB | Refills: 0 | Status: SHIPPED | OUTPATIENT
Start: 2020-07-31

## 2020-07-31 RX ORDER — HYDRALAZINE HYDROCHLORIDE 25 MG/1
25 TABLET, FILM COATED ORAL EVERY 8 HOURS
Qty: 90 TAB | Refills: 0 | Status: SHIPPED | OUTPATIENT
Start: 2020-07-31

## 2020-07-31 RX ORDER — METOPROLOL SUCCINATE 25 MG/1
25 TABLET, EXTENDED RELEASE ORAL DAILY
Qty: 30 TAB | Refills: 0 | Status: SHIPPED | OUTPATIENT
Start: 2020-07-31

## 2020-07-31 RX ORDER — SODIUM CHLORIDE 1 G/1
1 TABLET ORAL
Qty: 90 TAB | Refills: 3 | Status: SHIPPED | OUTPATIENT
Start: 2020-07-31

## 2020-07-31 RX ADMIN — NIMODIPINE 60 MG: 30 CAPSULE ORAL at 18:38

## 2020-07-31 RX ADMIN — OXYCODONE HYDROCHLORIDE AND ACETAMINOPHEN 1 TABLET: 10; 325 TABLET ORAL at 13:52

## 2020-07-31 RX ADMIN — LIDOCAINE 1 PATCH: 50 PATCH TOPICAL at 13:52

## 2020-07-31 RX ADMIN — GABAPENTIN 300 MG: 300 CAPSULE ORAL at 06:56

## 2020-07-31 RX ADMIN — NIMODIPINE 60 MG: 30 CAPSULE ORAL at 03:07

## 2020-07-31 RX ADMIN — DOCUSATE SODIUM 50 MG AND SENNOSIDES 8.6 MG 2 TABLET: 8.6; 5 TABLET, FILM COATED ORAL at 06:56

## 2020-07-31 RX ADMIN — HEPARIN SODIUM 5000 UNITS: 5000 INJECTION, SOLUTION INTRAVENOUS; SUBCUTANEOUS at 20:30

## 2020-07-31 RX ADMIN — HEPARIN SODIUM 5000 UNITS: 5000 INJECTION, SOLUTION INTRAVENOUS; SUBCUTANEOUS at 06:57

## 2020-07-31 RX ADMIN — CITALOPRAM HYDROBROMIDE 40 MG: 20 TABLET ORAL at 06:55

## 2020-07-31 RX ADMIN — Medication 1 G: at 08:21

## 2020-07-31 RX ADMIN — NIMODIPINE 60 MG: 30 CAPSULE ORAL at 21:56

## 2020-07-31 RX ADMIN — OXYCODONE HYDROCHLORIDE AND ACETAMINOPHEN 1 TABLET: 10; 325 TABLET ORAL at 05:00

## 2020-07-31 RX ADMIN — AMLODIPINE BESYLATE 10 MG: 10 TABLET ORAL at 06:56

## 2020-07-31 RX ADMIN — HYDRALAZINE HYDROCHLORIDE 25 MG: 25 TABLET, FILM COATED ORAL at 06:56

## 2020-07-31 RX ADMIN — GABAPENTIN 300 MG: 300 CAPSULE ORAL at 18:39

## 2020-07-31 RX ADMIN — NIMODIPINE 60 MG: 30 CAPSULE ORAL at 16:02

## 2020-07-31 RX ADMIN — OXYCODONE HYDROCHLORIDE AND ACETAMINOPHEN 1 TABLET: 10; 325 TABLET ORAL at 20:30

## 2020-07-31 RX ADMIN — HEPARIN SODIUM 5000 UNITS: 5000 INJECTION, SOLUTION INTRAVENOUS; SUBCUTANEOUS at 13:52

## 2020-07-31 RX ADMIN — METOPROLOL SUCCINATE 25 MG: 25 TABLET, EXTENDED RELEASE ORAL at 06:56

## 2020-07-31 RX ADMIN — HYDROCHLOROTHIAZIDE 25 MG: 25 TABLET ORAL at 06:55

## 2020-07-31 RX ADMIN — NIMODIPINE 60 MG: 30 CAPSULE ORAL at 11:35

## 2020-07-31 RX ADMIN — CYCLOBENZAPRINE 10 MG: 10 TABLET, FILM COATED ORAL at 03:07

## 2020-07-31 RX ADMIN — Medication 1 G: at 18:38

## 2020-07-31 RX ADMIN — HYDRALAZINE HYDROCHLORIDE 25 MG: 25 TABLET, FILM COATED ORAL at 20:30

## 2020-07-31 RX ADMIN — DOCUSATE SODIUM 50 MG AND SENNOSIDES 8.6 MG 2 TABLET: 8.6; 5 TABLET, FILM COATED ORAL at 18:38

## 2020-07-31 RX ADMIN — HYDRALAZINE HYDROCHLORIDE 25 MG: 25 TABLET, FILM COATED ORAL at 13:52

## 2020-07-31 RX ADMIN — NIMODIPINE 60 MG: 30 CAPSULE ORAL at 06:56

## 2020-07-31 RX ADMIN — Medication 1 G: at 11:35

## 2020-07-31 RX ADMIN — CYCLOBENZAPRINE 10 MG: 10 TABLET, FILM COATED ORAL at 13:52

## 2020-07-31 RX ADMIN — GABAPENTIN 300 MG: 300 CAPSULE ORAL at 11:35

## 2020-07-31 ASSESSMENT — ENCOUNTER SYMPTOMS
DIZZINESS: 0
WEAKNESS: 1
NERVOUS/ANXIOUS: 1
FEVER: 0
NECK PAIN: 0
BACK PAIN: 0
SENSORY CHANGE: 0
HEADACHES: 1
FLANK PAIN: 0
DIAPHORESIS: 0
VOMITING: 0
MYALGIAS: 0
DIARRHEA: 0
SORE THROAT: 0
ABDOMINAL PAIN: 0
BLURRED VISION: 0
FOCAL WEAKNESS: 0
CHILLS: 0
SHORTNESS OF BREATH: 0
NAUSEA: 0
WHEEZING: 0
HEARTBURN: 0
COUGH: 0
PALPITATIONS: 0
SPEECH CHANGE: 0

## 2020-07-31 NOTE — CARE PLAN
Problem: Pain Management  Goal: Pain level will decrease to patient's comfort goal  Outcome: PROGRESSING SLOWER THAN EXPECTED  Flowsheets  Taken 7/31/2020 0826  Comfort Goal:   Comfort with Movement   Sleep Comfortably  Taken 7/31/2020 1025  Pain Rating Scale (NPRS): 9  Note: Pt in 10/10 pain after ambulating to bathroom, reviewed pain medication regimen and due times, pt verbalized acknowledgement. Pt asleep upon nursing rounds. No signs of pain at this time. Will continue to monitor.     Problem: Infection  Goal: Will remain free from infection  Outcome: PROGRESSING AS EXPECTED

## 2020-07-31 NOTE — PROGRESS NOTES
Rounding for pain reassessment done, pt is asleep. No signs of pain. Attempted to wake patient up, pt still asleep. Observed rise and fall of chest. Hourly rounding in progress.

## 2020-07-31 NOTE — PROGRESS NOTES
Hospital Medicine Daily Progress Note    Date of Service  7/30/2020    Chief Complaint  40 y.o. female admitted 7/20/2020 with SAH.    Hospital Course  40 y.o. 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  Interventional radiology was called on the case as well as neurosurgery, patient underwent endovascular repair of the PCOM aneurysm.  Repeat CT scan shows no increased hemorrhage.  TCD's have been negative so far.  Patient with known history of hypertension, admits to noncompliance with taking her medications.  Hypertension currently well controlled.  Care and neurosurgery have cleared her for transfer from the ICU.    Interval Problem Update  CC today of dyspepsia.      Consultants/Specialty  Critical care  Neurosurgery    Code Status  Full    Disposition  Likely stable for home with family following daily transcranial Dopplers until 7/31/2020.    Review of Systems  Review of Systems   Constitutional: Negative for chills, diaphoresis, fever and malaise/fatigue.   HENT: Negative for congestion, hearing loss and sore throat.    Eyes: Negative for blurred vision.   Respiratory: Negative for cough, shortness of breath and wheezing.    Cardiovascular: Negative for chest pain, palpitations and leg swelling.   Gastrointestinal: Negative for abdominal pain, diarrhea, heartburn, nausea and vomiting.   Genitourinary: Negative for dysuria, flank pain and hematuria.   Musculoskeletal: Negative for back pain, joint pain, myalgias and neck pain.   Skin: Negative for rash.   Neurological: Positive for weakness and headaches. Negative for dizziness, sensory change, speech change and focal weakness.   Psychiatric/Behavioral: The patient is nervous/anxious.         Physical Exam  Temp:  [36.4 °C (97.5 °F)-36.7 °C (98 °F)] 36.5 °C (97.7 °F)  Pulse:  [65-89] 65  Resp:  [16-18] 16  BP: (104-134)/(54-76) 104/54  SpO2:  [93 %-99 %] 99 %    Physical Exam  Vitals signs and nursing note  reviewed.   HENT:      Head: Normocephalic and atraumatic.      Nose: No congestion.      Mouth/Throat:      Mouth: Mucous membranes are moist.   Eyes:      Extraocular Movements: Extraocular movements intact.      Conjunctiva/sclera: Conjunctivae normal.      Pupils: Pupils are equal, round, and reactive to light.   Neck:      Musculoskeletal: No muscular tenderness.   Cardiovascular:      Rate and Rhythm: Normal rate and regular rhythm.   Pulmonary:      Effort: Pulmonary effort is normal.      Breath sounds: Normal breath sounds.   Abdominal:      General: Bowel sounds are normal. There is no distension.      Palpations: Abdomen is soft.      Tenderness: There is no abdominal tenderness. There is no guarding or rebound.   Musculoskeletal:      Right lower leg: No edema.      Left lower leg: No edema.   Lymphadenopathy:      Cervical: No cervical adenopathy.   Skin:     General: Skin is warm and dry.   Neurological:      General: No focal deficit present.      Mental Status: She is alert and oriented to person, place, and time.      Cranial Nerves: No cranial nerve deficit.         Current Facility-Administered Medications:   •  oxyCODONE-acetaminophen (PERCOCET-10)  MG per tablet 1 Tab, 1 Tab, Oral, Q4HRS PRN, Pepito Lewis M.D., 1 Tab at 07/30/20 1358  •  citalopram (CELEXA) tablet 40 mg, 40 mg, Oral, DAILY, Pepito Lewis M.D., 40 mg at 07/30/20 0453  •  hydroCHLOROthiazide (HYDRODIURIL) tablet 25 mg, 25 mg, Oral, DAILY, Pepito Lewis M.D., 25 mg at 07/30/20 0453  •  metoprolol SR (TOPROL XL) tablet 25 mg, 25 mg, Oral, DAILY, Pepito Lewis M.D., 25 mg at 07/30/20 0453  •  cyclobenzaprine (FLEXERIL) tablet 10 mg, 10 mg, Oral, TID PRN, Pepito Lewis M.D., 10 mg at 07/30/20 0918  •  ALPRAZolam (XANAX) tablet 0.5 mg, 0.5 mg, Oral, TID PRN, Pepito Lewis M.D., 0.5 mg at 07/30/20 0918  •  morphine (pf) 4 MG/ML injection 4 mg, 4 mg, Intravenous, Q4HRS PRN, Mingo Velasquez M.D., 4 mg at 07/30/20 0007  •   hydrALAZINE (APRESOLINE) tablet 25 mg, 25 mg, Oral, Q8HRS, Mingo Velasquez M.D., 25 mg at 07/30/20 1358  •  sodium chloride (SALT) tablet 1 g, 1 g, Oral, TID WITH MEALS, Mingo Velasquez M.D., 1 g at 07/30/20 1220  •  calcium carbonate (TUMS) chewable tab 500 mg, 500 mg, Oral, DAILY, Norm Marie M.D., 500 mg at 07/30/20 0453  •  heparin injection 5,000 Units, 5,000 Units, Subcutaneous, Q8HRS, Cristiano Carlisle M.D., 5,000 Units at 07/30/20 1358  •  albuterol inhaler 2 Puff, 2 Puff, Inhalation, Q4H PRN (RT), Norm Marie M.D.  •  amLODIPine (NORVASC) tablet 10 mg, 10 mg, Oral, DAILY, Cristiano Carlisle M.D., 10 mg at 07/30/20 0454  •  lidocaine (LIDODERM) 5 % 1 Patch, 1 Patch, Transdermal, Q24HR, Cristiano Carlisle M.D., 1 Patch at 07/30/20 1358  •  gabapentin (NEURONTIN) capsule 300 mg, 300 mg, Oral, TID, Cristiano Carlisle M.D., 300 mg at 07/30/20 1220  •  senna-docusate (PERICOLACE or SENOKOT S) 8.6-50 MG per tablet 2 Tab, 2 Tab, Oral, BID, 2 Tab at 07/30/20 0453 **AND** polyethylene glycol/lytes (MIRALAX) PACKET 1 Packet, 1 Packet, Oral, QDAY PRN, 1 Packet at 07/27/20 2109 **AND** magnesium hydroxide (MILK OF MAGNESIA) suspension 30 mL, 30 mL, Oral, QDAY PRN **AND** bisacodyl (DULCOLAX) suppository 10 mg, 10 mg, Rectal, QDAY PRN, Norm Marie M.D.  •  Respiratory Therapy Consult, , Nebulization, Continuous RT, Norm Marie M.D.  •  acetaminophen (TYLENOL) tablet 650 mg, 650 mg, Oral, Q6HRS PRN, Norm Marie M.D., 650 mg at 07/29/20 0543  •  labetalol (NORMODYNE/TRANDATE) injection 10 mg, 10 mg, Intravenous, Q4HRS PRN, Norm Marie M.D., 10 mg at 07/21/20 0204  •  ondansetron (ZOFRAN) syringe/vial injection 4 mg, 4 mg, Intravenous, Q4HRS PRN, Norm Marie M.D., 4 mg at 07/30/20 0545  •  ondansetron (ZOFRAN ODT) dispertab 4 mg, 4 mg, Oral, Q4HRS PRN, Norm Marie M.D.  •  promethazine (PHENERGAN) tablet 12.5-25 mg, 12.5-25 mg, Oral, Q4HRS PRN, Norm Marie M.D.  •  promethazine (PHENERGAN)  suppository 12.5-25 mg, 12.5-25 mg, Rectal, Q4HRS PRN, Norm Marie M.D.  •  prochlorperazine (COMPAZINE) injection 5-10 mg, 5-10 mg, Intravenous, Q4HRS PRN, Norm Marie M.D.  •  niMODipine (NIMOTOP) capsule 60 mg, 60 mg, Oral, Q4HRS, Cristiano Carlisle M.D., 60 mg at 07/30/20 1358      Fluids    Intake/Output Summary (Last 24 hours) at 7/30/2020 1744  Last data filed at 7/29/2020 2007  Gross per 24 hour   Intake 800 ml   Output --   Net 800 ml       Laboratory  Recent Labs     07/28/20  0702   WBC 8.9   RBC 4.88   HEMOGLOBIN 13.5   HEMATOCRIT 42.3   MCV 86.7   MCH 27.7   MCHC 31.9*   RDW 48.3   PLATELETCT 224   MPV 11.6     Recent Labs     07/28/20  0702 07/29/20  0405 07/30/20  0504   SODIUM 131* 133* 130*   POTASSIUM 4.4 4.6 4.2   CHLORIDE 93* 96 91*   CO2 25 23 26   GLUCOSE 112* 96 103*   BUN 14 12 17   CREATININE 1.02 1.17 1.21   CALCIUM 9.5 9.3 9.7                   Imaging  US-INTRACRANIAL ARTERY LIMITED   Final Result      US-INTRACRANIAL ARTERY LIMITED   Final Result      US-INTRACRANIAL ARTERY LIMITED   Final Result      US-INTRACRANIAL ARTERY LIMITED   Final Result      US-INTRACRANIAL ARTERY LIMITED   Final Result      US-INTRACRANIAL ARTERY LIMITED   Final Result      US-INTRACRANIAL ARTERY LIMITED   Final Result      US-INTRACRANIAL ARTERY LIMITED   Final Result      US-INTRACRANIAL ARTERY LIMITED   Final Result      CT-HEAD W/O   Final Result      1.  No CT evidence of acute infarct, hemorrhage or mass.   2.  Right suprasellar aneurysm coil.      EC-ECHOCARDIOGRAM COMPLETE W/O CONT   Final Result      US-INTRACRANIAL ARTERY COMP   Final Result      IR-EMBOLIZATION   Final Result   Addendum 1 of 1   Addendum:      Used coils:   First coil:[employer] [place] Soft 10 Endovascular Embolization Coil 3D    2.1eic6ta placed in the Right PCOM. Ref [id number]. Lot [id number]   ?   Second coil:   [employer] Hydro Soft 10 Endovascular Embolization Coil 3D 1.1uzk0zd    placed in the Right PCOM. Ref  [phone]. Lot [id number]      coil which is not used:      Hydrocoil 10 [id number] LOT no:[id number]      Final      CT-CTA HEAD WITH & W/O-POST PROCESS   Final Result         1.  3.1 mm right aneurysm, likely from the supraclinoid segment.   2.  Right sylvian fissure, frontal, and bilateral basal cistern, appearance compatible subarachnoid hemorrhage.      These findings were discussed with the patient's clinician, Daniel Leiva, on 7/20/2020 9:51 PM.      CT-CTA NECK WITH & W/O-POST PROCESSING   Final Result         1.  CT angiogram of the neck within normal limits.              Assessment/Plan  Pain, chronic- (present on admission)  Assessment & Plan  Pain control       VTE prophylaxis: Heparin

## 2020-07-31 NOTE — CARE PLAN
Problem: Psychosocial Needs:  Goal: Level of anxiety will decrease  Outcome: PROGRESSING AS EXPECTED  Note: No c/o anxiety or need for Xanax during shift. Pt communicating needs and using call light appropriately. Pain continues to be the primary complaint, Percocet and Flexeril given overnight. Hourly rounding in place.      Problem: Safety  Goal: Will remain free from falls  Outcome: PROGRESSING AS EXPECTED  Note: No falls during shift. Pt is stand by assist only, steady gait, 5/5 strength in upper and lower extremities. Universal precautions in place and fall/safety education reinforced.

## 2020-08-01 ENCOUNTER — APPOINTMENT (OUTPATIENT)
Dept: RADIOLOGY | Facility: MEDICAL CENTER | Age: 40
DRG: 021 | End: 2020-08-01
Attending: HOSPITALIST

## 2020-08-01 PROBLEM — I72.9 ANEURYSM (HCC): Status: ACTIVE | Noted: 2020-08-01

## 2020-08-01 LAB
ANION GAP SERPL CALC-SCNC: 14 MMOL/L (ref 7–16)
BUN SERPL-MCNC: 19 MG/DL (ref 8–22)
CALCIUM SERPL-MCNC: 9.5 MG/DL (ref 8.5–10.5)
CHLORIDE SERPL-SCNC: 90 MMOL/L (ref 96–112)
CO2 SERPL-SCNC: 27 MMOL/L (ref 20–33)
CREAT SERPL-MCNC: 1.19 MG/DL (ref 0.5–1.4)
GLUCOSE SERPL-MCNC: 101 MG/DL (ref 65–99)
POTASSIUM SERPL-SCNC: 3.6 MMOL/L (ref 3.6–5.5)
SODIUM SERPL-SCNC: 131 MMOL/L (ref 135–145)

## 2020-08-01 PROCEDURE — 36415 COLL VENOUS BLD VENIPUNCTURE: CPT

## 2020-08-01 PROCEDURE — 700102 HCHG RX REV CODE 250 W/ 637 OVERRIDE(OP): Performed by: INTERNAL MEDICINE

## 2020-08-01 PROCEDURE — 700101 HCHG RX REV CODE 250: Performed by: PSYCHIATRY & NEUROLOGY

## 2020-08-01 PROCEDURE — A9270 NON-COVERED ITEM OR SERVICE: HCPCS | Performed by: HOSPITALIST

## 2020-08-01 PROCEDURE — 770006 HCHG ROOM/CARE - MED/SURG/GYN SEMI*

## 2020-08-01 PROCEDURE — 700111 HCHG RX REV CODE 636 W/ 250 OVERRIDE (IP): Performed by: INTERNAL MEDICINE

## 2020-08-01 PROCEDURE — 700102 HCHG RX REV CODE 250 W/ 637 OVERRIDE(OP): Performed by: PSYCHIATRY & NEUROLOGY

## 2020-08-01 PROCEDURE — A9270 NON-COVERED ITEM OR SERVICE: HCPCS | Performed by: PSYCHIATRY & NEUROLOGY

## 2020-08-01 PROCEDURE — 70450 CT HEAD/BRAIN W/O DYE: CPT

## 2020-08-01 PROCEDURE — A9270 NON-COVERED ITEM OR SERVICE: HCPCS | Performed by: INTERNAL MEDICINE

## 2020-08-01 PROCEDURE — 80048 BASIC METABOLIC PNL TOTAL CA: CPT

## 2020-08-01 PROCEDURE — 700102 HCHG RX REV CODE 250 W/ 637 OVERRIDE(OP): Performed by: HOSPITALIST

## 2020-08-01 PROCEDURE — 700111 HCHG RX REV CODE 636 W/ 250 OVERRIDE (IP): Performed by: PSYCHIATRY & NEUROLOGY

## 2020-08-01 PROCEDURE — 99232 SBSQ HOSP IP/OBS MODERATE 35: CPT | Performed by: HOSPITALIST

## 2020-08-01 RX ADMIN — HYDRALAZINE HYDROCHLORIDE 25 MG: 25 TABLET, FILM COATED ORAL at 13:27

## 2020-08-01 RX ADMIN — Medication 1 G: at 18:21

## 2020-08-01 RX ADMIN — OXYCODONE HYDROCHLORIDE AND ACETAMINOPHEN 1 TABLET: 10; 325 TABLET ORAL at 13:29

## 2020-08-01 RX ADMIN — Medication 1 G: at 11:58

## 2020-08-01 RX ADMIN — ANTACID TABLETS 500 MG: 500 TABLET, CHEWABLE ORAL at 04:51

## 2020-08-01 RX ADMIN — NIMODIPINE 60 MG: 30 CAPSULE ORAL at 10:35

## 2020-08-01 RX ADMIN — HYDRALAZINE HYDROCHLORIDE 25 MG: 25 TABLET, FILM COATED ORAL at 04:51

## 2020-08-01 RX ADMIN — NIMODIPINE 60 MG: 30 CAPSULE ORAL at 04:50

## 2020-08-01 RX ADMIN — GABAPENTIN 300 MG: 300 CAPSULE ORAL at 18:21

## 2020-08-01 RX ADMIN — CITALOPRAM HYDROBROMIDE 40 MG: 20 TABLET ORAL at 04:50

## 2020-08-01 RX ADMIN — DOCUSATE SODIUM 50 MG AND SENNOSIDES 8.6 MG 2 TABLET: 8.6; 5 TABLET, FILM COATED ORAL at 04:51

## 2020-08-01 RX ADMIN — LIDOCAINE 1 PATCH: 50 PATCH TOPICAL at 13:27

## 2020-08-01 RX ADMIN — GABAPENTIN 300 MG: 300 CAPSULE ORAL at 04:51

## 2020-08-01 RX ADMIN — HEPARIN SODIUM 5000 UNITS: 5000 INJECTION, SOLUTION INTRAVENOUS; SUBCUTANEOUS at 04:51

## 2020-08-01 RX ADMIN — MORPHINE SULFATE 4 MG: 4 INJECTION INTRAVENOUS at 21:27

## 2020-08-01 RX ADMIN — CYCLOBENZAPRINE 10 MG: 10 TABLET, FILM COATED ORAL at 20:03

## 2020-08-01 RX ADMIN — GABAPENTIN 300 MG: 300 CAPSULE ORAL at 11:58

## 2020-08-01 RX ADMIN — OXYCODONE HYDROCHLORIDE AND ACETAMINOPHEN 1 TABLET: 10; 325 TABLET ORAL at 02:04

## 2020-08-01 RX ADMIN — Medication 1 G: at 08:08

## 2020-08-01 RX ADMIN — HEPARIN SODIUM 5000 UNITS: 5000 INJECTION, SOLUTION INTRAVENOUS; SUBCUTANEOUS at 13:28

## 2020-08-01 RX ADMIN — NIMODIPINE 60 MG: 30 CAPSULE ORAL at 21:27

## 2020-08-01 RX ADMIN — NIMODIPINE 60 MG: 30 CAPSULE ORAL at 13:28

## 2020-08-01 RX ADMIN — OXYCODONE HYDROCHLORIDE AND ACETAMINOPHEN 1 TABLET: 10; 325 TABLET ORAL at 19:53

## 2020-08-01 RX ADMIN — HEPARIN SODIUM 5000 UNITS: 5000 INJECTION, SOLUTION INTRAVENOUS; SUBCUTANEOUS at 19:54

## 2020-08-01 RX ADMIN — NIMODIPINE 60 MG: 30 CAPSULE ORAL at 01:57

## 2020-08-01 RX ADMIN — HYDRALAZINE HYDROCHLORIDE 25 MG: 25 TABLET, FILM COATED ORAL at 19:53

## 2020-08-01 RX ADMIN — NIMODIPINE 60 MG: 30 CAPSULE ORAL at 18:21

## 2020-08-01 ASSESSMENT — ENCOUNTER SYMPTOMS
CHILLS: 0
WHEEZING: 0
BLURRED VISION: 0
NERVOUS/ANXIOUS: 1
NECK PAIN: 0
ABDOMINAL PAIN: 0
DIARRHEA: 0
BACK PAIN: 0
FOCAL WEAKNESS: 0
SHORTNESS OF BREATH: 0
FEVER: 0
COUGH: 0
SPEECH CHANGE: 0
DIAPHORESIS: 0
HEADACHES: 1
MYALGIAS: 0
SORE THROAT: 0
PALPITATIONS: 0
WEAKNESS: 1
DIZZINESS: 0
NAUSEA: 0
SENSORY CHANGE: 0
FLANK PAIN: 0
VOMITING: 0
HEARTBURN: 0

## 2020-08-01 NOTE — CARE PLAN
Problem: Pain Management  Goal: Pain level will decrease to patient's comfort goal  Outcome: PROGRESSING AS EXPECTED     Problem: Fluid Volume:  Goal: Will maintain balanced intake and output  Outcome: PROGRESSING AS EXPECTED     Problem: Psychosocial Needs:  Goal: Level of anxiety will decrease  Outcome: PROGRESSING AS EXPECTED     Problem: Communication  Goal: The ability to communicate needs accurately and effectively will improve  Outcome: PROGRESSING AS EXPECTED     Problem: Safety  Goal: Will remain free from injury  Outcome: PROGRESSING AS EXPECTED     Problem: Infection  Goal: Will remain free from infection  Outcome: PROGRESSING AS EXPECTED     Problem: Venous Thromboembolism (VTW)/Deep Vein Thrombosis (DVT) Prevention:  Goal: Patient will participate in Venous Thrombosis (VTE)/Deep Vein Thrombosis (DVT)Prevention Measures  Outcome: PROGRESSING AS EXPECTED     Problem: Bowel/Gastric:  Goal: Normal bowel function is maintained or improved  Outcome: PROGRESSING AS EXPECTED  Goal: Will not experience complications related to bowel motility  Outcome: PROGRESSING AS EXPECTED     Problem: Knowledge Deficit  Goal: Knowledge of disease process/condition, treatment plan, diagnostic tests, and medications will improve  Outcome: PROGRESSING AS EXPECTED     Problem: Skin Integrity  Goal: Risk for impaired skin integrity will decrease  Outcome: PROGRESSING AS EXPECTED

## 2020-08-01 NOTE — PROGRESS NOTES
Hospital Medicine Daily Progress Note    Date of Service  7/31/2020    Chief Complaint  40 y.o. female admitted 7/20/2020 with SAH.    Hospital Course  40 y.o. 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  Interventional radiology was called on the case as well as neurosurgery, patient underwent endovascular repair of the PCOM aneurysm.  Repeat CT scan shows no increased hemorrhage.  TCD's have been negative so far.  Patient with known history of hypertension, admits to noncompliance with taking her medications.  Hypertension currently well controlled.  Care and neurosurgery have cleared her for transfer from the ICU.    Interval Problem Update  Over the past 5 days this has multiple systemic complaints of anxiety, headache, muscle spasm, dyspepsia, depression.  Unclear if these may be related to her head trauma.  Today she is completed her recommended course of daily monitoring, and is stable for discharge, is fearful of leaving.    Consultants/Specialty  Critical care  Neurosurgery    Code Status  Full    Disposition  Pending, consider neurology versus neurosurgery consultation to ensure that there are no occult signs of worsening subdural or subarachnoid bleed that needs to be addressed prior to discharge.  Can consider head CBCT.    Review of Systems  Review of Systems   Constitutional: Negative for chills, diaphoresis, fever and malaise/fatigue.   HENT: Negative for congestion, hearing loss and sore throat.    Eyes: Negative for blurred vision.   Respiratory: Negative for cough, shortness of breath and wheezing.    Cardiovascular: Negative for chest pain, palpitations and leg swelling.   Gastrointestinal: Negative for abdominal pain, diarrhea, heartburn, nausea and vomiting.   Genitourinary: Negative for dysuria, flank pain and hematuria.   Musculoskeletal: Negative for back pain, joint pain, myalgias and neck pain.   Skin: Negative for rash.   Neurological:  Positive for weakness and headaches. Negative for dizziness, sensory change, speech change and focal weakness.   Psychiatric/Behavioral: The patient is nervous/anxious.         Physical Exam  Temp:  [36.5 °C (97.7 °F)-36.9 °C (98.5 °F)] 36.5 °C (97.7 °F)  Pulse:  [86-95] 86  Resp:  [16-18] 16  BP: (102-116)/(63-83) 107/63  SpO2:  [92 %-100 %] 92 %    Physical Exam  Vitals signs and nursing note reviewed.   HENT:      Head: Normocephalic and atraumatic.      Nose: No congestion.      Mouth/Throat:      Mouth: Mucous membranes are moist.   Eyes:      Extraocular Movements: Extraocular movements intact.      Conjunctiva/sclera: Conjunctivae normal.      Pupils: Pupils are equal, round, and reactive to light.   Neck:      Musculoskeletal: No muscular tenderness.   Cardiovascular:      Rate and Rhythm: Normal rate and regular rhythm.   Pulmonary:      Effort: Pulmonary effort is normal.      Breath sounds: Normal breath sounds.   Abdominal:      General: Bowel sounds are normal. There is no distension.      Palpations: Abdomen is soft.      Tenderness: There is no abdominal tenderness. There is no guarding or rebound.   Musculoskeletal:      Right lower leg: No edema.      Left lower leg: No edema.   Lymphadenopathy:      Cervical: No cervical adenopathy.   Skin:     General: Skin is warm and dry.   Neurological:      General: No focal deficit present.      Mental Status: She is alert and oriented to person, place, and time.      Cranial Nerves: No cranial nerve deficit.         Current Facility-Administered Medications:   •  oxyCODONE-acetaminophen (PERCOCET-10)  MG per tablet 1 Tab, 1 Tab, Oral, Q4HRS PRN, Pepito Lewis M.D., 1 Tab at 07/31/20 1352  •  citalopram (CELEXA) tablet 40 mg, 40 mg, Oral, DAILY, Pepito Lewis M.D., 40 mg at 07/31/20 0655  •  hydroCHLOROthiazide (HYDRODIURIL) tablet 25 mg, 25 mg, Oral, DAILY, Pepito Lewis M.D., 25 mg at 07/31/20 0655  •  metoprolol SR (TOPROL XL) tablet 25 mg, 25  mg, Oral, DAILY, Pepito Lewis M.D., 25 mg at 07/31/20 0656  •  cyclobenzaprine (FLEXERIL) tablet 10 mg, 10 mg, Oral, TID PRN, Pepito Lewis M.D., 10 mg at 07/31/20 1352  •  ALPRAZolam (XANAX) tablet 0.5 mg, 0.5 mg, Oral, TID PRN, Pepito Lewis M.D., 0.5 mg at 07/30/20 0918  •  morphine (pf) 4 MG/ML injection 4 mg, 4 mg, Intravenous, Q4HRS PRN, Mingo Velasquez M.D., 4 mg at 07/30/20 0007  •  hydrALAZINE (APRESOLINE) tablet 25 mg, 25 mg, Oral, Q8HRS, Mingo Velasquez M.D., 25 mg at 07/31/20 1352  •  sodium chloride (SALT) tablet 1 g, 1 g, Oral, TID WITH MEALS, Mingo Velasquez M.D., 1 g at 07/31/20 1135  •  calcium carbonate (TUMS) chewable tab 500 mg, 500 mg, Oral, DAILY, Norm Marie M.D., 500 mg at 07/30/20 0453  •  heparin injection 5,000 Units, 5,000 Units, Subcutaneous, Q8HRS, Cristiano Carlisle M.D., 5,000 Units at 07/31/20 1352  •  albuterol inhaler 2 Puff, 2 Puff, Inhalation, Q4H PRN (RT), Norm Marie M.D.  •  amLODIPine (NORVASC) tablet 10 mg, 10 mg, Oral, DAILY, Cristiano Carlisle M.D., 10 mg at 07/31/20 0656  •  lidocaine (LIDODERM) 5 % 1 Patch, 1 Patch, Transdermal, Q24HR, Cristiano Carlisle M.D., 1 Patch at 07/31/20 1352  •  gabapentin (NEURONTIN) capsule 300 mg, 300 mg, Oral, TID, Cristiano Carlisle M.D., 300 mg at 07/31/20 1135  •  senna-docusate (PERICOLACE or SENOKOT S) 8.6-50 MG per tablet 2 Tab, 2 Tab, Oral, BID, 2 Tab at 07/31/20 0656 **AND** polyethylene glycol/lytes (MIRALAX) PACKET 1 Packet, 1 Packet, Oral, QDAY PRN, 1 Packet at 07/27/20 2109 **AND** magnesium hydroxide (MILK OF MAGNESIA) suspension 30 mL, 30 mL, Oral, QDAY PRN **AND** bisacodyl (DULCOLAX) suppository 10 mg, 10 mg, Rectal, QDAY PRN, Norm Marie M.D.  •  Respiratory Therapy Consult, , Nebulization, Continuous RT, Norm Marie M.D.  •  acetaminophen (TYLENOL) tablet 650 mg, 650 mg, Oral, Q6HRS PRN, Norm Marie M.D., 650 mg at 07/29/20 0543  •  labetalol (NORMODYNE/TRANDATE) injection 10 mg, 10 mg, Intravenous, Q4HRS  PRN, Norm Marie M.D., 10 mg at 07/21/20 0204  •  ondansetron (ZOFRAN) syringe/vial injection 4 mg, 4 mg, Intravenous, Q4HRS PRN, Norm Marie M.D., 4 mg at 07/30/20 0545  •  ondansetron (ZOFRAN ODT) dispertab 4 mg, 4 mg, Oral, Q4HRS PRN, Norm Marie M.D.  •  promethazine (PHENERGAN) tablet 12.5-25 mg, 12.5-25 mg, Oral, Q4HRS PRN, Norm Marie M.D.  •  promethazine (PHENERGAN) suppository 12.5-25 mg, 12.5-25 mg, Rectal, Q4HRS PRN, Norm Marie M.D.  •  prochlorperazine (COMPAZINE) injection 5-10 mg, 5-10 mg, Intravenous, Q4HRS PRN, Norm Marie M.D.  •  niMODipine (NIMOTOP) capsule 60 mg, 60 mg, Oral, Q4HRS, Cristiano Carlisle M.D., 60 mg at 07/31/20 1602      Fluids    Intake/Output Summary (Last 24 hours) at 7/31/2020 1733  Last data filed at 7/31/2020 1100  Gross per 24 hour   Intake 120 ml   Output --   Net 120 ml       Laboratory      Recent Labs     07/29/20  0405 07/30/20  0504 07/31/20  0352   SODIUM 133* 130* 128*   POTASSIUM 4.6 4.2 4.1   CHLORIDE 96 91* 89*   CO2 23 26 25   GLUCOSE 96 103* 98   BUN 12 17 18   CREATININE 1.17 1.21 1.20   CALCIUM 9.3 9.7 9.9                   Imaging  US-INTRACRANIAL ARTERY LIMITED   Final Result      US-INTRACRANIAL ARTERY LIMITED   Final Result      US-INTRACRANIAL ARTERY LIMITED   Final Result      US-INTRACRANIAL ARTERY LIMITED   Final Result      US-INTRACRANIAL ARTERY LIMITED   Final Result      US-INTRACRANIAL ARTERY LIMITED   Final Result      US-INTRACRANIAL ARTERY LIMITED   Final Result      US-INTRACRANIAL ARTERY LIMITED   Final Result      US-INTRACRANIAL ARTERY LIMITED   Final Result      US-INTRACRANIAL ARTERY LIMITED   Final Result      CT-HEAD W/O   Final Result      1.  No CT evidence of acute infarct, hemorrhage or mass.   2.  Right suprasellar aneurysm coil.      EC-ECHOCARDIOGRAM COMPLETE W/O CONT   Final Result      US-INTRACRANIAL ARTERY COMP   Final Result      IR-EMBOLIZATION   Final Result   Addendum 1 of 1   Addendum:       Used coils:   First coil:Microvention [place] Soft 10 Endovascular Embolization Coil 3D    [id number] placed in the Right PCOM. Ref 1246-2225. Lot [id number]   ?   Second coil:   Microvention Hydro Soft 10 Endovascular Embolization Coil 3D [id number]    placed in the Right PCOM. Ref [id number]. Lot [id number]      coil which is not used:      Hydrocoil 10 [id number] LOT no:[id number]      Final      CT-CTA HEAD WITH & W/O-POST PROCESS   Final Result         1.  3.1 mm right aneurysm, likely from the supraclinoid segment.   2.  Right sylvian fissure, frontal, and bilateral basal cistern, appearance compatible subarachnoid hemorrhage.      These findings were discussed with the patient's clinician, Daniel Leiva, on 7/20/2020 9:51 PM.      CT-CTA NECK WITH & W/O-POST PROCESSING   Final Result         1.  CT angiogram of the neck within normal limits.              Assessment/Plan  Pain, chronic- (present on admission)  Assessment & Plan  Pain control       VTE prophylaxis: Heparin

## 2020-08-01 NOTE — CARE PLAN
Problem: Pain Management  Goal: Pain level will decrease to patient's comfort goal  Outcome: PROGRESSING AS EXPECTED     Problem: Fluid Volume:  Goal: Will maintain balanced intake and output  Outcome: PROGRESSING AS EXPECTED     Problem: Psychosocial Needs:  Goal: Level of anxiety will decrease  Outcome: PROGRESSING AS EXPECTED     Problem: Communication  Goal: The ability to communicate needs accurately and effectively will improve  Outcome: PROGRESSING AS EXPECTED     Problem: Safety  Goal: Will remain free from injury  Outcome: PROGRESSING AS EXPECTED  Goal: Will remain free from falls  Outcome: PROGRESSING AS EXPECTED

## 2020-08-01 NOTE — PROGRESS NOTES
Pt states that she has no appetite. No co pain.  VS stable. LBM 30th.  Voiding qs. R/a, iv intact.  r groin insertion healed.    Moved to 186-1

## 2020-08-01 NOTE — ASSESSMENT & PLAN NOTE
I have requested that the neurosurgical team reevaluate the patient in terms of her multiple complaints to ensure that there are no further intervention is necessary.

## 2020-08-01 NOTE — PROGRESS NOTES
"[facility] Medicine Daily Progress Note    Date of Service  8/1/2020    Chief Complaint  40 y.o. female admitted 7/20/2020 with SAH.    Hospital Course  40 y.o. 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  Interventional radiology was called on the case as well as neurosurgery, patient underwent endovascular repair of the PCOM aneurysm.  Repeat CT scan shows no increased hemorrhage.  TCD's have been negative so far.  Patient with known history of hypertension, admits to noncompliance with taking her medications.  Hypertension currently well controlled.  Care and neurosurgery have cleared her for transfer from the ICU.    Interval Problem Update  Over the past 5 days this has multiple systemic complaints of anxiety, headache, muscle spasm, dyspepsia, depression.  Today she is complaining of worsening headache with bitemporal discomfort \"behind my eyes\", as well as a new left-sided numbness.    Consultants/Specialty  Critical care  Neurosurgery    Code Status  Full    Disposition  Will recheck head CT, reconsult neurosurgery.    Review of Systems  Review of Systems   Constitutional: Negative for chills, diaphoresis, fever and malaise/fatigue.   HENT: Negative for congestion, hearing loss and sore throat.    Eyes: Negative for blurred vision.   Respiratory: Negative for cough, shortness of breath and wheezing.    Cardiovascular: Negative for chest pain, palpitations and leg swelling.   Gastrointestinal: Negative for abdominal pain, diarrhea, heartburn, nausea and vomiting.   Genitourinary: Negative for dysuria, flank pain and hematuria.   Musculoskeletal: Negative for back pain, joint pain, myalgias and neck pain.   Skin: Negative for rash.   Neurological: Positive for weakness and headaches. Negative for dizziness, sensory change, speech change and focal weakness.   Psychiatric/Behavioral: The patient is nervous/anxious.         Physical Exam  Temp:  [36.5 °C (97.7 " °F)-36.7 °C (98.1 °F)] 36.7 °C (98.1 °F)  Pulse:  [82-93] 89  Resp:  [16-18] 16  BP: ()/(60-67) 112/60  SpO2:  [95 %-98 %] 98 %    Physical Exam  Vitals signs and nursing note reviewed.   HENT:      Head: Normocephalic and atraumatic.      Nose: No congestion.      Mouth/Throat:      Mouth: Mucous membranes are moist.   Eyes:      Extraocular Movements: Extraocular movements intact.      Conjunctiva/sclera: Conjunctivae normal.      Pupils: Pupils are equal, round, and reactive to light.   Neck:      Musculoskeletal: No muscular tenderness.   Cardiovascular:      Rate and Rhythm: Normal rate and regular rhythm.   Pulmonary:      Effort: Pulmonary effort is normal.      Breath sounds: Normal breath sounds.   Abdominal:      General: Bowel sounds are normal. There is no distension.      Palpations: Abdomen is soft.      Tenderness: There is no abdominal tenderness. There is no guarding or rebound.   Musculoskeletal:      Right lower leg: No edema.      Left lower leg: No edema.   Lymphadenopathy:      Cervical: No cervical adenopathy.   Skin:     General: Skin is warm and dry.   Neurological:      General: No focal deficit present.      Mental Status: She is alert and oriented to person, place, and time.      Cranial Nerves: No cranial nerve deficit.         Current Facility-Administered Medications:   •  oxyCODONE-acetaminophen (PERCOCET-10)  MG per tablet 1 Tab, 1 Tab, Oral, Q4HRS PRN, Pepito Lewis M.D., 1 Tab at 08/01/20 1329  •  citalopram (CELEXA) tablet 40 mg, 40 mg, Oral, DAILY, Pepito Lewis M.D., 40 mg at 08/01/20 0450  •  hydroCHLOROthiazide (HYDRODIURIL) tablet 25 mg, 25 mg, Oral, DAILY, Pepito Lewis M.D., Stopped at 08/01/20 0439  •  metoprolol SR (TOPROL XL) tablet 25 mg, 25 mg, Oral, DAILY, Pepito Lewis M.D., Stopped at 08/01/20 0440  •  cyclobenzaprine (FLEXERIL) tablet 10 mg, 10 mg, Oral, TID PRN, Pepito Lewis M.D., 10 mg at 07/31/20 1352  •  ALPRAZolam (XANAX) tablet 0.5 mg,  0.5 mg, Oral, TID PRN, Pepito Lewis M.D., 0.5 mg at 07/30/20 0918  •  morphine (pf) 4 MG/ML injection 4 mg, 4 mg, Intravenous, Q4HRS PRN, Mingo Velasquez M.D., 4 mg at 07/30/20 0007  •  hydrALAZINE (APRESOLINE) tablet 25 mg, 25 mg, Oral, Q8HRS, Mingo Velasquez M.D., 25 mg at 08/01/20 1327  •  sodium chloride (SALT) tablet 1 g, 1 g, Oral, TID WITH MEALS, Mingo Velasquez M.D., 1 g at 08/01/20 1158  •  calcium carbonate (TUMS) chewable tab 500 mg, 500 mg, Oral, DAILY, Norm Marie M.D., 500 mg at 08/01/20 0451  •  heparin injection 5,000 Units, 5,000 Units, Subcutaneous, Q8HRS, Cristiano Carlisle M.D., 5,000 Units at 08/01/20 1328  •  albuterol inhaler 2 Puff, 2 Puff, Inhalation, Q4H PRN (RT), Norm Marie M.D.  •  amLODIPine (NORVASC) tablet 10 mg, 10 mg, Oral, DAILY, Cristiano Carlisle M.D., Stopped at 08/01/20 0439  •  lidocaine (LIDODERM) 5 % 1 Patch, 1 Patch, Transdermal, Q24HR, Cristiano Carlisle M.D., 1 Patch at 08/01/20 1327  •  gabapentin (NEURONTIN) capsule 300 mg, 300 mg, Oral, TID, Cristiano Carlisle M.D., 300 mg at 08/01/20 1158  •  senna-docusate (PERICOLACE or SENOKOT S) 8.6-50 MG per tablet 2 Tab, 2 Tab, Oral, BID, 2 Tab at 08/01/20 0451 **AND** polyethylene glycol/lytes (MIRALAX) PACKET 1 Packet, 1 Packet, Oral, QDAY PRN, 1 Packet at 07/27/20 2109 **AND** magnesium hydroxide (MILK OF MAGNESIA) suspension 30 mL, 30 mL, Oral, QDAY PRN **AND** bisacodyl (DULCOLAX) suppository 10 mg, 10 mg, Rectal, QDAY PRN, Norm Marie M.D.  •  Respiratory Therapy Consult, , Nebulization, Continuous RT, Norm Marie M.D.  •  acetaminophen (TYLENOL) tablet 650 mg, 650 mg, Oral, Q6HRS PRN, Norm Marie M.D., 650 mg at 07/29/20 0543  •  labetalol (NORMODYNE/TRANDATE) injection 10 mg, 10 mg, Intravenous, Q4HRS PRN, Norm Marie M.D., 10 mg at 07/21/20 0204  •  ondansetron (ZOFRAN) syringe/vial injection 4 mg, 4 mg, Intravenous, Q4HRS PRN, Norm Marie M.D., 4 mg at 07/30/20 0545  •  ondansetron (ZOFRAN ODT)  dispertab 4 mg, 4 mg, Oral, Q4HRS PRN, Norm Marie M.D.  •  promethazine (PHENERGAN) tablet 12.5-25 mg, 12.5-25 mg, Oral, Q4HRS PRN, Norm Marie M.D.  •  promethazine (PHENERGAN) suppository 12.5-25 mg, 12.5-25 mg, Rectal, Q4HRS PRN, Norm Marie M.D.  •  prochlorperazine (COMPAZINE) injection 5-10 mg, 5-10 mg, Intravenous, Q4HRS PRN, Norm Marie M.D.  •  niMODipine (NIMOTOP) capsule 60 mg, 60 mg, Oral, Q4HRS, Cristiano Carlisle M.D., 60 mg at 08/01/20 1328      Fluids  No intake or output data in the 24 hours ending 08/01/20 1639    Laboratory      Recent Labs     07/30/20  0504 07/31/20  0352 08/01/20  0307   SODIUM 130* 128* 131*   POTASSIUM 4.2 4.1 3.6   CHLORIDE 91* 89* 90*   CO2 26 25 27   GLUCOSE 103* 98 101*   BUN 17 18 19   CREATININE 1.21 1.20 1.19   CALCIUM 9.7 9.9 9.5                   Imaging  US-INTRACRANIAL ARTERY LIMITED   Final Result      US-INTRACRANIAL ARTERY LIMITED   Final Result      US-INTRACRANIAL ARTERY LIMITED   Final Result      US-INTRACRANIAL ARTERY LIMITED   Final Result      US-INTRACRANIAL ARTERY LIMITED   Final Result      US-INTRACRANIAL ARTERY LIMITED   Final Result      US-INTRACRANIAL ARTERY LIMITED   Final Result      US-INTRACRANIAL ARTERY LIMITED   Final Result      US-INTRACRANIAL ARTERY LIMITED   Final Result      US-INTRACRANIAL ARTERY LIMITED   Final Result      CT-HEAD W/O   Final Result      1.  No CT evidence of acute infarct, hemorrhage or mass.   2.  Right suprasellar aneurysm coil.      EC-ECHOCARDIOGRAM COMPLETE W/O CONT   Final Result      US-INTRACRANIAL ARTERY COMP   Final Result      IR-EMBOLIZATION   Final Result   Addendum 1 of 1   Addendum:      Used coils:   First coil:Microvention [place] Soft 10 Endovascular Embolization Coil 3D    2.4kgc7eu placed in the Right PCOM. Ref 0392-0859. Lot [id number]   ?   Second coil:   Microvention Hydro Soft 10 Endovascular Embolization Coil 3D 1.6dlv9ic    placed in the Right PCOM. Ref [id number]. Lot  [id number]      coil which is not used:      Hydrocoil 10 [id number] LOT no:[id number]      Final      CT-CTA HEAD WITH & W/O-POST PROCESS   Final Result         1.  3.1 mm right aneurysm, likely from the supraclinoid segment.   2.  Right sylvian fissure, frontal, and bilateral basal cistern, appearance compatible subarachnoid hemorrhage.      These findings were discussed with the patient's clinician, Daniel Leiva, on 7/20/2020 9:51 PM.      CT-CTA NECK WITH & W/O-POST PROCESSING   Final Result         1.  CT angiogram of the neck within normal limits.         CT-HEAD WITH & W/O    (Results Pending)        Assessment/Plan  Pain, chronic- (present on admission)  Assessment & Plan  Pain control    Aneurysm (HCC)  Assessment & Plan  I have requested that the neurosurgical team reevaluate the patient in terms of her multiple complaints to ensure that there are no further intervention is necessary.       VTE prophylaxis: Heparin

## 2020-08-01 NOTE — PROGRESS NOTES
Discussed dc plan with pt, pt verbalizes fear for being discharged. Pt does not feel safe to dc home with limited help as pain is a barrier to her mobility. Relayed concerns with Dr. Lewis. Plan to consult neuro and re-eval with therapy.

## 2020-08-02 ENCOUNTER — APPOINTMENT (OUTPATIENT)
Dept: RADIOLOGY | Facility: MEDICAL CENTER | Age: 40
DRG: 021 | End: 2020-08-02
Attending: HOSPITALIST

## 2020-08-02 LAB
ANION GAP SERPL CALC-SCNC: 11 MMOL/L (ref 7–16)
BUN SERPL-MCNC: 19 MG/DL (ref 8–22)
CALCIUM SERPL-MCNC: 9.4 MG/DL (ref 8.5–10.5)
CHLORIDE SERPL-SCNC: 93 MMOL/L (ref 96–112)
CO2 SERPL-SCNC: 31 MMOL/L (ref 20–33)
CREAT SERPL-MCNC: 1.09 MG/DL (ref 0.5–1.4)
GLUCOSE SERPL-MCNC: 92 MG/DL (ref 65–99)
POTASSIUM SERPL-SCNC: 3.9 MMOL/L (ref 3.6–5.5)
SODIUM SERPL-SCNC: 135 MMOL/L (ref 135–145)

## 2020-08-02 PROCEDURE — 700102 HCHG RX REV CODE 250 W/ 637 OVERRIDE(OP): Performed by: PSYCHIATRY & NEUROLOGY

## 2020-08-02 PROCEDURE — 700102 HCHG RX REV CODE 250 W/ 637 OVERRIDE(OP): Performed by: INTERNAL MEDICINE

## 2020-08-02 PROCEDURE — 80048 BASIC METABOLIC PNL TOTAL CA: CPT

## 2020-08-02 PROCEDURE — 700111 HCHG RX REV CODE 636 W/ 250 OVERRIDE (IP): Performed by: INTERNAL MEDICINE

## 2020-08-02 PROCEDURE — 99232 SBSQ HOSP IP/OBS MODERATE 35: CPT | Performed by: HOSPITALIST

## 2020-08-02 PROCEDURE — 700101 HCHG RX REV CODE 250: Performed by: PSYCHIATRY & NEUROLOGY

## 2020-08-02 PROCEDURE — A9270 NON-COVERED ITEM OR SERVICE: HCPCS | Performed by: PSYCHIATRY & NEUROLOGY

## 2020-08-02 PROCEDURE — A9270 NON-COVERED ITEM OR SERVICE: HCPCS | Performed by: HOSPITALIST

## 2020-08-02 PROCEDURE — A9270 NON-COVERED ITEM OR SERVICE: HCPCS | Performed by: INTERNAL MEDICINE

## 2020-08-02 PROCEDURE — 770006 HCHG ROOM/CARE - MED/SURG/GYN SEMI*

## 2020-08-02 PROCEDURE — 700111 HCHG RX REV CODE 636 W/ 250 OVERRIDE (IP): Performed by: PSYCHIATRY & NEUROLOGY

## 2020-08-02 PROCEDURE — 70551 MRI BRAIN STEM W/O DYE: CPT

## 2020-08-02 PROCEDURE — 36415 COLL VENOUS BLD VENIPUNCTURE: CPT

## 2020-08-02 PROCEDURE — 700102 HCHG RX REV CODE 250 W/ 637 OVERRIDE(OP): Performed by: HOSPITALIST

## 2020-08-02 RX ADMIN — GABAPENTIN 300 MG: 300 CAPSULE ORAL at 10:16

## 2020-08-02 RX ADMIN — NIMODIPINE 60 MG: 30 CAPSULE ORAL at 15:13

## 2020-08-02 RX ADMIN — HYDRALAZINE HYDROCHLORIDE 25 MG: 25 TABLET, FILM COATED ORAL at 04:57

## 2020-08-02 RX ADMIN — OXYCODONE HYDROCHLORIDE AND ACETAMINOPHEN 1 TABLET: 10; 325 TABLET ORAL at 01:19

## 2020-08-02 RX ADMIN — NIMODIPINE 60 MG: 30 CAPSULE ORAL at 10:16

## 2020-08-02 RX ADMIN — HEPARIN SODIUM 5000 UNITS: 5000 INJECTION, SOLUTION INTRAVENOUS; SUBCUTANEOUS at 04:57

## 2020-08-02 RX ADMIN — MORPHINE SULFATE 4 MG: 4 INJECTION INTRAVENOUS at 15:14

## 2020-08-02 RX ADMIN — HEPARIN SODIUM 5000 UNITS: 5000 INJECTION, SOLUTION INTRAVENOUS; SUBCUTANEOUS at 21:42

## 2020-08-02 RX ADMIN — METOPROLOL SUCCINATE 25 MG: 25 TABLET, EXTENDED RELEASE ORAL at 04:56

## 2020-08-02 RX ADMIN — HEPARIN SODIUM 5000 UNITS: 5000 INJECTION, SOLUTION INTRAVENOUS; SUBCUTANEOUS at 15:13

## 2020-08-02 RX ADMIN — CITALOPRAM HYDROBROMIDE 40 MG: 20 TABLET ORAL at 04:57

## 2020-08-02 RX ADMIN — MORPHINE SULFATE 4 MG: 4 INJECTION INTRAVENOUS at 04:57

## 2020-08-02 RX ADMIN — OXYCODONE HYDROCHLORIDE AND ACETAMINOPHEN 1 TABLET: 10; 325 TABLET ORAL at 12:33

## 2020-08-02 RX ADMIN — GABAPENTIN 300 MG: 300 CAPSULE ORAL at 18:02

## 2020-08-02 RX ADMIN — NIMODIPINE 60 MG: 30 CAPSULE ORAL at 21:42

## 2020-08-02 RX ADMIN — HYDRALAZINE HYDROCHLORIDE 25 MG: 25 TABLET, FILM COATED ORAL at 15:13

## 2020-08-02 RX ADMIN — Medication 1 G: at 18:02

## 2020-08-02 RX ADMIN — CYCLOBENZAPRINE 10 MG: 10 TABLET, FILM COATED ORAL at 21:42

## 2020-08-02 RX ADMIN — DOCUSATE SODIUM 50 MG AND SENNOSIDES 8.6 MG 2 TABLET: 8.6; 5 TABLET, FILM COATED ORAL at 18:02

## 2020-08-02 RX ADMIN — DOCUSATE SODIUM 50 MG AND SENNOSIDES 8.6 MG 2 TABLET: 8.6; 5 TABLET, FILM COATED ORAL at 04:56

## 2020-08-02 RX ADMIN — GABAPENTIN 300 MG: 300 CAPSULE ORAL at 04:56

## 2020-08-02 RX ADMIN — NIMODIPINE 60 MG: 30 CAPSULE ORAL at 18:02

## 2020-08-02 RX ADMIN — Medication 1 G: at 09:38

## 2020-08-02 RX ADMIN — HYDRALAZINE HYDROCHLORIDE 25 MG: 25 TABLET, FILM COATED ORAL at 21:42

## 2020-08-02 RX ADMIN — LIDOCAINE 1 PATCH: 50 PATCH TOPICAL at 15:14

## 2020-08-02 RX ADMIN — NIMODIPINE 60 MG: 30 CAPSULE ORAL at 04:57

## 2020-08-02 RX ADMIN — NIMODIPINE 60 MG: 30 CAPSULE ORAL at 01:19

## 2020-08-02 RX ADMIN — ANTACID TABLETS 500 MG: 500 TABLET, CHEWABLE ORAL at 04:57

## 2020-08-02 RX ADMIN — OXYCODONE HYDROCHLORIDE AND ACETAMINOPHEN 1 TABLET: 10; 325 TABLET ORAL at 21:41

## 2020-08-02 RX ADMIN — Medication 1 G: at 12:33

## 2020-08-02 ASSESSMENT — ENCOUNTER SYMPTOMS
HEARTBURN: 0
NECK PAIN: 0
NAUSEA: 0
FEVER: 0
WEAKNESS: 1
CHILLS: 0
DIZZINESS: 0
WHEEZING: 0
ABDOMINAL PAIN: 0
FLANK PAIN: 0
VOMITING: 0
SENSORY CHANGE: 0
PALPITATIONS: 0
DIAPHORESIS: 0
BACK PAIN: 0
FOCAL WEAKNESS: 0
DIARRHEA: 0
BLURRED VISION: 0
COUGH: 0
MYALGIAS: 0
NERVOUS/ANXIOUS: 1
SORE THROAT: 0
SPEECH CHANGE: 0
SHORTNESS OF BREATH: 0
HEADACHES: 1

## 2020-08-02 ASSESSMENT — FIBROSIS 4 INDEX: FIB4 SCORE: 0.43

## 2020-08-02 NOTE — PROGRESS NOTES
[facility] Daily Progress Note    Date of Service  8/2/2020    Chief Complaint  40 y.o. female admitted 7/20/2020 with SAH.    Hospital Course  40 y.o. 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  Interventional radiology was called on the case as well as neurosurgery, patient underwent endovascular repair of the PCOM aneurysm.  Repeat CT scan shows no increased hemorrhage.  TCD's have been negative so far.  Patient with known history of hypertension, admits to noncompliance with taking her medications.  Hypertension currently well controlled.  Care and neurosurgery have cleared her for transfer from the ICU.    Interval Problem Update  No acute overnight events, greatly appreciate the assistance of the neurosurgical team and reevaluating patient as she approaches her discharge time.    Consultants/Specialty  Critical care  Neurosurgery    Code Status  Full    Disposition  Awaiting MRI brain read, likely stable for discharge to self-care morning.    Review of Systems  Review of Systems   Constitutional: Negative for chills, diaphoresis, fever and malaise/fatigue.   HENT: Negative for congestion, hearing loss and sore throat.    Eyes: Negative for blurred vision.   Respiratory: Negative for cough, shortness of breath and wheezing.    Cardiovascular: Negative for chest pain, palpitations and leg swelling.   Gastrointestinal: Negative for abdominal pain, diarrhea, heartburn, nausea and vomiting.   Genitourinary: Negative for dysuria, flank pain and hematuria.   Musculoskeletal: Negative for back pain, joint pain, myalgias and neck pain.   Skin: Negative for rash.   Neurological: Positive for weakness and headaches. Negative for dizziness, sensory change, speech change and focal weakness.   Psychiatric/Behavioral: The patient is nervous/anxious.         Physical Exam  Temp:  [36.4 °C (97.5 °F)-36.6 °C (97.9 °F)] 36.4 °C (97.6 °F)  Pulse:  [] 76  Resp:   [16-18] 16  BP: ()/(53-75) 116/64  SpO2:  [90 %-98 %] 97 %    Physical Exam  Vitals signs and nursing note reviewed.   HENT:      Head: Normocephalic and atraumatic.      Nose: No congestion.      Mouth/Throat:      Mouth: Mucous membranes are moist.   Eyes:      Extraocular Movements: Extraocular movements intact.      Conjunctiva/sclera: Conjunctivae normal.      Pupils: Pupils are equal, round, and reactive to light.   Neck:      Musculoskeletal: No muscular tenderness.   Cardiovascular:      Rate and Rhythm: Normal rate and regular rhythm.   Pulmonary:      Effort: Pulmonary effort is normal.      Breath sounds: Normal breath sounds.   Abdominal:      General: Bowel sounds are normal. There is no distension.      Palpations: Abdomen is soft.      Tenderness: There is no abdominal tenderness. There is no guarding or rebound.   Musculoskeletal:      Right lower leg: No edema.      Left lower leg: No edema.   Lymphadenopathy:      Cervical: No cervical adenopathy.   Skin:     General: Skin is warm and dry.   Neurological:      General: No focal deficit present.      Mental Status: She is alert and oriented to person, place, and time.      Cranial Nerves: No cranial nerve deficit.         Current Facility-Administered Medications:   •  oxyCODONE-acetaminophen (PERCOCET-10)  MG per tablet 1 Tab, 1 Tab, Oral, Q4HRS PRN, Pepito Lewis M.D., 1 Tab at 08/02/20 1233  •  citalopram (CELEXA) tablet 40 mg, 40 mg, Oral, DAILY, Pepito Lewis M.D., 40 mg at 08/02/20 0457  •  hydroCHLOROthiazide (HYDRODIURIL) tablet 25 mg, 25 mg, Oral, DAILY, Pepito Lewis M.D., Stopped at 08/01/20 0439  •  metoprolol SR (TOPROL XL) tablet 25 mg, 25 mg, Oral, DAILY, Pepito Lewis M.D., 25 mg at 08/02/20 0456  •  cyclobenzaprine (FLEXERIL) tablet 10 mg, 10 mg, Oral, TID PRN, Pepito Lewis M.D., 10 mg at 08/01/20 2003  •  ALPRAZolam (XANAX) tablet 0.5 mg, 0.5 mg, Oral, TID PRN, Pepito Lewis M.D., 0.5 mg at 07/30/20 0918  •   morphine (pf) 4 MG/ML injection 4 mg, 4 mg, Intravenous, Q4HRS PRN, Mingo Velasquez M.D., 4 mg at 08/02/20 1514  •  hydrALAZINE (APRESOLINE) tablet 25 mg, 25 mg, Oral, Q8HRS, Mingo Velasquez M.D., 25 mg at 08/02/20 1513  •  sodium chloride (SALT) tablet 1 g, 1 g, Oral, TID WITH MEALS, Mingo Velasquez M.D., 1 g at 08/02/20 1233  •  calcium carbonate (TUMS) chewable tab 500 mg, 500 mg, Oral, DAILY, Norm Marie M.D., 500 mg at 08/02/20 0457  •  heparin injection 5,000 Units, 5,000 Units, Subcutaneous, Q8HRS, Cristiano Carlisle M.D., 5,000 Units at 08/02/20 1513  •  albuterol inhaler 2 Puff, 2 Puff, Inhalation, Q4H PRN (RT), Norm Marie M.D.  •  amLODIPine (NORVASC) tablet 10 mg, 10 mg, Oral, DAILY, Cristiano Carlisle M.D., Stopped at 08/01/20 0439  •  lidocaine (LIDODERM) 5 % 1 Patch, 1 Patch, Transdermal, Q24HR, Cristiano Carlisle M.D., 1 Patch at 08/02/20 1514  •  gabapentin (NEURONTIN) capsule 300 mg, 300 mg, Oral, TID, Cristiano Carlisle M.D., 300 mg at 08/02/20 1016  •  senna-docusate (PERICOLACE or SENOKOT S) 8.6-50 MG per tablet 2 Tab, 2 Tab, Oral, BID, 2 Tab at 08/02/20 0456 **AND** polyethylene glycol/lytes (MIRALAX) PACKET 1 Packet, 1 Packet, Oral, QDAY PRN, 1 Packet at 07/27/20 2109 **AND** magnesium hydroxide (MILK OF MAGNESIA) suspension 30 mL, 30 mL, Oral, QDAY PRN **AND** bisacodyl (DULCOLAX) suppository 10 mg, 10 mg, Rectal, QDAY PRN, Norm Marie M.D.  •  Respiratory Therapy Consult, , Nebulization, Continuous RT, Norm Marie M.D.  •  acetaminophen (TYLENOL) tablet 650 mg, 650 mg, Oral, Q6HRS PRN, Norm Marie M.D., 650 mg at 07/29/20 0543  •  labetalol (NORMODYNE/TRANDATE) injection 10 mg, 10 mg, Intravenous, Q4HRS PRN, Norm Marie M.D., 10 mg at 07/21/20 0204  •  ondansetron (ZOFRAN) syringe/vial injection 4 mg, 4 mg, Intravenous, Q4HRS PRN, Norm Marie M.D., 4 mg at 07/30/20 0545  •  ondansetron (ZOFRAN ODT) dispertab 4 mg, 4 mg, Oral, Q4HRS PRN, Norm Marie M.D.  •   promethazine (PHENERGAN) tablet 12.5-25 mg, 12.5-25 mg, Oral, Q4HRS PRN, Norm Marie M.D.  •  promethazine (PHENERGAN) suppository 12.5-25 mg, 12.5-25 mg, Rectal, Q4HRS PRN, Norm Marie M.D.  •  prochlorperazine (COMPAZINE) injection 5-10 mg, 5-10 mg, Intravenous, Q4HRS PRN, Norm Marie M.D.  •  niMODipine (NIMOTOP) capsule 60 mg, 60 mg, Oral, Q4HRS, Cristiano Carlisle M.D., 60 mg at 08/02/20 1513      Fluids  No intake or output data in the 24 hours ending 08/02/20 1547    Laboratory      Recent Labs     07/31/20  0352 08/01/20  0307 08/02/20  0610   SODIUM 128* 131* 135   POTASSIUM 4.1 3.6 3.9   CHLORIDE 89* 90* 93*   CO2 25 27 31   GLUCOSE 98 101* 92   BUN 18 19 19   CREATININE 1.20 1.19 1.09   CALCIUM 9.9 9.5 9.4                   Imaging  MR-BRAIN-W/O   Final Result      1.  Extremely limited study due to magnetic susceptibility artifact overlying the right posterior skull base, occipital and parietal lobes as well as motion artifact.   2.  No definite acute intracranial abnormality.   3.  Minimal microangiopathic ischemic change versus demyelination or gliosis.      CT-HEAD W/O   Final Result         1. No acute intracranial abnormality. No evidence of acute intracranial hemorrhage or mass lesion.               US-INTRACRANIAL ARTERY LIMITED   Final Result      US-INTRACRANIAL ARTERY LIMITED   Final Result      US-INTRACRANIAL ARTERY LIMITED   Final Result      US-INTRACRANIAL ARTERY LIMITED   Final Result      US-INTRACRANIAL ARTERY LIMITED   Final Result      US-INTRACRANIAL ARTERY LIMITED   Final Result      US-INTRACRANIAL ARTERY LIMITED   Final Result      US-INTRACRANIAL ARTERY LIMITED   Final Result      US-INTRACRANIAL ARTERY LIMITED   Final Result      US-INTRACRANIAL ARTERY LIMITED   Final Result      CT-HEAD W/O   Final Result      1.  No CT evidence of acute infarct, hemorrhage or mass.   2.  Right suprasellar aneurysm coil.      EC-ECHOCARDIOGRAM COMPLETE W/O CONT   Final Result       US-INTRACRANIAL ARTERY COMP   Final Result      IR-EMBOLIZATION   Final Result   Addendum 1 of 1   Addendum:      Used coils:   First coil:Microvention [place] Soft 10 Endovascular Embolization Coil 3D    [id number] placed in the Right PCOM. Ref 6916-3952. Lot [id number]   ?   Second coil:   Microvention Hydro Soft 10 Endovascular Embolization Coil 3D [id number]    placed in the Right PCOM. Ref 2880-4621. Lot [id number]      coil which is not used:      Hydrocoil 10 [id number] LOT no:[id number]      Final      CT-CTA HEAD WITH & W/O-POST PROCESS   Final Result         1.  3.1 mm right aneurysm, likely from the supraclinoid segment.   2.  Right sylvian fissure, frontal, and bilateral basal cistern, appearance compatible subarachnoid hemorrhage.      These findings were discussed with the patient's clinician, Daniel Leiva, on 7/20/2020 9:51 PM.      CT-CTA NECK WITH & W/O-POST PROCESSING   Final Result         1.  CT angiogram of the neck within normal limits.              Assessment/Plan  Pain, chronic- (present on admission)  Assessment & Plan  Pain control    Aneurysm (HCC)  Assessment & Plan  I have requested that the neurosurgical team reevaluate the patient in terms of her multiple complaints to ensure that there are no further intervention is necessary.       VTE prophylaxis: Heparin

## 2020-08-02 NOTE — PROGRESS NOTES
Neurosurgery Progress Note    Subjective:  Pt complains of left sided headaches  Pt complains of left hip and leg pain that began 5 days ago. The patient states that she has burning pain down the lateral aspect of her left leg    Exam:  A&Ox4  NAD  RA, normal respiratory effort  Follows commands x4  PERRLA. EOMI. Pupils 3  Face is symmetrical, tongue is midline  CN II-XII grossly intact bilat  No difficulty with speech  Negative pronator drift test    NM: 5/5 hip flexion, knee extension, knee flexion, plantar flexion, dorsiflexion, EHL  Sensation intact and equal throughout all four extremities. Pain with light touch of left lower extremity in non-dermatomal pattern  Abdomen: soft, non-tender  Non-labored breathing on room air, normal respiratory effort  Capillary refill in all four extremities <2 seconds  No LE edema, erythema, cyanosis, clubbing  Calves non-tender to compression bilat          BP  Min: 97/57  Max: 119/68  Pulse  Av.6  Min: 60  Max: 115  Resp  Av.3  Min: 16  Max: 18  Temp  Av.5 °C (97.7 °F)  Min: 36.4 °C (97.5 °F)  Max: 36.6 °C (97.9 °F)  SpO2  Av.5 %  Min: 90 %  Max: 98 %    No data recorded        Recent Labs     20  0352 20  0307 20  0610   SODIUM 128* 131* 135   POTASSIUM 4.1 3.6 3.9   CHLORIDE 89* 90* 93*   CO2 25 27 31   GLUCOSE 98 101* 92   BUN 18 19 19   CREATININE 1.20 1.19 1.09   CALCIUM 9.9 9.5 9.4               Intake/Output       20 - 20 0659 20 - 20 0659       Total  Total       Intake    Total Intake -- -- -- -- -- --       Output    Urine  --  -- --  --  -- --    Number of Times Voided 1 x -- 1 x -- -- --    Total Output -- -- -- -- -- --       Net I/O     -- -- -- -- -- --          No intake or output data in the 24 hours ending 20 1003         • oxyCODONE-acetaminophen  1 Tab Q4HRS PRN   • citalopram  40 mg DAILY   • hydroCHLOROthiazide  25 mg DAILY   • metoprolol SR   25 mg DAILY   • cyclobenzaprine  10 mg TID PRN   • ALPRAZolam  0.5 mg TID PRN   • morphine injection  4 mg Q4HRS PRN   • hydrALAZINE  25 mg Q8HRS   • sodium chloride  1 g TID WITH MEALS   • calcium carbonate  500 mg DAILY   • heparin  5,000 Units Q8HRS   • albuterol  2 Puff Q4H PRN (RT)   • amLODIPine  10 mg DAILY   • lidocaine  1 Patch Q24HR   • gabapentin  300 mg TID   • senna-docusate  2 Tab BID    And   • polyethylene glycol/lytes  1 Packet QDAY PRN    And   • magnesium hydroxide  30 mL QDAY PRN    And   • bisacodyl  10 mg QDAY PRN   • Respiratory Therapy Consult   Continuous RT   • acetaminophen  650 mg Q6HRS PRN   • labetalol  10 mg Q4HRS PRN   • ondansetron  4 mg Q4HRS PRN   • ondansetron  4 mg Q4HRS PRN   • promethazine  12.5-25 mg Q4HRS PRN   • promethazine  12.5-25 mg Q4HRS PRN   • prochlorperazine  5-10 mg Q4HRS PRN   • niMODipine  60 mg Q4HRS       Assessment and Plan:  Hospital day # 12  Pt complains of left sided headache and left lower extremity pain.  Pt is neuro intact on physical exam.  Pt's CT head and TCDs reviewed. MRI brain to be ordered at Primary team discretion.  No neurosurgical intervention planned with regard to aneurysm.  Recommend work up of lower extremity complaints on an outpatient basis.  Pt cleared for discharge from NS standpoint                  Case discussed with Dr. Hanks

## 2020-08-02 NOTE — CARE PLAN
Problem: Safety  Goal: Will remain free from falls  Outcome: PROGRESSING AS EXPECTED  Note: bed alarm on, walker out of sight, nonskid socks on, call light within reach, personal belongings within reach, toileting offered.          Problem: Pain Management  Goal: Pain level will decrease to patient's comfort goal  Outcome: PROGRESSING SLOWER THAN EXPECTED  Note: Frequent pain assessments in place, Medicating per MAR, provided heat packs and non pharmacological interventions to reduce pain.

## 2020-08-02 NOTE — CARE PLAN
Problem: Fluid Volume:  Goal: Will maintain balanced intake and output  Outcome: PROGRESSING AS EXPECTED     Problem: Psychosocial Needs:  Goal: Level of anxiety will decrease  Outcome: PROGRESSING AS EXPECTED     Problem: Communication  Goal: The ability to communicate needs accurately and effectively will improve  Outcome: PROGRESSING AS EXPECTED     Problem: Safety  Goal: Will remain free from falls  Outcome: PROGRESSING AS EXPECTED     Problem: Infection  Goal: Will remain free from infection  Outcome: PROGRESSING AS EXPECTED     Problem: Pain Management  Goal: Pain level will decrease to patient's comfort goal  Outcome: PROGRESSING SLOWER THAN EXPECTED

## 2020-08-03 VITALS
SYSTOLIC BLOOD PRESSURE: 97 MMHG | DIASTOLIC BLOOD PRESSURE: 57 MMHG | BODY MASS INDEX: 28.98 KG/M2 | HEART RATE: 75 BPM | HEIGHT: 64 IN | OXYGEN SATURATION: 96 % | RESPIRATION RATE: 17 BRPM | TEMPERATURE: 97.4 F | WEIGHT: 169.75 LBS

## 2020-08-03 LAB
ANION GAP SERPL CALC-SCNC: 12 MMOL/L (ref 7–16)
BUN SERPL-MCNC: 23 MG/DL (ref 8–22)
CALCIUM SERPL-MCNC: 9.3 MG/DL (ref 8.5–10.5)
CHLORIDE SERPL-SCNC: 96 MMOL/L (ref 96–112)
CO2 SERPL-SCNC: 28 MMOL/L (ref 20–33)
CREAT SERPL-MCNC: 1.22 MG/DL (ref 0.5–1.4)
GLUCOSE SERPL-MCNC: 85 MG/DL (ref 65–99)
POTASSIUM SERPL-SCNC: 4.5 MMOL/L (ref 3.6–5.5)
SODIUM SERPL-SCNC: 136 MMOL/L (ref 135–145)

## 2020-08-03 PROCEDURE — A9270 NON-COVERED ITEM OR SERVICE: HCPCS | Performed by: HOSPITALIST

## 2020-08-03 PROCEDURE — 700102 HCHG RX REV CODE 250 W/ 637 OVERRIDE(OP): Performed by: PSYCHIATRY & NEUROLOGY

## 2020-08-03 PROCEDURE — 700111 HCHG RX REV CODE 636 W/ 250 OVERRIDE (IP): Performed by: PSYCHIATRY & NEUROLOGY

## 2020-08-03 PROCEDURE — A9270 NON-COVERED ITEM OR SERVICE: HCPCS | Performed by: PSYCHIATRY & NEUROLOGY

## 2020-08-03 PROCEDURE — 700102 HCHG RX REV CODE 250 W/ 637 OVERRIDE(OP): Performed by: INTERNAL MEDICINE

## 2020-08-03 PROCEDURE — 700102 HCHG RX REV CODE 250 W/ 637 OVERRIDE(OP): Performed by: HOSPITALIST

## 2020-08-03 PROCEDURE — 99239 HOSP IP/OBS DSCHRG MGMT >30: CPT | Performed by: HOSPITALIST

## 2020-08-03 PROCEDURE — 36415 COLL VENOUS BLD VENIPUNCTURE: CPT

## 2020-08-03 PROCEDURE — 80048 BASIC METABOLIC PNL TOTAL CA: CPT

## 2020-08-03 PROCEDURE — A9270 NON-COVERED ITEM OR SERVICE: HCPCS | Performed by: INTERNAL MEDICINE

## 2020-08-03 RX ADMIN — HEPARIN SODIUM 5000 UNITS: 5000 INJECTION, SOLUTION INTRAVENOUS; SUBCUTANEOUS at 05:00

## 2020-08-03 RX ADMIN — OXYCODONE HYDROCHLORIDE AND ACETAMINOPHEN 1 TABLET: 10; 325 TABLET ORAL at 02:36

## 2020-08-03 RX ADMIN — DOCUSATE SODIUM 50 MG AND SENNOSIDES 8.6 MG 2 TABLET: 8.6; 5 TABLET, FILM COATED ORAL at 04:59

## 2020-08-03 RX ADMIN — ANTACID TABLETS 500 MG: 500 TABLET, CHEWABLE ORAL at 04:59

## 2020-08-03 RX ADMIN — CITALOPRAM HYDROBROMIDE 40 MG: 20 TABLET ORAL at 04:59

## 2020-08-03 RX ADMIN — NIMODIPINE 60 MG: 30 CAPSULE ORAL at 04:59

## 2020-08-03 RX ADMIN — NIMODIPINE 60 MG: 30 CAPSULE ORAL at 02:37

## 2020-08-03 RX ADMIN — GABAPENTIN 300 MG: 300 CAPSULE ORAL at 04:59

## 2020-08-03 RX ADMIN — HYDRALAZINE HYDROCHLORIDE 25 MG: 25 TABLET, FILM COATED ORAL at 04:59

## 2020-08-03 RX ADMIN — METOPROLOL SUCCINATE 25 MG: 25 TABLET, EXTENDED RELEASE ORAL at 05:00

## 2020-08-03 NOTE — DISCHARGE INSTRUCTIONS
Discharge Instructions    Discharged to home by car with relative. Discharged via wheelchair, hospital escort: Yes.  Special equipment needed: Not Applicable    Be sure to schedule a follow-up appointment with your primary care doctor or any specialists as instructed.     Discharge Plan:   Diet Plan: Discussed  Activity Level: Discussed  Smoking Cessation Offered: Patient Counseled  Confirmed Follow up Appointment: Patient to Call and Schedule Appointment  Confirmed Symptoms Management: Discussed  Medication Reconciliation Updated: Yes    I understand that a diet low in cholesterol, fat, and sodium is recommended for good health. Unless I have been given specific instructions below for another diet, I accept this instruction as my diet prescription.   Other diet: Regular    Special Instructions: None    · Is patient discharged on Warfarin / Coumadin?   No     Depression / Suicide Risk    As you are discharged from this [facility] facility, it is important to learn how to keep safe from harming yourself.    Recognize the warning signs:  · Abrupt changes in personality, positive or negative- including increase in energy   · Giving away possessions  · Change in eating patterns- significant weight changes-  positive or negative  · Change in sleeping patterns- unable to sleep or sleeping all the time   · Unwillingness or inability to communicate  · Depression  · Unusual sadness, discouragement and loneliness  · Talk of wanting to die  · Neglect of personal appearance   · Rebelliousness- reckless behavior  · Withdrawal from people/activities they love  · Confusion- inability to concentrate     If you or a loved one observes any of these behaviors or has concerns about self-harm, here's what you can do:  · Talk about it- your feelings and reasons for harming yourself  · Remove any means that you might use to hurt yourself (examples: pills, rope, extension cords, firearm)  · Get professional help from the community  (Mental Health, Substance Abuse, psychological counseling)  · Do not be alone:Call your Safe Contact- someone whom you trust who will be there for you.  · Call your local CRISIS HOTLINE 073-4921 or 902-212-6878  · Call your local Children's Mobile Crisis Response Team [place] (919) 509-8584 or www.[facility]  · Call the toll free National Suicide Prevention Hotlines   · National Suicide Prevention Lifeline 308-241-EZGC (0301)  · [employer] Hope Line Network 800-SUICIDE (076-0718)      Cerebral Aneurysm    A cerebral aneurysm is a bulge that occurs in the blood vessel inside the brain. An aneurysm is caused when a weakened part of the blood vessel expands. The blood vessel expands due to the constant pressure from the flow of blood through the weakened blood vessel. As the weakened aneurysm expands, the walls of the aneurysm become weaker.  Aneurysms are dangerous because they can leak or burst (rupture). When a cerebral aneurysm ruptures, it causes bleeding in the brain (subarachnoid hemorrhage). The blood flow to the area of the brain supplied by the artery is also reduced. This can cause stroke, seizures, or coma.  A ruptured cerebral aneurysm is a medical emergency. This can cause permanent brain damage or death.  What are the causes?  The exact cause of this condition is not known.  What increases the risk?  This condition is more likely to develop in people who:  · Are older. The condition is most common in people between the ages of 50-60.  · Are female  · Have a family history of aneurysm in two or more direct relatives.  · Have certain conditions that are passed along from parent to child (inherited). They include:  ? Autosomal dominant polycystic kidney disease. This is a condition in which small, fluid-filled sacs (cysts) develop in the kidney.  ? Neurofibromatosis type 1. In this condition, flat spots develop under the skin (pigmentation) and tumors grow along nerves in the skin, brain, and  other parts of the body.  ? Devin-Danlos syndrome. This is a condition in which bad connective tissue causes loose or unstable joints and creates a very soft skin that bruises or tears easily.  · Smoke.  · Have high blood pressure (hypertension).  · Abuse alcohol.  What are the signs or symptoms?  The signs and symptoms of a cerebral aneurysm that has not leaked or ruptured can depend on its size and rate of growth. A small, unchanging aneurysm generally does not cause symptoms. A larger aneurysm that is steadily growing can increase pressure on the brain or nerves.   The increased pressure from a cerebral aneurysm that has not leaked or ruptured can cause:  · A headache.  · Vision problems.  · Numbness or weakness in an arm or leg.  · Memory problems.  · Problems speaking.  · Seizures.  If an aneurysm leaks or ruptures, it can cause a life-threatening condition, such as stroke. Symptoms may include:  · A sudden, severe headache with no known cause. The headache is often described as the worst headache ever experienced.  · Stiff neck.  · Nausea or vomiting, especially when combined with other symptoms, such as a headache.  · Sudden weakness or numbness of the face, arm, or leg, especially on one side of the body.  · Sudden trouble walking or difficulty moving the arms or legs.  · Double vision.  · Sudden trouble seeing in one or both eyes.  · Trouble speaking or understanding speech (aphasia).  · Trouble swallowing.  · Dizziness.  · Loss of balance or coordination.  · Intolerance to light.  · Sudden confusion or loss of consciousness.  How is this diagnosed?  This condition is diagnosed using certain tests, including:  · CT scan.  · Computed tomographic angiogram (CTA). This test uses a dye and a scanner to produce images of your blood vessels.  · Magnetic resonance angiogram (MRA). This test uses an MRI machine to produce images of your blood vessels.  · Digital subtraction angiogram (DSA). This test involves  placing a flexible, thin tube (catheter) into the artery in your thigh and guiding it up to the arteries in the brain. A dye is then injected into the area and X-rays are taken to create images of your blood vessels.  How is this treated?  Unruptured aneurysm  Treatment is complex when an aneurysm is found and it is not causing problems. Treatment is individualized, as each case is different. Many factors must be considered, such as the size and exact location of your aneurysm, your age, your overall health, and your preferences. Small aneurysms in certain locations of the brain have a very low chance of bleeding or rupturing. These small aneurysms may not be treated.   In some cases, however, treatment may be required. Treatment depends on the size and location of the aneurysm. They may include:  · Coiling. During this procedure, a catheter is inserted and advanced through a blood vessel. Once the catheter reaches the aneurysm, tiny coils are used to block blood flow into the aneurysm. This procedure is sometimes done at the same time as a DSA.  · Surgical clipping. During surgery, a clip is placed at the base of the aneurysm. The clip prevents blood from continuing to enter the aneurysm.  · Flow diversion. This procedure is used to divert blood flow around the aneurysm with a stent that is placed across the opening of an aneurysm.  Ruptured aneurysm  Immediate emergency surgery or coiling may be needed to help prevent damage to the brain and to reduce the risk of rebleeding. The timing of treatment is an important factor in preventing complications. Successful early treatment of a ruptured aneurysm within the first 3 days of a bleed helps to prevent rebleeding and blood vessel spasm. In some cases, there may be a reason to treat 10-14 days after a rupture. Many factors are considered when making this decision, and each case is handled individually.  Follow these instructions at home:  If your aneurysm is not  treated:  · Take over-the-counter and prescription medicines only as told by your health care provider.  · Follow a diet suggested by your health care provider. Certain dietary changes may be advised to address high blood pressure (hypertension), such as choosing foods that are low in salt (sodium), saturated fat, trans fat, and cholesterol.  · Stay physically active. It is recommended that you get at least 30 minutes of activity on most or all days.  · Do not use any products that contain nicotine or tobacco, such as cigarettes and e-cigarettes. If you need help quitting, ask your health care provider.  · Limit alcohol intake to no more than 1 drink a day for nonpregnant women and 2 drinks a day for men. One drink equals 12 oz of beer, 5 oz of wine, or 1½ oz of hard liquor.  · Do not use street drugs. If you need help quitting, ask your health care provider.  · Keep all follow-up visits as told by your health care provider. This is important. This includes any referrals, imaging studies, and laboratory tests. Proper follow-up may prevent an aneurysm rupture or a stroke.  Get help right away if:  · You have a sudden, severe headache with no known cause. This may include a stiff neck.  · You have sudden nausea or vomiting with a severe headache.  · You have a seizure.  · You have other symptoms of stroke. The acronym BEFAST is an easy way to remember the main warning signs of stroke.  ? B = Balance problems. Signs include dizziness, sudden trouble walking, or loss of balance.  ? E = Eye problems. This includes trouble seeing or a sudden change in vision.  ? F = Face changes. This includes sudden weakness or numbness of the face, or the face or eyelid drooping to one side.  ? A = Arm weakness or numbness. This happens suddenly and usually on one side of the body.  ? S = Speech problems. This includes trouble speaking or trouble understanding.  ? T = Time. Time to call 911 or seek emergency care. Do not wait to see if  symptoms will go away. Make note of the time your symptoms started.  These symptoms may represent a serious problem that is an emergency. Do not wait to see if the symptoms will go away. Get medical help right away. Call your local emergency services (911 in the [place] Do not drive yourself to the hospital.  Summary  · An aneurysm is a bulge in an artery.  · Aneurysms are dangerous because they can leak or burst (rupture). When a cerebral aneurysm ruptures, it causes bleeding in the brain.  · Treatment depends on whether the aneurysm is ruptured. A ruptured aneurysm is a medical emergency.  · Get help right away if you have symptoms of stroke. The acronym BEFAST is an easy way to remember the main warning signs of stroke.  This information is not intended to replace advice given to you by your health care provider. Make sure you discuss any questions you have with your health care provider.  Document Released: 09/09/2003 Document Revised: 09/06/2019 Document Reviewed: 01/25/2018  [email or web address] Patient Education © 2020 [email or web address]      Stroke Prevention  Some medical conditions and behaviors are associated with a higher chance of having a stroke. You can help prevent a stroke by making nutrition, lifestyle, and other changes, including managing any medical conditions you may have.  What nutrition changes can be made?    · Eat healthy foods. You can do this by:  ? Choosing foods high in fiber, such as fresh fruits and vegetables and whole grains.  ? Eating at least 5 or more servings of fruits and vegetables a day. Try to fill half of your plate at each meal with fruits and vegetables.  ? Choosing lean protein foods, such as lean cuts of meat, poultry without skin, fish, tofu, beans, and nuts.  ? Eating low-fat dairy products.  ? Avoiding foods that are high in salt (sodium). This can help lower blood pressure.  ? Avoiding foods that have saturated fat, trans fat, and cholesterol. This can help prevent high  cholesterol.  ? Avoiding processed and premade foods.  · Follow your health care provider's specific guidelines for losing weight, controlling high blood pressure (hypertension), lowering high cholesterol, and managing diabetes. These may include:  ? Reducing your daily calorie intake.  ? Limiting your daily sodium intake to 1,500 milligrams (mg).  ? Using only healthy fats for cooking, such as olive oil, canola oil, or sunflower oil.  ? Counting your daily carbohydrate intake.  What lifestyle changes can be made?  · Maintain a healthy weight. Talk to your health care provider about your ideal weight.  · Get at least 30 minutes of moderate physical activity at least 5 days a week. Moderate activity includes brisk walking, biking, and swimming.  · Do not use any products that contain nicotine or tobacco, such as cigarettes and e-cigarettes. If you need help quitting, ask your health care provider. It may also be helpful to avoid exposure to secondhand smoke.  · Limit alcohol intake to no more than 1 drink a day for nonpregnant women and 2 drinks a day for men. One drink equals 12 oz of beer, 5 oz of wine, or 1½ oz of hard liquor.  · Stop any illegal drug use.  · Avoid taking birth control pills. Talk to your health care provider about the risks of taking birth control pills if:  ? You are over 35 years old.  ? You smoke.  ? You get migraines.  ? You have ever had a blood clot.  What other changes can be made?  · Manage your cholesterol levels.  ? Eating a healthy diet is important for preventing high cholesterol. If cholesterol cannot be managed through diet alone, you may also need to take medicines.  ? Take any prescribed medicines to control your cholesterol as told by your health care provider.  · Manage your diabetes.  ? Eating a healthy diet and exercising regularly are important parts of managing your blood sugar. If your blood sugar cannot be managed through diet and exercise, you may need to take  medicines.  ? Take any prescribed medicines to control your diabetes as told by your health care provider.  · Control your hypertension.  ? To reduce your risk of stroke, try to keep your blood pressure below 130/80.  ? Eating a healthy diet and exercising regularly are an important part of controlling your blood pressure. If your blood pressure cannot be managed through diet and exercise, you may need to take medicines.  ? Take any prescribed medicines to control hypertension as told by your health care provider.  ? Ask your health care provider if you should monitor your blood pressure at home.  ? Have your blood pressure checked every year, even if your blood pressure is normal. Blood pressure increases with age and some medical conditions.  · Get evaluated for sleep disorders (sleep apnea). Talk to your health care provider about getting a sleep evaluation if you snore a lot or have excessive sleepiness.  · Take over-the-counter and prescription medicines only as told by your health care provider. Aspirin or blood thinners (antiplatelets or anticoagulants) may be recommended to reduce your risk of forming blood clots that can lead to stroke.  · Make sure that any other medical conditions you have, such as atrial fibrillation or atherosclerosis, are managed.  What are the warning signs of a stroke?  The warning signs of a stroke can be easily remembered as BEFAST.  · B is for balance. Signs include:  ? Dizziness.  ? Loss of balance or coordination.  ? Sudden trouble walking.  · E is for eyes. Signs include:  ? A sudden change in vision.  ? Trouble seeing.  · F is for face. Signs include:  ? Sudden weakness or numbness of the face.  ? The face or eyelid drooping to one side.  · A is for arms. Signs include:  ? Sudden weakness or numbness of the arm, usually on one side of the body.  · S is for speech. Signs include:  ? Trouble speaking (aphasia).  ? Trouble understanding.  · T is for time.  ? These symptoms may  represent a serious problem that is an emergency. Do not wait to see if the symptoms will go away. Get medical help right away. Call your local emergency services (911 in the [place] Do not drive yourself to the hospital.  · Other signs of stroke may include:  ? A sudden, severe headache with no known cause.  ? Nausea or vomiting.  ? Seizure.  Where to find more information  For more information, visit:  · American Stroke Association: www.strokeassociation.org  · National Stroke Association: www.stroke.org  Summary  · You can prevent a stroke by eating healthy, exercising, not smoking, limiting alcohol intake, and managing any medical conditions you may have.  · Do not use any products that contain nicotine or tobacco, such as cigarettes and e-cigarettes. If you need help quitting, ask your health care provider. It may also be helpful to avoid exposure to secondhand smoke.  · Remember BEFAST for warning signs of stroke. Get help right away if you or a loved one has any of these signs.  This information is not intended to replace advice given to you by your health care provider. Make sure you discuss any questions you have with your health care provider.  Document Released: 01/25/2006 Document Revised: 11/30/2018 Document Reviewed: 01/23/2018  [employer] Patient Education © 2020 [employer]

## 2020-08-03 NOTE — CARE PLAN
Problem: Pain Management  Goal: Pain level will decrease to patient's comfort goal  Outcome: PROGRESSING AS EXPECTED     Problem: Psychosocial Needs:  Goal: Level of anxiety will decrease  Outcome: PROGRESSING AS EXPECTED     Problem: Communication  Goal: The ability to communicate needs accurately and effectively will improve  Outcome: PROGRESSING AS EXPECTED     Problem: Safety  Goal: Will remain free from injury  Outcome: PROGRESSING AS EXPECTED     Problem: Venous Thromboembolism (VTW)/Deep Vein Thrombosis (DVT) Prevention:  Goal: Patient will participate in Venous Thrombosis (VTE)/Deep Vein Thrombosis (DVT)Prevention Measures  Outcome: PROGRESSING AS EXPECTED     Problem: Bowel/Gastric:  Goal: Normal bowel function is maintained or improved  Outcome: PROGRESSING AS EXPECTED     Problem: Knowledge Deficit  Goal: Knowledge of disease process/condition, treatment plan, diagnostic tests, and medications will improve  Outcome: PROGRESSING AS EXPECTED

## 2020-08-03 NOTE — DISCHARGE SUMMARY
Discharge Summary    CHIEF COMPLAINT ON ADMISSION  Chief Complaint   Patient presents with   • Headache   • Blurred Vision       Reason for Admission  EMS     Admission Date  7/20/2020    CODE STATUS  Prior    HPI & HOSPITAL COURSE  For full details of admission please see the admission consult of Dr. Marie dated 7/20/2020, briefly, patient is a 40-year-old woman with a history of chronic hypertension, chronic pain, anxiety, history of CVA without residual deficit who presented with acute onset headache, generalized weakness, visual disturbances.  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  Interventional radiology was consulted and plan for cerebral angiogram and attempted intervention was made.  This was successfully carried out without major complication.  Palladium coil was placed.  Patient was subsequently seen by neurosurgery who recommended daily TCD's, gabapentin, valproic acid, status post course of Decadron.  Patient concluded a course of TCD's over the weekend of August 2.  Unfortunately she continued to have multiple somatic complaints likely related to her chronic pain and chronic anxiety.  Case was again reviewed with neurosurgery, head CT repeated and unremarkable, head MR limited due to clip was unremarkable in the visualized portions.  She was cleared for outpatient follow-up of her ongoing issues.       Therefore, she is discharged in good and stable condition to home with close outpatient follow-up.    The patient met 2-midnight criteria for an inpatient stay at the time of discharge.    Discharge Date  8/3/2020    FOLLOW UP ITEMS POST DISCHARGE  Follow-up with Dr. Reeves as indicated.    DISCHARGE DIAGNOSES  Principal Problem (Resolved):    SAH (subarachnoid hemorrhage) (HCC) POA: Yes  Active Problems:    Pain, chronic POA: Yes    Aneurysm (HCC) POA: Unknown  Resolved Problems:    HTN  (hypertension) POA: Yes    Headache POA: Yes    Hyperkalemia POA: No    Noncompliance POA: Yes    Hyponatremia POA: Yes    RODO (acute kidney injury) (HCC) POA: Yes    Low TSH level POA: Yes    Positive urine drug screen POA: Yes    Muscle spasm POA: Unknown      FOLLOW UP  No future appointments.  No follow-up provider specified.    MEDICATIONS ON DISCHARGE     Medication List      START taking these medications      Instructions   ALPRAZolam 0.5 MG Tabs  Commonly known as:  XANAX   Take 1 Tab by mouth 3 times a day as needed for Anxiety for up to 24 doses.  Dose:  0.5 mg     cyclobenzaprine 10 MG Tabs  Commonly known as:  FLEXERIL   Take 1 Tab by mouth 3 times a day as needed for Muscle Spasms.  Dose:  10 mg     gabapentin 300 MG Caps  Commonly known as:  NEURONTIN   Take 1 Cap by mouth 3 times a day.  Dose:  300 mg     hydrALAZINE 25 MG Tabs  Commonly known as:  APRESOLINE   Take 1 Tab by mouth every 8 hours.  Dose:  25 mg     niMODipine 30 MG Caps  Commonly known as:  NIMOTOP   Take 2 Caps by mouth every 4 hours.  Dose:  60 mg     oxyCODONE immediate-release 5 MG Tabs  Commonly known as:  ROXICODONE   Take 1 Tab by mouth every four hours as needed for Severe Pain for up to 12 days.  Dose:  5 mg     oxyCODONE-acetaminophen  MG Tabs  Commonly known as:  PERCOCET-10   Take 1 Tab by mouth every four hours as needed for up to 24 doses.  Dose:  1 Tab     sodium chloride 1 GM Tabs  Commonly known as:  SALT   Take 1 Tab by mouth 3 times a day, with meals.  Dose:  1 g        CONTINUE taking these medications      Instructions   amLODIPine 10 MG Tabs  Commonly known as:  NORVASC   Take 1 Tab by mouth every day.  Dose:  10 mg     citalopram 40 MG Tabs  Commonly known as:  CELEXA   Take 1 Tab by mouth every day.  Dose:  40 mg     hydroCHLOROthiazide 25 MG Tabs  Commonly known as:  HYDRODIURIL   Take 1 Tab by mouth every day for 30 days.  Dose:  25 mg     lisinopril 40 MG tablet  Commonly known as:  PRINIVIL   Take 1  Tab by mouth every day.  Dose:  40 mg     metoprolol SR 25 MG Tb24  Commonly known as:  TOPROL XL   Take 1 Tab by mouth every day.  Dose:  25 mg            Allergies  Allergies   Allergen Reactions   • Motrin [Ibuprofen] Hives   • Pcn [Penicillins]    • Toradol Anaphylaxis       DIET  No orders of the defined types were placed in this encounter.      ACTIVITY  As tolerated.  Weight bearing as tolerated    CONSULTATIONS  Neuro interventional radiology  Pulmonary/critical care medicine  Neurosurgery  Hospital medicine    PROCEDURES  Embolization of a ruptured PCOM aneurysm.    LABORATORY  Lab Results   Component Value Date    SODIUM 136 08/03/2020    POTASSIUM 4.5 08/03/2020    CHLORIDE 96 08/03/2020    CO2 28 08/03/2020    GLUCOSE 85 08/03/2020    BUN 23 (H) 08/03/2020    CREATININE 1.22 08/03/2020        Lab Results   Component Value Date    WBC 8.9 07/28/2020    HEMOGLOBIN 13.5 07/28/2020    HEMATOCRIT 42.3 07/28/2020    PLATELETCT 224 07/28/2020        Total time of the discharge process exceeds 36 minutes.